# Patient Record
Sex: FEMALE | Race: WHITE | NOT HISPANIC OR LATINO
[De-identification: names, ages, dates, MRNs, and addresses within clinical notes are randomized per-mention and may not be internally consistent; named-entity substitution may affect disease eponyms.]

---

## 2018-12-05 ENCOUNTER — RESULT REVIEW (OUTPATIENT)
Age: 78
End: 2018-12-05

## 2018-12-18 ENCOUNTER — RESULT REVIEW (OUTPATIENT)
Age: 78
End: 2018-12-18

## 2019-01-18 ENCOUNTER — RESULT REVIEW (OUTPATIENT)
Age: 79
End: 2019-01-18

## 2019-01-28 ENCOUNTER — RECORD ABSTRACTING (OUTPATIENT)
Age: 79
End: 2019-01-28

## 2019-01-28 DIAGNOSIS — Z86.39 PERSONAL HISTORY OF OTHER ENDOCRINE, NUTRITIONAL AND METABOLIC DISEASE: ICD-10-CM

## 2019-01-28 DIAGNOSIS — Z78.9 OTHER SPECIFIED HEALTH STATUS: ICD-10-CM

## 2019-01-28 DIAGNOSIS — N95.2 POSTMENOPAUSAL ATROPHIC VAGINITIS: ICD-10-CM

## 2019-01-28 DIAGNOSIS — Z87.891 PERSONAL HISTORY OF NICOTINE DEPENDENCE: ICD-10-CM

## 2019-01-28 LAB — CYTOLOGY CVX/VAG DOC THIN PREP: NORMAL

## 2019-01-31 ENCOUNTER — APPOINTMENT (OUTPATIENT)
Dept: ENDOCRINOLOGY | Facility: CLINIC | Age: 79
End: 2019-01-31
Payer: MEDICARE

## 2019-01-31 ENCOUNTER — RESULT REVIEW (OUTPATIENT)
Age: 79
End: 2019-01-31

## 2019-01-31 VITALS
DIASTOLIC BLOOD PRESSURE: 72 MMHG | BODY MASS INDEX: 28.32 KG/M2 | WEIGHT: 150 LBS | SYSTOLIC BLOOD PRESSURE: 118 MMHG | HEIGHT: 61 IN

## 2019-01-31 PROCEDURE — 99213 OFFICE O/P EST LOW 20 MIN: CPT

## 2019-01-31 RX ORDER — RIVAROXABAN 20 MG/1
20 TABLET, FILM COATED ORAL
Refills: 0 | Status: DISCONTINUED | COMMUNITY
End: 2019-01-31

## 2019-04-13 NOTE — HISTORY OF PRESENT ILLNESS
[FreeTextEntry1] : January 31, 2019\par \par PCP: Dr. Supriya Segovia/Dr. Wojciech Lee\par             Gyn: Dr. Caity Reynolds\par             Urol: Dr. Uriel Alex (stones - one episode) \par             Oral surgeon: Dr. Nugent - dental implants\par            .\par            CC: Osteoporosis (prev. Forteo): Fosamax Jan 2014\par             Last BD: 12/12/2016\par             Asymptomatic kidney stones **\par             Hypercalciuria **\par             (Lyme )\par             History of partial thyroid lobectomy;\par             Thyroid nodules (sono 12/12/2016)\par             -FNAB 6/7/17 [facility] of 2.3 cm L nodule: neg \par             History of I-131 for hyperthyroidism\par             new: DVT LLE 6/2017.\par            " IMPRESSION: No significant change in the appearance of the thyroid since 12/12/2016, including a \par            previously biopsied left lower pole nodule, and an hypoechoic nodule at the posterior aspect of the \par            left upper pole that could be either exophytic or extrathyroidal (parathyroid). In view of the \par            history of hyperparathyroidism, follow-up radionuclide parathyroid scintigraphy can be performed \par            for further more specific evaluation in this regard. \par            ***Electronically Signed *** \par            ----------------------------------------------- \par            Donovan Hollis MD 05/15/18 \par \par \par Recent bone density shows spine T -3.1 and trending down.\par Ultrasound thyroid, no significant change since 12/12/2016 including previously biopsied nodule.  \par Sestamibi parathyroid scan : negative  \par Recent [facility] ionized calcium 5.7 (4.9 - 5.6) **\par PTH 73 (14-64) \par CTXs  653\par osteocalcin 24\par phos 3.3\par TSH 2.88\par vit D 28\par \par No compression fx in 2014.\par \par Imp:  Normocalcemic hyperparathyroid.\par Would benefit from antiresorptive.\par Consider Evenity when available.\par ROV 6 months\par \par \par Previous notes from [employer] appended below.\par \par  June 22, 2018\par            .\par            PCP: Dr. Supriya Segovia/Dr. Wojciech Lee\par             Gyn: Dr. Caity Reynolds\par             Urol: Dr. Uriel Alex (stones - one episode) \par             Oral surgeon: Dr. Nugent - dental implants\par            .\par            CC: Osteoporosis (prev. Forteo): Fosamax Jan 2014\par             Last BD: 12/12/2016\par             Asymptomatic kidney stones **\par             Hypercalciuria **\par             (Lyme )\par             History of partial thyroid lobectomy;\par             Thyroid nodules (sono 12/12/2016)\par             -FNAB 6/7/17 [facility] of 2.3 cm L nodule: neg \par             History of I-131 for hyperthyroidism\par             new: DVT LLE 6/2017.\par            " IMPRESSION: No significant change in the appearance of the thyroid since 12/12/2016, including a \par            previously biopsied left lower pole nodule, and an hypoechoic nodule at the posterior aspect of the \par            left upper pole that could be either exophytic or extrathyroidal (parathyroid). In view of the \par            history of hyperparathyroidism, follow-up radionuclide parathyroid scintigraphy can be performed \par            for further more specific evaluation in this regard. \par            ***Electronically Signed *** \par            ----------------------------------------------- \par            Donovan Hollis MD 05/15/18 \par            .\par            .\par            Impression: Based on above, I will ask her to stop by [facility] for sestamibi parathroid scan and to return after that.\par            can then decide on when and if to start another round of treatment for the osteoporosis. \par            .\par            ==\par            .\par            Feb 2, 2018\par            .\par            PCP: Dr. Supriya Segovia/Dr. Wojciech Lee\par             Gyn: Dr. Caity Reynolds\par             Urol: Dr. Uriel Alex (stones - one episode) \par             Oral surgeon: Dr. Raffi - dental implants\par            .\par            CC: Osteoporosis (prev. Forteo): Fosamax Jan 2014\par             Last BD: 12/12/2016\par             Asymptomatic kidney stones **\par             Hypercalciuria **\par             (Lyme )\par             History of partial thyroid lobectomy;\par             Thyroid nodules (sono 12/12/2016)\par             -FNAB 6/7/17 [facility] of 2.3 cm L nodule: neg \par             History of I-131 for hyperthyroidism\par             new: DVT LLE 6/2017.\par            .\par            Stopped a/c two weeks ago.\par            Stopped Fosamax end of Dec 2017 after 4 years. \par            . \par            Labs ([facility]) from \par            1/23/2018\par            TSH 2.66\par            calcium 10.3\par            ionized calcium 5.9 (4.8 - 5.6) ***\par            phos 3.4\par            PTH 64 (14-64) ***\par            25 OH vit D\par            CTXs 383 ****\par            .\par            Impression: Took two years of Forteo, 4 years alendronate, started "holiday" recently, has dental imiplants, eligible for BD Dec 2018 and will likely benefit from:\par            -sestamibi parathyroid scan\par            -Prolia after next BD\par             - need fu u/s thyroid around April and RDV May. \par            .\par            ==\par            .\par            Sept 15, 2017\par            .\par            PCP: Dr. Supriya Segovia\par             Gyn: Dr. Caity Reynolds\par             Urol: Dr. Uriel Alex (stones - one episode) \par            .\par            CC: Osteoporosis (prev. Forteo): Fosamax Jan 2014\par             Last BD: 12/12/2016\par             Asymptomatic kidney stones **\par             Hypercalciuria **\par             (Lyme )\par             History of partial thyroid lobectomy;\par             Thyroid nodules (sono 12/12/2016)\par             History of I-131 for hyperthyroidism\par             new: DVT LLE 6/2017.\par            .\par            Went for FNAB thyroid nodule at [facility] - cytology read as benign\par            .\par            Bone density stable and December 2017 she will have been on Fosamax for 4 years, so will advise "holiday".\par            .\par            Now on Xaralto because of L DVT:\par            .\par            .\par            "6/23/2017\par            IMPRESSION: Positive DVT study with thrombus extending from the posterior tibial vein into the popliteal vein."\par            .\par            My impression: \par            Labs at [facility] on \par            9/11/2017 included \par            calcium 10.2 (8.6-10.4)\par            phos 3.2\par            PTH 73 (14-64)\par            TSH 2.55 \par            25 OH vit D 28\par            CTXs 282 (on alendronate) \par            .\par            Impression: \par            FNAB thyroid at [facility] (after first at [place] [facility]") reassuring.\par            .\par             Normocalcemic hyperparathyroidism.\par            Bone density stable.\par            Can go on "holiday" from alendronate.\par            Monitor calcium/PTH\par            ROV February. \par            Next BD ~Dec 2019\par            .\par            ==\par            .\par            May 5, 2017\par            .\par            PCP: Dr. Supriya Segovia\par             Gyn: Dr. Caity Reynolds\par             Urol: Dr. Uriel Alex (stones - one episode) \par            .\par            CC: Osteoporosis (prev. Forteo): Fosamax Jan 2014\par             Last BD: 12/12/2016\par             Asymptomatic kidney stones **\par             Hypercalciuria **\par             (Lyme )\par             History of partial thyroid lobectomy;\par             Thyroid nodules (sono 12/12/2016)\par             History of I-131 for hyperthyroidism\par             .\par            .\par            Imp/Plan:\par            .\par            Biopsy of dominant thyroid nodule at [place] was not successful.\par            Went to [facility] for sonogram and Dr. Phipps also advised FNAB so will ask her to try there. \par            .\par            Bone density seems to have improved on Fosamax. End of this year will be four years and a reasonable time to consider a "holiday" if she and the rest of her team agree.\par            .\par            Monitor vit D and PTH level.\par            ROV in September. \par            .\par            .\par            .\par            ==\par            .\par            March 3, 2017\par            .\par            PCP: Dr. Supriya Segovia\par             Gyn: Dr. Caity Reynolds\par            .\par            CC: Osteoporosis (prev. Forteo): Fosamax Jan 2014\par             Last BD: 12/12/2016\par             Asymptomatic kidney stones \par             Hypercalciuria \par             (Lyme )\par             History of partial thyroid lobectomy;\par             Thyroid nodules (sono 12/12/2016)\par             History of I-131 for hyperthyroidism\par            .\par            Visited [facility] and enjoyed it. \par            .\par            Labs ([facility])\par            24 hour urine collection \par            1 g creatinine \par            calcium 287 (<250)\par            .\par            creatinine 0.81\par            uric acid 4.2\par            serum calcium 10.4 (8.6-10.4) \par            PTH 60 \par            25 OH vit D 27 \par            CTXs 278\par            .\par            Bone density stable\par            Teeth stable\par            Thyroid sono advises FNAB of nodule (she is s/p partial lobectomy in [facility] and I-131 for hyperthyroidism several years ago)\par            .\par            Impression: Because of evidence of possible normocalcemic hyperparathyroidism, she went for ultrasound of thyroid/parathyroid and nodules detected with recommendation for FNAB \par            .\par            Osteoporosis currently stable. \par            .\par            Plan: ROV in two months (after FNAB of thyroid nodule)\par            .\par            ==\par            .;\par            October 7, 2016\par            .\par            PCP: Dr. Supriya Segovia\par             Gyn: Dr. Caity Reynolds\par            .\par            CC: Osteoporosis (prev. Forteo): Fosamax Jan 2014\par             (Lyme last \par            .\par            Dental issues are quiet\par            Remains on alendronate weekly.\par            .\par            Blood tests ([facility]) from \par            3/18/2016 inclued\par            BS 87 mg/dl\par            calcium 9.1\par            phos 3.1\par            TSH 2.28 \par            Vit B12 367\par            PTH 78 (14-65) \par            creat 0.79\par            vit D 35\par            ionized calcium - normal \par            .\par            Did not yet go for BD.\par            For problem with urination saw Uriel at [facility]. \par            sono showed bilateral tiny kidney stones. \par            stopped calcium supp after that. \par            (mother had stones)\par            .\par            Plan: sono thyroid\par            repeat PTH\par            24 hour urine ca/stone former\par            bone density\par            ROV Feb\par            continue Fosamax\par            .\par            ==\par            .\par            March 18, 2016\par            .\par            PCP: Dr. Supriya Segovia\par             Gyn: Dr. Caity Reynolds\par            .\par            CC: Osteoporosis (prev. Forteo): Fosamax Jan 2014\par             (Lyme last \par            .\par            Had bone grafting lower part of her mouth - last September.\par            .\par            Plan: Updated labs now.\par            ROV late October after BD\par            .\par            ==\par            .\par            June 5, 2015\par            .\par            PCP: Dr. Supriya Segovia\par             Gyn: Dr. Caity Reynolds\par            .\par            CC: Osteoporosis (prev. Forteo): Fosamax Jan 2014\par             (Lyme last \par            .\par            [facility]\par            .\par            Recent [facility] labs show 25 OH vit 41\par            CTXs 148\par            ionized calcium 5.7 (4.8-5.6) \par            .\par            Impression: Took Forteo for two years and has been on Fosamax since Jan 2014.\par            .\par            Impression: Dental issues are quiet. Serial bone densities show considerable improvement over time, especially in her spine. Last BD Sept 2014 showed spine T -2.6 Z -0.3.\par            .\par            Plan: Continue Fosamax for 3-5 years.\par            .Next BD ~ Oct 2016.\par            ROV here in March.\par            .\par            .\par            ===\par            October 28, 2014\par            PCP: Dr. Supriya Segovia\par            CC: Osteoporosis (prev. Forteo)\par            .\par            Treated for Lyme over the summer.\par            On Fosamax since Jan 2014.\par            April 2014 CTXs 192 (10/2013 CTXs 341).\par             vit D 36\par            .\par            Much of improvement in bone density was likely from the Forteo and she is now holding her own with Fosamax. \par            .\par            Dental implant fine.\par            .\par            Plan: Continue the Fosamax for up to 3 years and then consider Prolia.\par            ROV six months. \par            .\par            ===\par            April 8, 2014\par            PCP: Dr. Supriya Segovia\par            CC: Osteoporosois\par            .\par            Took Forteo for two years.\par            Because of dental issues preferred to take Calcitonin after that.\par            Switched to Fosamax around Januiary 2014.\par            Notes no symptoms from Fosamax.\par            Her densist, Dr. Nugent, is pleased with her progress.\par            She goes for teeth cleaning every 3 months.\par            .\par            Impression: Markers of bone turnover have decreased on Fosamax, predicting absorption and effectiveness.\par            .\par            Plan: Same Rx. Follow up BD two years after last. \par            .\par            .\par            ====\par            October 28, 2013\par            PCP: Dr. Supriya Segovia\par            CC: Osteoporosis (occasion hypercalcemia); previous Forteo\par            .\par            Blood tests at [facility] in July showed calcium 10.3, PTH 47\par            CTXs 342. \par            BSA 18.1 (14.2 - 42.7)\par            .\par            Labs from today still pending.\par            Dr. Nugent said: "Dr. Hellerman can do whatever he wants now."\par            Has a 3 month supply of calcitonin. \par            Plan: After finish\par            .\par            ========\par            March 26, 2013 Returns for osteoporosis. Has not yet received approval from Dr. Nugent to have more aggressive treatment than calcitonin (such as Fosamax, Reclast, or Prolia). She will be seeing Dr. Nugent within 3 months and will get his approval. In the meatime, she will stay on the calcitonin nasal spray. \par            .\par            Had blood tests last week at [facility] in [place]. \par  \par  ROS:  \par  \par

## 2019-04-13 NOTE — PHYSICAL EXAM
[Alert] : alert [No Acute Distress] : no acute distress [Well Nourished] : well nourished [Well Developed] : well developed [Normal Sclera/Conjunctiva] : normal sclera/conjunctiva [EOMI] : extra ocular movement intact [No Proptosis] : no proptosis [Normal Oropharynx] : the oropharynx was normal [Thyroid Not Enlarged] : the thyroid was not enlarged [No Thyroid Nodules] : there were no palpable thyroid nodules [No Respiratory Distress] : no respiratory distress [No Accessory Muscle Use] : no accessory muscle use [Clear to Auscultation] : lungs were clear to auscultation bilaterally [Normal Rate] : heart rate was normal  [Normal S1, S2] : normal S1 and S2 [Regular Rhythm] : with a regular rhythm [Pedal Pulses Normal] : the pedal pulses are present [No Edema] : there was no peripheral edema [Normal Bowel Sounds] : normal bowel sounds [Not Tender] : non-tender [Soft] : abdomen soft [Not Distended] : not distended [Post Cervical Nodes] : posterior cervical nodes [Anterior Cervical Nodes] : anterior cervical nodes [Axillary Nodes] : axillary nodes [Normal] : normal and non tender [No Spinal Tenderness] : no spinal tenderness [Spine Straight] : spine straight [No Stigmata of Cushings Syndrome] : no stigmata of cushings syndrome [Normal Gait] : normal gait [Normal Strength/Tone] : muscle strength and tone were normal [No Rash] : no rash [No Tremors] : no tremors [Normal Reflexes] : deep tendon reflexes were 2+ and symmetric [Oriented x3] : oriented to person, place, and time [Acanthosis Nigricans] : no acanthosis nigricans

## 2019-06-27 ENCOUNTER — APPOINTMENT (OUTPATIENT)
Dept: ENDOCRINOLOGY | Facility: CLINIC | Age: 79
End: 2019-06-27
Payer: MEDICARE

## 2019-06-27 VITALS
SYSTOLIC BLOOD PRESSURE: 110 MMHG | HEART RATE: 75 BPM | BODY MASS INDEX: 29.07 KG/M2 | WEIGHT: 154 LBS | DIASTOLIC BLOOD PRESSURE: 68 MMHG | HEIGHT: 61 IN

## 2019-06-27 PROCEDURE — 99213 OFFICE O/P EST LOW 20 MIN: CPT

## 2019-06-27 RX ORDER — CHROMIUM 200 MCG
1000 TABLET ORAL DAILY
Refills: 0 | Status: DISCONTINUED | COMMUNITY
End: 2019-06-27

## 2019-06-27 NOTE — ASSESSMENT
[FreeTextEntry1] : Has dental issues, seem to be quieting down.\par On "holiday" from alendronate.\par Will likely aim for Prolia for next tretament.

## 2019-11-12 ENCOUNTER — APPOINTMENT (OUTPATIENT)
Dept: ENDOCRINOLOGY | Facility: CLINIC | Age: 79
End: 2019-11-12
Payer: MEDICARE

## 2019-11-12 VITALS
HEIGHT: 61 IN | DIASTOLIC BLOOD PRESSURE: 70 MMHG | BODY MASS INDEX: 28.32 KG/M2 | HEART RATE: 56 BPM | SYSTOLIC BLOOD PRESSURE: 110 MMHG | WEIGHT: 150 LBS

## 2019-11-12 DIAGNOSIS — Z87.39 PERSONAL HISTORY OF OTHER DISEASES OF THE MUSCULOSKELETAL SYSTEM AND CONNECTIVE TISSUE: ICD-10-CM

## 2019-11-12 DIAGNOSIS — E55.9 VITAMIN D DEFICIENCY, UNSPECIFIED: ICD-10-CM

## 2019-11-12 PROCEDURE — 99214 OFFICE O/P EST MOD 30 MIN: CPT

## 2019-11-15 PROBLEM — E55.9 VITAMIN D DEFICIENCY: Status: ACTIVE | Noted: 2019-01-28

## 2019-11-15 PROBLEM — Z87.39 HISTORY OF OSTEOPOROSIS: Status: RESOLVED | Noted: 2019-01-28 | Resolved: 2019-11-15

## 2019-11-15 NOTE — PHYSICAL EXAM
[Alert] : alert [Well Nourished] : well nourished [No Acute Distress] : no acute distress [Well Developed] : well developed [Healthy Appearance] : healthy appearance [Normal Voice/Communication] : normal voice communication [EOMI] : extra ocular movement intact [No Proptosis] : no proptosis [Normal Oropharynx] : the oropharynx was normal [No Lid Lag] : no lid lag [No Respiratory Distress] : no respiratory distress [Thyroid Not Enlarged] : the thyroid was not enlarged [No Thyroid Nodules] : there were no palpable thyroid nodules [Normal Rate and Effort] : normal respiratory rhythm and effort [No Accessory Muscle Use] : no accessory muscle use [Normal Rate] : heart rate was normal  [Pedal Pulses Normal] : the pedal pulses are present [No Edema] : there was no peripheral edema [No Stigmata of Cushings Syndrome] : no stigmata of cushings syndrome [Normal Gait] : normal gait [Acanthosis Nigricans] : no acanthosis nigricans [No Rash] : no rash [Oriented x3] : oriented to person, place, and time [No Tremors] : no tremors [Normal Insight/Judgement] : insight and judgment were intact [Normal Affect] : the affect was normal [Normal Mood] : the mood was normal

## 2019-11-15 NOTE — HISTORY OF PRESENT ILLNESS
[FreeTextEntry1] : Nov 12, 2019\par \par PCP:     Dr. ZEB Saleh (to see) ******\par             Gyn: Dr. Caity Reynolds\par             Urol: Dr. Uriel Alex (stones - one episode) \par             Oral surgeon: Dr. Nugent - dental implants\par            .\par            CC: Osteoporosis (prev. Forteo): Fosamax Jan 2014 2/23/2018  CTX s 383  ionized Ca  5.9 (<5.6) [facility]\par                               1/31/2019:  no compression fractures.  Low vit D  \par                                6/27/19 [facility] Spine T -3.0, TH -1.4, FN -2.2, forearm -2.5\par                               11/8/19 hypercalcemia  Ca 10.5 (8.6 - 10.4) [facility]  CTXs 429  Vit D 30 (no PTH)\par                   Thyroid nodule  2.3 cm L, FNAB 6/2017 [facility] - benign- low cellularity\par                   Kidney stones  10/2015 US Kidney - asymptomatic \par \par \par #  Will ask her to have f/u US thyroid within next year.\par \par #  Osteoporos of spine \par         Has been on "holiday" from alendronate.  \par \par         Impression:  Previously diagnosed with normocalcemic hyperparathryoidism; however, most recent\par           [facility] calcium slightly elevated at 10.5, normal up to 10.4, with normal renal function and\par          25 OH vitmin D level of 30  (but no PTH)\par \par           X-ray spine neg:\par               Lumbosacral Spine.\par \par Frontal, lateral and cone-down projections demonstrate satisfactory alignment of the bony structures. No fracture or vertebral compression can be appreciated. There is no evidence of pedicle destruction. Intervertebral disc spaces are satisfactorily maintained. The sacroiliac joints are symmetric. As compared to the patient's previous study of 9/29/2014, there has been no significant change of an adverse nature.\par \par \par IMPRESSION: No evidence of lumbar compression deformity. No significant change from 9/29/2014.\par \par \par ***Electronically Signed ***\par -----------------------------------------------\par Wojciech Biggs MD              01/31/19 1454\par \par \par IMPRESSION:\par \par No focal site of initial increased and persistent sestamibi tracer localization is evident to specifically suggest uptake in parathyroid adenoma or hyperplasia.\par \par The left thyroid lobe is asymmetrically much larger than the right with much more intense tracer localization.\par \par \par \par \par ***Electronically Signed ***\par -----------------------------------------------\par Donovan Hollis MD              12/06/18 \par \par Imp:  Sestamibi negative.      \par Plan:  24 hour Ca at [facility] with PTH, vit D before next visit in 6 months - will likely decide on\par Prolia at that time; however, will also reconsider evaluation of parathyroid.  \par \par \par \par \par June 27, 2019\par \par PCP:     Dr. ZEB Saleh (to see) ******\par             Gyn: Dr. Caity Reynolds\par             Urol: Dr. Uriel Alex (stones - one episode) \par             Oral surgeon: Dr. Nugent - dental implants\par            .\par            CC: Osteoporosis (prev. Forteo): Fosamax Jan 2014\par \par 77 yo on "holiday" from Fosamax.  \par Lab tests from yesterday at [facility] include \par TSH 1.81\par 25 OH vit D 24\par \par Lumbar spine, L1 to L2, total: 0.647 gm./cm.2.\par \par T-score: -3.0\par \par Z-score: -0.6\par \par Left hip: 0.767 gm./cm.2.\par \par T-score: -1.4 \par \par Z-score: 0.6\par \par Left femoral neck: 0.606\par \par T score: -2.2\par \par Z score: 0.1\par \par  Left forearm, distal third: 0.544\par \par T score: -2.5\par \par Z score: 0.5\par \par On holiday from alendronate.\par \par Last dental implant a few weeks ago.   After a bone graft !\par No other implants on the horizon.\par Plan:  Updated labs late Nov, ROV Dec.\par Likely Prolia next time around  \par \par January 31, 2019\par \par PCP: Dr. Supriya Segovia/Dr. Wojciech Lee\par             Gyn: Dr. Caity Reynolds\par             Urol: Dr. Uriel Alex (stones - one episode) \par             Oral surgeon: Dr. Nugent - dental implants\par            .\par            CC: Osteoporosis (prev. Forteo): Fosamax Jan 2014\par             Last BD: 12/12/2016\par             Asymptomatic kidney stones **\par             Hypercalciuria **\par             (Lyme )\par             History of partial thyroid lobectomy;\par             Thyroid nodules (sono 12/12/2016)\par             -FNAB 6/7/17 [facility] of 2.3 cm L nodule: neg \par             History of I-131 for hyperthyroidism\par             new: DVT LLE 6/2017.\par            " IMPRESSION: No significant change in the appearance of the thyroid since 12/12/2016, including a \par            previously biopsied left lower pole nodule, and an hypoechoic nodule at the posterior aspect of the \par            left upper pole that could be either exophytic or extrathyroidal (parathyroid). In view of the \par            history of hyperparathyroidism, follow-up radionuclide parathyroid scintigraphy can be performed \par            for further more specific evaluation in this regard. \par            ***Electronically Signed *** \par            ----------------------------------------------- \par            Donovan Hollis MD 05/15/18 \par \par \par Recent bone density shows spine T -3.1 and trending down.\par Ultrasound thyroid, no significant change since 12/12/2016 including previously biopsied nodule.  \par Sestamibi parathyroid scan : negative  \par Recent [facility] ionized calcium 5.7 (4.9 - 5.6) **\par PTH 73 (14-64) \par CTXs  653\par osteocalcin 24\par phos 3.3\par TSH 2.88\par vit D 28\par \par No compression fx in 2014.\par \par Imp:  Normocalcemic hyperparathyroid.\par Would benefit from antiresorptive.\par Consider Evenity when available.\par ROV 6 months\par \par \par Previous notes from [employer] appended below.\par \par  June 22, 2018\par            .\par            PCP: Dr. Supriya Segovia/Dr. Wojciech Lee\par             Gyn: Dr. Caity Reynolds\par             Urol: Dr. Uriel Alex (stones - one episode) \par             Oral surgeon: Dr. Nugent - dental implants\par            .\par            CC: Osteoporosis (prev. Forteo): Fosamax Jan 2014\par             Last BD: 12/12/2016\par             Asymptomatic kidney stones **\par             Hypercalciuria **\par             (Lyme )\par             History of partial thyroid lobectomy;\par             Thyroid nodules (sono 12/12/2016)\par             -FNAB 6/7/17 [facility] of 2.3 cm L nodule: neg \par             History of I-131 for hyperthyroidism\par             new: DVT LLE 6/2017.\par            " IMPRESSION: No significant change in the appearance of the thyroid since 12/12/2016, including a \par            previously biopsied left lower pole nodule, and an hypoechoic nodule at the posterior aspect of the \par            left upper pole that could be either exophytic or extrathyroidal (parathyroid). In view of the \par            history of hyperparathyroidism, follow-up radionuclide parathyroid scintigraphy can be performed \par            for further more specific evaluation in this regard. \par            ***Electronically Signed *** \par            ----------------------------------------------- \par            Donovan Hollis MD 05/15/18 \par            .\par            .\par            Impression: Based on above, I will ask her to stop by [facility] for sestamibi parathroid scan and to return after that.\par            can then decide on when and if to start another round of treatment for the osteoporosis. \par            .\par            ==\par            .\par            Feb 2, 2018\par            .\par            PCP: Dr. Supriya Segovia/Dr. Wojciech Lee\par             Gyn: Dr. Caity Reynolds\par             Urol: Dr. Uriel Alex (stones - one episode) \par             Oral surgeon: Dr. Nugent - dental implants\par            .\par            CC: Osteoporosis (prev. Forteo): Fosamax Jan 2014\par             Last BD: 12/12/2016\par             Asymptomatic kidney stones **\par             Hypercalciuria **\par             (Lyme )\par             History of partial thyroid lobectomy;\par             Thyroid nodules (sono 12/12/2016)\par             -FNAB 6/7/17 [facility] of 2.3 cm L nodule: neg \par             History of I-131 for hyperthyroidism\par             new: DVT LLE 6/2017.\par            .\par            Stopped a/c two weeks ago.\par            Stopped Fosamax end of Dec 2017 after 4 years. \par            . \par            Labs ([facility]) from \par            1/23/2018\par            TSH 2.66\par            calcium 10.3\par            ionized calcium 5.9 (4.8 - 5.6) ***\par            phos 3.4\par            PTH 64 (14-64) ***\par            25 OH vit D\par            CTXs 383 ****\par            .\par            Impression: Took two years of Forteo, 4 years alendronate, started "holiday" recently, has dental imiplants, eligible for BD Dec 2018 and will likely benefit from:\par            -sestamibi parathyroid scan\par            -Prolia after next BD\par             - need fu u/s thyroid around April and RDV May. \par            .\par            ==\par            .\par            Sept 15, 2017\par            .\par            PCP: Dr. Supriya Segovia\par             Gyn: Dr. Caity Reynolds\par             Urol: Dr. Uriel Alex (stones - one episode) \par            .\par            CC: Osteoporosis (prev. Forteo): Fosamax Jan 2014\par             Last BD: 12/12/2016\par             Asymptomatic kidney stones **\par             Hypercalciuria **\par             (Lyme )\par             History of partial thyroid lobectomy;\par             Thyroid nodules (sono 12/12/2016)\par             History of I-131 for hyperthyroidism\par             new: DVT LLE 6/2017.\par            .\par            Went for FNAB thyroid nodule at [facility] - cytology read as benign\par            .\par            Bone density stable and December 2017 she will have been on Fosamax for 4 years, so will advise "holiday".\par            .\par            Now on Xaralto because of L DVT:\par            .\par            .\par            "6/23/2017\par            IMPRESSION: Positive DVT study with thrombus extending from the posterior tibial vein into the popliteal vein."\par            .\par            My impression: \par            Labs at [facility] on \par            9/11/2017 included \par            calcium 10.2 (8.6-10.4)\par            phos 3.2\par            PTH 73 (14-64)\par            TSH 2.55 \par            25 OH vit D 28\par            CTXs 282 (on alendronate) \par            .\par            Impression: \par            FNAB thyroid at [facility] (after first at [place] "[facility]") reassuring.\par            .\par             Normocalcemic hyperparathyroidism.\par            Bone density stable.\par            Can go on "holiday" from alendronate.\par            Monitor calcium/PTH\par            ROV February. \par            Next BD ~Dec 2019\par            .\par            ==\par            .\par            May 5, 2017\par            .\par            PCP: Dr. Supriya Segovia\par             Gyn: Dr. Caity Reynolds\par             Urol: Dr. Uriel Alex (stones - one episode) \par            .\par            CC: Osteoporosis (prev. Forteo): Fosamax Jan 2014\par             Last BD: 12/12/2016\par             Asymptomatic kidney stones **\par             Hypercalciuria **\par             (Lyme )\par             History of partial thyroid lobectomy;\par             Thyroid nodules (sono 12/12/2016)\par             History of I-131 for hyperthyroidism\par             .\par            .\par            Imp/Plan:\par            .\par            Biopsy of dominant thyroid nodule at [facility] was not successful.\par            Went to [facility] for sonogram and Dr. Phipps also advised FNAB so will ask her to try there. \par            .\par            Bone density seems to have improved on Fosamax. End of this year will be four years and a reasonable time to consider a "holiday" if she and the rest of her team agree.\par            .\par            Monitor vit D and PTH level.\par            ROV in September. \par            .\par            .\par            .\par            ==\par            .\par            March 3, 2017\par            .\par            PCP: Dr. Supriya Segovia\par             Gyn: Dr. Caity Reynolds\par            .\par            CC: Osteoporosis (prev. Forteo): Fosamax Jan 2014\par             Last BD: 12/12/2016\par             Asymptomatic kidney stones \par             Hypercalciuria \par             (Lyme )\par             History of partial thyroid lobectomy;\par             Thyroid nodules (sono 12/12/2016)\par             History of I-131 for hyperthyroidism\par            .\par            Visited [facility] and enjoyed it. \par            .\par            Labs ([facility])\par            24 hour urine collection \par            1 g creatinine \par            calcium 287 (<250)\par            .\par            creatinine 0.81\par            uric acid 4.2\par            serum calcium 10.4 (8.6-10.4) \par            PTH 60 \par            25 OH vit D 27 \par            CTXs 278\par            .\par            Bone density stable\par            Teeth stable\par            Thyroid sono advises FNAB of nodule (she is s/p partial lobectomy in college and I-131 for hyperthyroidism several years ago)\par            .\par            Impression: Because of evidence of possible normocalcemic hyperparathyroidism, she went for ultrasound of thyroid/parathyroid and nodules detected with recommendation for FNAB \par            .\par            Osteoporosis currently stable. \par            .\par            Plan: ROV in two months (after FNAB of thyroid nodule)\par            .\par            ==\par            .;\par            October 7, 2016\par            .\par            PCP: Dr. Supriya Segovia\par             Gyn: Dr. Caity Reynolds\par            .\par            CC: Osteoporosis (prev. Forteo): Fosamax Jan 2014\par             (Lyme last \par            .\par            Dental issues are quiet\par            Remains on alendronate weekly.\par            .\par            Blood tests ([facility]) from \par            3/18/2016 inclued\par            BS 87 mg/dl\par            calcium 9.1\par            phos 3.1\par            TSH 2.28 \par            Vit B12 367\par            PTH 78 (14-65) \par            creat 0.79\par            vit D 35\par            ionized calcium - normal \par            .\par            Did not yet go for BD.\par            For problem with urination saw Uriel at [facility]. \par            sono showed bilateral tiny kidney stones. \par            stopped calcium supp after that. \par            (mother had stones)\par            .\par            Plan: sono thyroid\par            repeat PTH\par            24 hour urine ca/stone former\par            bone density\par            ROV Feb\par            continue Fosamax\par            .\par            ==\par            .\par            March 18, 2016\par            .\par            PCP: Dr. Supriya Segovia\par             Gyn: Dr. Caity Reynolds\par            .\par            CC: Osteoporosis (prev. Forteo): Fosamax Jan 2014\par             (Lyme last \par            .\par            Had bone grafting lower part of her mouth - last September.\par            .\par            Plan: Updated labs now.\par            ROV late October after BD\par            .\par            ==\par            .\par            June 5, 2015\par            .\par            PCP: Dr. Supriya Segovia\par             Gyn: Dr. Caity Reynolds\par            .\par            CC: Osteoporosis (prev. Forteo): Fosamax Jan 2014\par             (Lyme last \par            .\par            [facility]\par            .\par            Recent [facility] labs show 25 OH vit 41\par            CTXs 148\par            ionized calcium 5.7 (4.8-5.6) \par            .\par            Impression: Took Forteo for two years and has been on Fosamax since Jan 2014.\par            .\par            Impression: Dental issues are quiet. Serial bone densities show considerable improvement over time, especially in her spine. Last BD Sept 2014 showed spine T -2.6 Z -0.3.\par            .\par            Plan: Continue Fosamax for 3-5 years.\par            .Next BD ~ Oct 2016.\par            ROV here in March.\par            .\par            .\par            ===\par            October 28, 2014\par            PCP: Dr. Supriya Segovia\par            CC: Osteoporosis (prev. Forteo)\par            .\par            Treated for Lyme over the summer.\par            On Fosamax since Jan 2014.\par            April 2014 CTXs 192 (10/2013 CTXs 341).\par             vit D 36\par            .\par            Much of improvement in bone density was likely from the Forteo and she is now holding her own with Fosamax. \par            .\par            Dental implant fine.\par            .\par            Plan: Continue the Fosamax for up to 3 years and then consider Prolia.\par            ROV six months. \par            .\par            ===\par            April 8, 2014\par            PCP: Dr. Supriya Segovia\par            CC: Osteoporosois\par            .\par            Took Forteo for two years.\par            Because of dental issues preferred to take Calcitonin after that.\par            Switched to Fosamax around Januiary 2014.\par            Notes no symptoms from Fosamax.\par            Her densist, Dr. Nugent, is pleased with her progress.\par            She goes for teeth cleaning every 3 months.\par            .\par            Impression: Markers of bone turnover have decreased on Fosamax, predicting absorption and effectiveness.\par            .\par            Plan: Same Rx. Follow up BD two years after last. \par            .\par            .\par            ====\par            October 28, 2013\par            PCP: Dr. Supriya Segovia\par            CC: Osteoporosis (occasion hypercalcemia); previous Forteo\par            .\par            Blood tests at [facility] in July showed calcium 10.3, PTH 47\par            CTXs 342. \par            BSA 18.1 (14.2 - 42.7)\par            .\par            Labs from today still pending.\par            Dr. Nugent said: "Dr. Hellerman can do whatever he wants now."\par            Has a 3 month supply of calcitonin. \par            Plan: After finish\par            .\par            ========\par            March 26, 2013 Returns for osteoporosis. Has not yet received approval from Dr. Nugent to have more aggressive treatment than calcitonin (such as Fosamax, Reclast, or Prolia). She will be seeing Dr. Nugent within 3 months and will get his approval. In the meatime, she will stay on the calcitonin nasal spray. \par            .\par            Had blood tests last week at [facility] in [place]. \par  \par  ROS:  \par  \par

## 2020-01-21 ENCOUNTER — RESULT REVIEW (OUTPATIENT)
Age: 80
End: 2020-01-21

## 2020-01-24 ENCOUNTER — RESULT REVIEW (OUTPATIENT)
Age: 80
End: 2020-01-24

## 2020-05-05 ENCOUNTER — APPOINTMENT (OUTPATIENT)
Dept: ENDOCRINOLOGY | Facility: CLINIC | Age: 80
End: 2020-05-05
Payer: MEDICARE

## 2020-05-05 ENCOUNTER — TRANSCRIPTION ENCOUNTER (OUTPATIENT)
Age: 80
End: 2020-05-05

## 2020-05-05 PROCEDURE — 99214 OFFICE O/P EST MOD 30 MIN: CPT

## 2020-05-06 NOTE — HISTORY OF PRESENT ILLNESS
[Verbal consent obtained from patient] : the patient, [unfilled] [FreeTextEntry1] : May 05, 2020  Telephone Visit\par \par PCP:     Dr. ZEB Saleh\par             Gyn: Dr. Caity Reynolds\par             Urol: Dr. Uriel Alex (stones - one episode) \par             Oral surgeon: Dr. Nugent - dental implants\par            .\par            CC: Osteoporosis (prev. Forteo): Fosamax Jan 2014 2/23/2018  CTX s 383  ionized Ca  5.9 (<5.6) [facility]\par                               1/31/2019:  no compression fractures.  Low vit D  \par                                6/27/19 [facility] Spine T -3.0, TH -1.4, FN -2.2, forearm -2.5\par                               11/8/19 hypercalcemia  Ca 10.5 (8.6 - 10.4) [facility]  CTXs 429  Vit D 30 (no PTH)\par                   Thyroid nodule  2.3 cm L, FNAB 6/2017 [facility] - benign- low cellularity\par                   Kidney stones  10/2015 [facility] - asymptomatic \par \par No history of fracture.  Spine T score -3.0  12/18/2018\par \par Recent labs include\par 24 hour urine calcium of 255 mg\par 25 OH vit D 30 on ~1000 iu daily.\par PTH 94 (<94)\par \par Labs at [facility] 11/8/2019 included\par calcium 10.5\par \par \par \par Impression:  Trajectory of spine bone density previously improved, but appears to have plateaued and perhaps may be heading back down.  Not an ideal candidate for PTX.   May consider Prolia in the future.  \par \par Impression:  On "holiday" from alendronate.  \par \par \par Nov 12, 2019\par \par PCP:     Dr. ZEB Saleh\par             Gyn: Dr. Caity Reynolds\par             Urol: Dr. Uriel Alex (stones - one episode) \par             Oral surgeon: Dr. Nugent - dental implants\par            .\par            CC: Osteoporosis (prev. Forteo): Fosamax Jan 2014 2/23/2018  CTX s 383  ionized Ca  5.9 (<5.6) [facility]\par                               1/31/2019:  no compression fractures.  Low vit D  \par                                6/27/19 [facility] Spine T -3.0, TH -1.4, FN -2.2, forearm -2.5\par                               11/8/19 hypercalcemia  Ca 10.5 (8.6 - 10.4) [facility]  CTXs 429  Vit D 30 (no PTH)\par                   Thyroid nodule  2.3 cm L, FNAB 6/2017 [facility] - benign- low cellularity\par                   Kidney stones  10/2015 [facility] - asymptomatic \par \par \par #  Will ask her to have f/u US thyroid within next year.\par \par #  Osteoporos of spine \par         Has been on "holiday" from alendronate.  \par \par         Impression:  Previously diagnosed with normocalcemic hyperparathryoidism; however, most recent\par           [facility] calcium slightly elevated at 10.5, normal up to 10.4, with normal renal function and\par          25 OH vitmin D level of 30  (but no PTH)\par \par           X-ray spine neg:\par               Lumbosacral Spine.\par \par Frontal, lateral and cone-down projections demonstrate satisfactory alignment of the bony structures. No fracture or vertebral compression can be appreciated. There is no evidence of pedicle destruction. Intervertebral disc spaces are satisfactorily maintained. The sacroiliac joints are symmetric. As compared to the patient's previous study of 9/29/2014, there has been no significant change of an adverse nature.\par \par \par IMPRESSION: No evidence of lumbar compression deformity. No significant change from 9/29/2014.\par \par \par ***Electronically Signed ***\par -----------------------------------------------\par Wojciech Biggs MD              01/31/19 2856\par \par \par IMPRESSION:\par \par No focal site of initial increased and persistent sestamibi tracer localization is evident to specifically suggest uptake in parathyroid adenoma or hyperplasia.\par \par The left thyroid lobe is asymmetrically much larger than the right with much more intense tracer localization.\par \par \par \par \par ***Electronically Signed ***\par -----------------------------------------------\par Donovan Hollis MD              12/06/18 \par \par Imp:  Sestamibi negative.      \par Plan:  24 hour Ca at [facility] with PTH, vit D before next visit in 6 months - will likely decide on\par Prolia at that time; however, will also reconsider evaluation of parathyroid.  \par \par \par \par \par June 27, 2019\par \par PCP:     Dr. ZEB Saleh (to see) ******\par             Gyn: Dr. Caity Reynolds\par             Urol: Dr. Uriel Alex (stones - one episode) \par             Oral surgeon: Dr. Nugent - dental implants\par            .\par            CC: Osteoporosis (prev. Forteo): Fosamax Jan 2014\par \par 77 yo on "holiday" from Fosamax.  \par Lab tests from yesterday at [facility] include \par TSH 1.81\par 25 OH vit D 24\par \par Lumbar spine, L1 to L2, total: 0.647 gm./cm.2.\par \par T-score: -3.0\par \par Z-score: -0.6\par \par Left hip: 0.767 gm./cm.2.\par \par T-score: -1.4 \par \par Z-score: 0.6\par \par Left femoral neck: 0.606\par \par T score: -2.2\par \par Z score: 0.1\par \par  Left forearm, distal third: 0.544\par \par T score: -2.5\par \par Z score: 0.5\par \par On holiday from alendronate.\par \par Last dental implant a few weeks ago.   After a bone graft !\par No other implants on the horizon.\par Plan:  Updated labs late Nov, ROV Dec.\par Likely Prolia next time around  \par \par January 31, 2019\par \par PCP: Dr. Supriya Segovia/Dr. Wojciech Lee\par             Gyn: Dr. Caity Reynolds\par             Urol: Dr. Uriel Alex (stones - one episode) \par             Oral surgeon: Dr. Nugent - dental implants\par            .\par            CC: Osteoporosis (prev. Forteo): Fosamax Jan 2014\par             Last BD: 12/12/2016\par             Asymptomatic kidney stones **\par             Hypercalciuria **\par             (Lyme )\par             History of partial thyroid lobectomy;\par             Thyroid nodules (sono 12/12/2016)\par             -FNAB 6/7/17 [facility] of 2.3 cm L nodule: neg \par             History of I-131 for hyperthyroidism\par             new: DVT LLE 6/2017.\par            " IMPRESSION: No significant change in the appearance of the thyroid since 12/12/2016, including a \par            previously biopsied left lower pole nodule, and an hypoechoic nodule at the posterior aspect of the \par            left upper pole that could be either exophytic or extrathyroidal (parathyroid). In view of the \par            history of hyperparathyroidism, follow-up radionuclide parathyroid scintigraphy can be performed \par            for further more specific evaluation in this regard. \par            ***Electronically Signed *** \par            ----------------------------------------------- \par            Donovan Hollis MD 05/15/18 \par \par \par Recent bone density shows spine T -3.1 and trending down.\par Ultrasound thyroid, no significant change since 12/12/2016 including previously biopsied nodule.  \par Sestamibi parathyroid scan : negative  \par Recent [facility] ionized calcium 5.7 (4.9 - 5.6) **\par PTH 73 (14-64) \par CTXs  653\par osteocalcin 24\par phos 3.3\par TSH 2.88\par vit D 28\par \par No compression fx in 2014.\par \par Imp:  Normocalcemic hyperparathyroid.\par Would benefit from antiresorptive.\par Consider Evenity when available.\par ROV 6 months\par \par \par Previous notes from [employer] appended below.\par \par  June 22, 2018\par            .\par            PCP: Dr. Supriya Segovia/Dr. Wojciech Lee\par             Gyn: Dr. Caity Reynolds\par             Urol: Dr. Uriel Alex (stones - one episode) \par             Oral surgeon: Dr. Nugent - dental implants\par            .\par            CC: Osteoporosis (prev. Forteo): Fosamax Jan 2014\par             Last BD: 12/12/2016\par             Asymptomatic kidney stones **\par             Hypercalciuria **\par             (Lyme )\par             History of partial thyroid lobectomy;\par             Thyroid nodules (sono 12/12/2016)\par             -FNAB 6/7/17 [facility] of 2.3 cm L nodule: neg \par             History of I-131 for hyperthyroidism\par             new: DVT LLE 6/2017.\par            " IMPRESSION: No significant change in the appearance of the thyroid since 12/12/2016, including a \par            previously biopsied left lower pole nodule, and an hypoechoic nodule at the posterior aspect of the \par            left upper pole that could be either exophytic or extrathyroidal (parathyroid). In view of the \par            history of hyperparathyroidism, follow-up radionuclide parathyroid scintigraphy can be performed \par            for further more specific evaluation in this regard. \par            ***Electronically Signed *** \par            ----------------------------------------------- \par            Donovan Hollis MD 05/15/18 \par            .\par            .\par            Impression: Based on above, I will ask her to stop by [facility] for sestamibi parathroid scan and to return after that.\par            can then decide on when and if to start another round of treatment for the osteoporosis. \par            .\par            ==\par            .\par            Feb 2, 2018\par            .\par            PCP: Dr. Supriya Segovia/Dr. Wojciech Lee\par             Gyn: Dr. Caity Reynolds\par             Urol: Dr. Uriel Alex (stones - one episode) \par             Oral surgeon: Dr. Nugent - dental implants\par            .\par            CC: Osteoporosis (prev. Forteo): Fosamax Jan 2014\par             Last BD: 12/12/2016\par             Asymptomatic kidney stones **\par             Hypercalciuria **\par             (Lyme )\par             History of partial thyroid lobectomy;\par             Thyroid nodules (sono 12/12/2016)\par             -FNAB 6/7/17 [facility] of 2.3 cm L nodule: neg \par             History of I-131 for hyperthyroidism\par             new: DVT LLE 6/2017.\par            .\par            Stopped a/c two weeks ago.\par            Stopped Fosamax end of Dec 2017 after 4 years. \par            . \par            Labs ([facility]) from \par            1/23/2018\par            TSH 2.66\par            calcium 10.3\par            ionized calcium 5.9 (4.8 - 5.6) ***\par            phos 3.4\par            PTH 64 (14-64) ***\par            25 OH vit D\par            CTXs 383 ****\par            .\par            Impression: Took two years of Forteo, 4 years alendronate, started "holiday" recently, has dental imiplants, eligible for BD Dec 2018 and will likely benefit from:\par            -sestamibi parathyroid scan\par            -Prolia after next BD\par             - need fu u/s thyroid around April and RDV May. \par            .\par            ==\par            .\par            Sept 15, 2017\par            .\par            PCP: Dr. Supriya Segovia\par             Gyn: Dr. Caity Reynolds\par             Urol: Dr. Uriel Alex (stones - one episode) \par            .\par            CC: Osteoporosis (prev. Forteo): Fosamax Jan 2014\par             Last BD: 12/12/2016\par             Asymptomatic kidney stones **\par             Hypercalciuria **\par             (Lyme )\par             History of partial thyroid lobectomy;\par             Thyroid nodules (sono 12/12/2016)\par             History of I-131 for hyperthyroidism\par             new: DVT LLE 6/2017.\par            .\par            Went for FNAB thyroid nodule at [facility] - cytology read as benign\par            .\par            Bone density stable and December 2017 she will have been on Fosamax for 4 years, so will advise "holiday".\par            .\par            Now on Xaralto because of L DVT:\par            .\par            .\par            "6/23/2017\par            IMPRESSION: Positive DVT study with thrombus extending from the posterior tibial vein into the popliteal vein."\par            .\par            My impression: \par            Labs at [facility] on \par            9/11/2017 included \par            calcium 10.2 (8.6-10.4)\par            phos 3.2\par            PTH 73 (14-64)\par            TSH 2.55 \par            25 OH vit D 28\par            CTXs 282 (on alendronate) \par            .\par            Impression: \par            FNAB thyroid at [facility] (after first at [facility]") reassuring.\par            .\par             Normocalcemic hyperparathyroidism.\par            Bone density stable.\par            Can go on "holiday" from alendronate.\par            Monitor calcium/PTH\par            ROV February. \par            Next BD ~Dec 2019\par            .\par            ==\par            .\par            May 5, 2017\par            .\par            PCP: Dr. Supriya Segovia\par             Gyn: Dr. Caity Reynolds\par             Urol: Dr. Uriel Alex (stones - one episode) \par            .\par            CC: Osteoporosis (prev. Forteo): Fosamax Jan 2014\par             Last BD: 12/12/2016\par             Asymptomatic kidney stones **\par             Hypercalciuria **\par             (Lyme )\par             History of partial thyroid lobectomy;\par             Thyroid nodules (sono 12/12/2016)\par             History of I-131 for hyperthyroidism\par             .\par            .\par            Imp/Plan:\par            .\par            Biopsy of dominant thyroid nodule at [facility] was not successful.\par            Went to [facility] for sonogram and Dr. Phipps also advised FNAB so will ask her to try there. \par            .\par            Bone density seems to have improved on Fosamax. End of this year will be four years and a reasonable time to consider a "holiday" if she and the rest of her team agree.\par            .\par            Monitor vit D and PTH level.\par            ROV in September. \par            .\par            .\par            .\par            ==\par            .\par            March 3, 2017\par            .\par            PCP: Dr. Supriya Segovia\par             Gyn: Dr. Caity Reynolds\par            .\par            CC: Osteoporosis (prev. Forteo): Fosamax Jan 2014\par             Last BD: 12/12/2016\par             Asymptomatic kidney stones \par             Hypercalciuria \par             (Lyme )\par             History of partial thyroid lobectomy;\par             Thyroid nodules (sono 12/12/2016)\par             History of I-131 for hyperthyroidism\par            .\par            [facility] \[facility]            .\par            Labs ([facility])\par            24 hour urine collection \par            1 g creatinine \par            calcium 287 (<250)\par            .\par            creatinine 0.81\par            uric acid 4.2\par            serum calcium 10.4 (8.6-10.4) \par            PTH 60 \par            25 OH vit D 27 \par            CTXs 278\par            .\par            Bone density stable\par            Teeth stable\par            Thyroid esme advises FNAB of nodule (she is s/p partial lobectomy in college and I-131 for hyperthyroidism several years ago)\par            .\par            Impression: Because of evidence of possible normocalcemic hyperparathyroidism, she went for ultrasound of thyroid/parathyroid and nodules detected with recommendation for FNAB \par            .\par            Osteoporosis currently stable. \par            .\par            Plan: ROV in two months (after FNAB of thyroid nodule)\par            .\par            ==\par            .;\par            October 7, 2016\par            .\par            PCP: Dr. Supriya Segovia\par             Gyn: Dr. Caity Reynolds\par            .\par            CC: Osteoporosis (prev. Forteo): Fosamax Jan 2014\par             (Lyme last \par            .\par            Dental issues are quiet\par            Remains on alendronate weekly.\par            .\par            Blood tests ([facility]) from \par            3/18/2016 inclued\par            BS 87 mg/dl\par            calcium 9.1\par            phos 3.1\par            TSH 2.28 \par            Vit B12 367\par            PTH 78 (14-65) \par            creat 0.79\par            vit D 35\par            ionized calcium - normal \par            .\par            Did not yet go for BD.\par            For problem with urination saw Uriel at [facility]. \par            sono showed bilateral tiny kidney stones. \par            stopped calcium supp after that. \par            (mother had stones)\par            .\par            Plan: sono thyroid\par            repeat PTH\par            24 hour urine ca/stone former\par            bone density\par            ROV Feb\par            continue Fosamax\par            .\par            ==\par            .\par            March 18, 2016\par            .\par            PCP: Dr. Supriya Segovia\par             Gyn: Dr. Caity Reynolds\par            .\par            CC: Osteoporosis (prev. Forteo): Fosamax Jan 2014\par             (Lyme last \par            .\par            Had bone grafting lower part of her mouth - last September.\par            .\par            Plan: Updated labs now.\par            ROV late October after BD\par            .\par            ==\par            .\par            June 5, 2015\par            .\par            PCP: Dr. Supriya Segovia\par             Gyn: Dr. Caity Reynolds\par            .\par            CC: Osteoporosis (prev. Forteo): Fosamax Jan 2014\par             (Lyme last \par            .\par            [facility]\par            .\par            Recent [facility] labs show 25 OH vit 41\par            CTXs 148\par            ionized calcium 5.7 (4.8-5.6) \par            .\par            Impression: Took Forteo for two years and has been on Fosamax since Jan 2014.\par            .\par            Impression: Dental issues are quiet. Serial bone densities show considerable improvement over time, especially in her spine. Last BD Sept 2014 showed spine T -2.6 Z -0.3.\par            .\par            Plan: Continue Fosamax for 3-5 years.\par            .Next BD ~ Oct 2016.\par            ROV here in March.\par            .\par            .\par            ===\par            October 28, 2014\par            PCP: Dr. Supriya Segovia\par            CC: Osteoporosis (prev. Forteo)\par            .\par            Treated for Lyme over the summer.\par            On Fosamax since Jan 2014.\par            April 2014 CTXs 192 (10/2013 CTXs 341).\par             vit D 36\par            .\par            Much of improvement in bone density was likely from the Forteo and she is now holding her own with Fosamax. \par            .\par            Dental implant fine.\par            .\par            Plan: Continue the Fosamax for up to 3 years and then consider Prolia.\par            ROV six months. \par            .\par            ===\par            April 8, 2014\par            PCP: Dr. Supriya Segovia\par            CC: Osteoporosois\par            .\par            Took Forteo for two years.\par            Because of dental issues preferred to take Calcitonin after that.\par            Switched to Fosamax around Januiary 2014.\par            Notes no symptoms from Fosamax.\par            Her densist, Dr. Nugent, is pleased with her progress.\par            She goes for teeth cleaning every 3 months.\par            .\par            Impression: Markers of bone turnover have decreased on Fosamax, predicting absorption and effectiveness.\par            .\par            Plan: Same Rx. Follow up BD two years after last. \par            .\par            .\par            ====\par            October 28, 2013\par            PCP: Dr. Supriya Segovia\par            CC: Osteoporosis (occasion hypercalcemia); previous Forteo\par            .\par            Blood tests at [facility] in July showed calcium 10.3, PTH 47\par            CTXs 342. \par            BSA 18.1 (14.2 - 42.7)\par            .\par            Labs from today still pending.\par            Dr. Nugent said: "Dr. Hellerman can do whatever he wants now."\par            Has a 3 month supply of calcitonin. \par            Plan: After finish\par            .\par            ========\par            March 26, 2013 Returns for osteoporosis. Has not yet received approval from Dr. Nugent to have more aggressive treatment than calcitonin (such as Fosamax, Reclast, or Prolia). She will be seeing Dr. Nugent within 3 months and will get his approval. In the meatime, she will stay on the calcitonin nasal spray. \par            .\par            Had blood tests last week at [facility] in [place]. \par  \par  ROS:  \par  \par

## 2020-05-06 NOTE — ASSESSMENT
[FreeTextEntry1] : Hypercalcemia\par Hyperparathyroidism\par Osteoporosis of spine\par Prolia will likely be next Rx.  \par ROV 3 months

## 2020-09-04 ENCOUNTER — APPOINTMENT (OUTPATIENT)
Dept: INTERNAL MEDICINE | Facility: CLINIC | Age: 80
End: 2020-09-04
Payer: MEDICARE

## 2020-09-04 VITALS
TEMPERATURE: 98.4 F | HEIGHT: 61 IN | SYSTOLIC BLOOD PRESSURE: 142 MMHG | WEIGHT: 151 LBS | BODY MASS INDEX: 28.51 KG/M2 | DIASTOLIC BLOOD PRESSURE: 80 MMHG | HEART RATE: 64 BPM

## 2020-09-04 VITALS — DIASTOLIC BLOOD PRESSURE: 70 MMHG | SYSTOLIC BLOOD PRESSURE: 140 MMHG

## 2020-09-04 PROCEDURE — 90662 IIV NO PRSV INCREASED AG IM: CPT

## 2020-09-04 PROCEDURE — 99204 OFFICE O/P NEW MOD 45 MIN: CPT | Mod: 25

## 2020-09-04 PROCEDURE — G0008: CPT

## 2020-09-04 RX ORDER — UBIDECARENONE/VIT E ACET 100MG-5
25 MCG CAPSULE ORAL
Refills: 0 | Status: ACTIVE | COMMUNITY
Start: 2020-09-04

## 2020-09-04 NOTE — HISTORY OF PRESENT ILLNESS
[FreeTextEntry8] : here to establish care, former px of dr Segovia, doing well, in the process to move to [place]

## 2020-09-29 ENCOUNTER — APPOINTMENT (OUTPATIENT)
Dept: INTERNAL MEDICINE | Facility: CLINIC | Age: 80
End: 2020-09-29
Payer: MEDICARE

## 2020-09-29 VITALS — DIASTOLIC BLOOD PRESSURE: 70 MMHG | SYSTOLIC BLOOD PRESSURE: 120 MMHG

## 2020-09-29 VITALS
SYSTOLIC BLOOD PRESSURE: 140 MMHG | BODY MASS INDEX: 27.94 KG/M2 | WEIGHT: 148 LBS | HEIGHT: 61 IN | TEMPERATURE: 97.5 F | DIASTOLIC BLOOD PRESSURE: 70 MMHG | OXYGEN SATURATION: 97 % | HEART RATE: 58 BPM

## 2020-09-29 PROCEDURE — 99213 OFFICE O/P EST LOW 20 MIN: CPT | Mod: 25

## 2020-09-29 PROCEDURE — G0009: CPT

## 2020-09-29 PROCEDURE — 90732 PPSV23 VACC 2 YRS+ SUBQ/IM: CPT

## 2020-09-29 NOTE — HISTORY OF PRESENT ILLNESS
[de-identified] : here for BP f/u  \par just completed her move to another house and feels better\par needs pneumovax

## 2020-10-19 ENCOUNTER — APPOINTMENT (OUTPATIENT)
Dept: INTERNAL MEDICINE | Facility: CLINIC | Age: 80
End: 2020-10-19
Payer: MEDICARE

## 2020-10-19 ENCOUNTER — LABORATORY RESULT (OUTPATIENT)
Age: 80
End: 2020-10-19

## 2020-10-19 VITALS
TEMPERATURE: 97.4 F | HEIGHT: 61 IN | WEIGHT: 148 LBS | DIASTOLIC BLOOD PRESSURE: 70 MMHG | HEART RATE: 72 BPM | BODY MASS INDEX: 27.94 KG/M2 | SYSTOLIC BLOOD PRESSURE: 120 MMHG

## 2020-10-19 DIAGNOSIS — N64.4 MASTODYNIA: ICD-10-CM

## 2020-10-19 DIAGNOSIS — Z76.89 PERSONS ENCOUNTERING HEALTH SERVICES IN OTHER SPECIFIED CIRCUMSTANCES: ICD-10-CM

## 2020-10-19 DIAGNOSIS — Z87.898 PERSONAL HISTORY OF OTHER SPECIFIED CONDITIONS: ICD-10-CM

## 2020-10-19 DIAGNOSIS — Z87.39 PERSONAL HISTORY OF OTHER DISEASES OF THE MUSCULOSKELETAL SYSTEM AND CONNECTIVE TISSUE: ICD-10-CM

## 2020-10-19 PROCEDURE — 36415 COLL VENOUS BLD VENIPUNCTURE: CPT

## 2020-10-19 PROCEDURE — 99213 OFFICE O/P EST LOW 20 MIN: CPT | Mod: 25

## 2020-10-19 PROCEDURE — G0438: CPT

## 2020-10-19 NOTE — PHYSICAL EXAM
[de-identified] : bilat normal no lumps [Normal] : no CVA tenderness, no spinal tenderness [de-identified] : small burn L wrist

## 2020-10-19 NOTE — HEALTH RISK ASSESSMENT
[Good] : ~his/her~  mood as  good [Yes] : Yes [Patient reported PAP Smear was normal] : Patient reported PAP Smear was normal [Patient reported mammogram was normal] : Patient reported mammogram was normal [Retired] : retired [With Family] : lives with family [College] : College [# Of Children ___] : has [unfilled] children [] :  [Designated Healthcare Proxy] : Designated healthcare proxy [With Patient/Caregiver] : With Patient/Caregiver [Name: ___] : Health Care Proxy's Name: [unfilled]  [Relationship: ___] : Relationship: [unfilled] [] : No [MammogramDate] : 01/20 [BoneDensityDate] : 01/18 [PapSmearDate] : 01/15 [ColonoscopyComments] : cologuard [AdvancecareDate] : 10/19/20

## 2020-10-20 LAB
ALBUMIN SERPL ELPH-MCNC: 4.5 G/DL
ALP BLD-CCNC: 72 U/L
ALT SERPL-CCNC: 16 U/L
ANION GAP SERPL CALC-SCNC: 14 MMOL/L
APPEARANCE: CLEAR
AST SERPL-CCNC: 14 U/L
BASOPHILS # BLD AUTO: 0.06 K/UL
BASOPHILS NFR BLD AUTO: 0.8 %
BILIRUB SERPL-MCNC: 0.5 MG/DL
BILIRUBIN URINE: NEGATIVE
BLOOD URINE: NEGATIVE
BUN SERPL-MCNC: 12 MG/DL
CALCIUM SERPL-MCNC: 9.7 MG/DL
CALCIUM SERPL-MCNC: 9.7 MG/DL
CHLORIDE SERPL-SCNC: 105 MMOL/L
CHOLEST SERPL-MCNC: 209 MG/DL
CHOLEST/HDLC SERPL: 2.8 RATIO
CO2 SERPL-SCNC: 24 MMOL/L
COLOR: NORMAL
CREAT SERPL-MCNC: 0.72 MG/DL
EOSINOPHIL # BLD AUTO: 0.04 K/UL
EOSINOPHIL NFR BLD AUTO: 0.5 %
FOLATE SERPL-MCNC: 13.7 NG/ML
GLUCOSE QUALITATIVE U: NEGATIVE
GLUCOSE SERPL-MCNC: 88 MG/DL
HCT VFR BLD CALC: 44.1 %
HDLC SERPL-MCNC: 75 MG/DL
HGB BLD-MCNC: 14.2 G/DL
IMM GRANULOCYTES NFR BLD AUTO: 0.1 %
KETONES URINE: NEGATIVE
LDLC SERPL CALC-MCNC: 122 MG/DL
LEUKOCYTE ESTERASE URINE: ABNORMAL
LYMPHOCYTES # BLD AUTO: 2.31 K/UL
LYMPHOCYTES NFR BLD AUTO: 30.1 %
MAN DIFF?: NORMAL
MCHC RBC-ENTMCNC: 31.7 PG
MCHC RBC-ENTMCNC: 32.2 GM/DL
MCV RBC AUTO: 98.4 FL
MONOCYTES # BLD AUTO: 0.47 K/UL
MONOCYTES NFR BLD AUTO: 6.1 %
NEUTROPHILS # BLD AUTO: 4.78 K/UL
NEUTROPHILS NFR BLD AUTO: 62.4 %
NITRITE URINE: NEGATIVE
PARATHYROID HORMONE INTACT: 71 PG/ML
PH URINE: 6
PLATELET # BLD AUTO: 216 K/UL
POTASSIUM SERPL-SCNC: 4.4 MMOL/L
PROT SERPL-MCNC: 6.6 G/DL
PROTEIN URINE: NEGATIVE
RBC # BLD: 4.48 M/UL
RBC # FLD: 12.8 %
SODIUM SERPL-SCNC: 144 MMOL/L
SPECIFIC GRAVITY URINE: 1.01
T4 FREE SERPL-MCNC: 1.1 NG/DL
TRIGL SERPL-MCNC: 63 MG/DL
TSH SERPL-ACNC: 1.99 UIU/ML
UROBILINOGEN URINE: NORMAL
VIT B12 SERPL-MCNC: 491 PG/ML
WBC # FLD AUTO: 7.67 K/UL

## 2020-11-25 ENCOUNTER — APPOINTMENT (OUTPATIENT)
Dept: ENDOCRINOLOGY | Facility: CLINIC | Age: 80
End: 2020-11-25
Payer: MEDICARE

## 2020-11-25 VITALS
SYSTOLIC BLOOD PRESSURE: 122 MMHG | WEIGHT: 150 LBS | DIASTOLIC BLOOD PRESSURE: 76 MMHG | BODY MASS INDEX: 28.32 KG/M2 | HEIGHT: 61 IN | OXYGEN SATURATION: 98 % | HEART RATE: 56 BPM

## 2020-11-25 PROCEDURE — 99214 OFFICE O/P EST MOD 30 MIN: CPT

## 2020-11-26 NOTE — ASSESSMENT
[FreeTextEntry1] : Osteoporosis\par Hypercalciuria\par Normocalcemia hyperparathroidism\par \par Eligible for bone density after December 18, 2020 and will likely start on Prolia after that.

## 2020-12-21 ENCOUNTER — RESULT REVIEW (OUTPATIENT)
Age: 80
End: 2020-12-21

## 2021-01-26 ENCOUNTER — RESULT REVIEW (OUTPATIENT)
Age: 81
End: 2021-01-26

## 2021-01-28 DIAGNOSIS — N60.02 SOLITARY CYST OF LEFT BREAST: ICD-10-CM

## 2021-01-29 ENCOUNTER — RESULT REVIEW (OUTPATIENT)
Age: 81
End: 2021-01-29

## 2021-01-29 PROBLEM — N60.02 CYST OF LEFT BREAST: Status: ACTIVE | Noted: 2021-01-29

## 2021-02-03 ENCOUNTER — RESULT REVIEW (OUTPATIENT)
Age: 81
End: 2021-02-03

## 2021-02-04 ENCOUNTER — RESULT REVIEW (OUTPATIENT)
Age: 81
End: 2021-02-04

## 2021-02-11 ENCOUNTER — APPOINTMENT (OUTPATIENT)
Dept: ENDOCRINOLOGY | Facility: CLINIC | Age: 81
End: 2021-02-11
Payer: MEDICARE

## 2021-02-11 PROCEDURE — 99214 OFFICE O/P EST MOD 30 MIN: CPT

## 2021-02-11 NOTE — HISTORY OF PRESENT ILLNESS
[FreeTextEntry1] : Feb 11, 2021   phone i     [phone] \par \par PCP:     Dr. Catie Mcnally\par             Gyn: Dr. Caity Reynolds\par             Urol: Dr. Uriel Alex (stones - one episode) \par             Oral surgeon: Dr. Nugent - dental implants\par            .\par            CC: Osteoporosis (prev. Forteo): Fosamax Jan 2014 2/23/2018  CTX s 383  ionized Ca  5.9 (<5.6) [facility]\par                               1/31/2019:  no compression fractures.  Low vit D  \par                                12/18/2018:  BD Lin Spine T -3.0, TH -1.4, FN -2.2, forearm -2.5\par                               11/8/19 hypercalcemia  Ca 10.5 (8.6 - 10.4) [facility]  CTXs 429  Vit D 30 (no PTH)\par                   Thyroid nodule  2.3 cm L, FNAB 6/2017 [facility] - benign- low cellularity\par                   Kidney stones  10/2015 [facility] - asymptomatic - saw Dr. Alex\par                    Hypercalciuria\par \par Visits for osteoporosis (also thyroid nodule, hyperparathroid)\par Recenty received second Pfizer Covid vaccine.\par \par Has not required any oral surgery in past few years.\par She used to have low vitamin D, now takes 1000 iu daily\par Spine T -3.1, Z -0.6.   T down from previus T of -3.0\par \par Impression:  Not good candidate for parathryoid surgery.\par Plan:  Consider for Prolia  \par \par \par \par Nov 25, 2020    in person\par \par PCP:     Dr. Catie Mcnally\par             Gyn: Dr. Caity Reynolds\par             Urol: Dr. Uriel Alex (stones - one episode) \par             Oral surgeon: Dr. Nugent - dental implants\par            .\par            CC: Osteoporosis (prev. Forteo): Fosamax Jan 2014 2/23/2018  CTX s 383  ionized Ca  5.9 (<5.6) [facility]\par                               1/31/2019:  no compression fractures.  Low vit D  \par                                12/18/2018:  BD Lin Spine T -3.0, TH -1.4, FN -2.2, forearm -2.5\par                               11/8/19 hypercalcemia  Ca 10.5 (8.6 - 10.4) [facility]  CTXs 429  Vit D 30 (no PTH)\par                   Thyroid nodule  2.3 cm L, FNAB 6/2017 [facility] - benign- low cellularity\par                   Kidney stones  10/2015 [facility] - asymptomatic - saw Dr. Alex\par                    Hypercalciuria\par \par Saw her oral surgeon, Dr. Nugent, in the Spring.   No tooth extractions or dental implants planned in foreseeable future.\par 4/30/20  [facility] included calcium 10.1 PTH 94\par osteocalcin 27\par \par vit D 30\par 1,25 82 (18-72)\par 24 hour urine calcium 255,   creat 0.84 gm\par \par : :\par Constitutional:  Alert, well nourished, healthy appearance, no acute distress \par Eyes:  No proptosis, no lid lag\par Thyroid:\par Pulmonary:  No respiratory distress, no accessory muscle use; normal rate and effort\par Cardiac:  Normal rate\par Vascular: \par Endocrine:  No stigmata of Cushing’s Syndrome\par Musculoskeletal:  Normal gait, no involuntary movements\par Neurology:  No tremors\par Affect/Mood/Psych:  Oriented x 3; normal affect, normal insight/judgement, normal mood \par .\par \par Impression:  Osteoporosis of spine.    No compression or other fractures.\par Hypercalciuria with normocalcemic hyperparathyroidism.  \par \par Impression:  She is eligible for follow up bone density after December 18, 2020 and will likely go for\par Prolia injection after that.  \par \par \par October 20, 2020 labs at [facility] included\par TSH 1.99\par B12 491\par PTH 71 (15-65)\par calcium 9.7  \par \par \par \par \par May 05, 2020  Telephone Visit\par \par PCP:     Dr. ZEB Saleh\par             Gyn: Dr. Caity Reynolds\par             Urol: Dr. Ureil Alex (stones - one episode) \par             Oral surgeon: Dr. Nugent - dental implants\par            .\par            CC: Osteoporosis (prev. Forteo): Fosamax Jan 2014 2/23/2018  CTX s 383  ionized Ca  5.9 (<5.6) [facility]\par                               1/31/2019:  no compression fractures.  Low vit D  \par                                6/27/19 BD Lin Spine T -3.0, TH -1.4, FN -2.2, forearm -2.5\par                               11/8/19 hypercalcemia  Ca 10.5 (8.6 - 10.4) [facility]  CTXs 429  Vit D 30 (no PTH)\par                   Thyroid nodule  2.3 cm L, FNAB 6/2017 [facility] - benign- low cellularity\par                   Kidney stones  10/2015 [facility] - asymptomatic \par \par No history of fracture.  Spine T score -3.0  12/18/2018\par \par Recent labs include\par 24 hour urine calcium of 255 mg\par 25 OH vit D 30 on ~1000 iu daily.\par PTH 94 (<94)\par \par Labs at [facility] 11/8/2019 included\par calcium 10.5\par \par \par \par Impression:  Trajectory of spine bone density previously improved, but appears to have plateaued and perhaps may be heading back down.  Not an ideal candidate for PTX.   May consider Prolia in the future.  \par \par Impression:  On "holiday" from alendronate.  \par \par \par Nov 12, 2019\par \par PCP:     Dr. ZEB Saleh\par             Gyn: Dr. Catiy Reynolds\par             Urol: Dr. Uriel Alex (stones - one episode) \par             Oral surgeon: Dr. Nugent - dental implants\par            .\par            CC: Osteoporosis (prev. Forteo): Fosamax Jan 2014 2/23/2018  CTX s 383  ionized Ca  5.9 (<5.6) [facility]\par                               1/31/2019:  no compression fractures.  Low vit D  \par                                6/27/19 BD Lin Spine T -3.0, TH -1.4, FN -2.2, forearm -2.5\par                               11/8/19 hypercalcemia  Ca 10.5 (8.6 - 10.4) [facility]  CTXs 429  Vit D 30 (no PTH)\par                   Thyroid nodule  2.3 cm L, FNAB 6/2017 [facility] - benign- low cellularity\par                   Kidney stones  10/2015 [facility] - asymptomatic \par \par \par #  Will ask her to have f/u US thyroid within next year.\par \par #  Osteoporos of spine \par         Has been on "holiday" from alendronate.  \par \par         Impression:  Previously diagnosed with normocalcemic hyperparathryoidism; however, most recent\par           [facility] calcium slightly elevated at 10.5, normal up to 10.4, with normal renal function and\par          25 OH vitmin D level of 30  (but no PTH)\par \par           X-ray spine neg:\par               Lumbosacral Spine.\par \par Frontal, lateral and cone-down projections demonstrate satisfactory alignment of the bony structures. No fracture or vertebral compression can be appreciated. There is no evidence of pedicle destruction. Intervertebral disc spaces are satisfactorily maintained. The sacroiliac joints are symmetric. As compared to the patient's previous study of 9/29/2014, there has been no significant change of an adverse nature.\par \par \par IMPRESSION: No evidence of lumbar compression deformity. No significant change from 9/29/2014.\par \par \par ***Electronically Signed ***\par -----------------------------------------------\par Wojciech Biggs MD              01/31/19 1454\par \par \par IMPRESSION:\par \par No focal site of initial increased and persistent sestamibi tracer localization is evident to specifically suggest uptake in parathyroid adenoma or hyperplasia.\par \par The left thyroid lobe is asymmetrically much larger than the right with much more intense tracer localization.\par \par \par \par \par ***Electronically Signed ***\par -----------------------------------------------\par Donovan oHllis MD              12/06/18 \par \par Imp:  Sestamibi negative.      \par Plan:  24 hour Ca at [facility] with PTH, vit D before next visit in 6 months - will likely decide on\par Prolia at that time; however, will also reconsider evaluation of parathyroid.  \par \par \par \par \par June 27, 2019\par \par PCP:     Dr. ZEB Saleh (to see) ******\par             Gyn: Dr. Caity Reynolds\par             Urol: Dr. Uriel Alex (stones - one episode) \par             Oral surgeon: Dr. Nugent - dental implants\par            .\par            CC: Osteoporosis (prev. Forteo): Fosamax Jan 2014\par \par 79 yo on "holiday" from Fosamax.  \par Lab tests from yesterday at [facility] include \par TSH 1.81\par 25 OH vit D 24\par \par Lumbar spine, L1 to L2, total: 0.647 gm./cm.2.\par \par T-score: -3.0\par \par Z-score: -0.6\par \par Left hip: 0.767 gm./cm.2.\par \par T-score: -1.4 \par \par Z-score: 0.6\par \par Left femoral neck: 0.606\par \par T score: -2.2\par \par Z score: 0.1\par \par  Left forearm, distal third: 0.544\par \par T score: -2.5\par \par Z score: 0.5\par \par On holiday from alendronate.\par \par Last dental implant a few weeks ago.   After a bone graft !\par No other implants on the horizon.\par Plan:  Updated labs late Nov, ROV Dec.\par Likely Prolia next time around  \par \par January 31, 2019\par \par PCP: Dr. Supriya Segovia/Dr. Wojciech Lee\par             Gyn: Dr. Caity Reynolds\par             Urol: Dr. Uriel Alex (stones - one episode) \par             Oral surgeon: Dr. Nugent - dental implants\par            .\par            CC: Osteoporosis (prev. Forteo): Fosamax Jan 2014\par             Last BD: 12/12/2016\par             Asymptomatic kidney stones **\par             Hypercalciuria **\par             (Lyme )\par             History of partial thyroid lobectomy;\par             Thyroid nodules (sono 12/12/2016)\par             -FNAB 6/7/17 [facility] of 2.3 cm L nodule: neg \par             History of I-131 for hyperthyroidism\par             new: DVT LLE 6/2017.\par            " IMPRESSION: No significant change in the appearance of the thyroid since 12/12/2016, including a \par            previously biopsied left lower pole nodule, and an hypoechoic nodule at the posterior aspect of the \par            left upper pole that could be either exophytic or extrathyroidal (parathyroid). In view of the \par            history of hyperparathyroidism, follow-up radionuclide parathyroid scintigraphy can be performed \par            for further more specific evaluation in this regard. \par            ***Electronically Signed *** \par            ----------------------------------------------- \par            Donovan Hollis MD 05/15/18 \par \par \par Recent bone density shows spine T -3.1 and trending down.\par Ultrasound thyroid, no significant change since 12/12/2016 including previously biopsied nodule.  \par Sestamibi parathyroid scan : negative  \par Recent [facility] ionized calcium 5.7 (4.9 - 5.6) **\par PTH 73 (14-64) \par CTXs  653\par osteocalcin 24\par phos 3.3\par TSH 2.88\par vit D 28\par \par No compression fx in 2014.\par \par Imp:  Normocalcemic hyperparathyroid.\par Would benefit from antiresorptive.\par Consider Evenity when available.\par ROV 6 months\par \par \par Previous notes from [employer] appended below.\par \par  June 22, 2018\par            .\par            PCP: Dr. Supriya Segovia/Dr. Wojciech Lee\par             Gyn: Dr. Caity Reynolds\par             Urol: Dr. Uriel Alex (stones - one episode) \par             Oral surgeon: Dr. Nugent - dental implants\par            .\par            CC: Osteoporosis (prev. Forteo): Fosamax Jan 2014\par             Last BD: 12/12/2016\par             Asymptomatic kidney stones **\par             Hypercalciuria **\par             (Lyme )\par             History of partial thyroid lobectomy;\par             Thyroid nodules (sono 12/12/2016)\par             -FNAB 6/7/17 [facility] of 2.3 cm L nodule: neg \par             History of I-131 for hyperthyroidism\par             new: DVT LLE 6/2017.\par            " IMPRESSION: No significant change in the appearance of the thyroid since 12/12/2016, including a \par            previously biopsied left lower pole nodule, and an hypoechoic nodule at the posterior aspect of the \par            left upper pole that could be either exophytic or extrathyroidal (parathyroid). In view of the \par            history of hyperparathyroidism, follow-up radionuclide parathyroid scintigraphy can be performed \par            for further more specific evaluation in this regard. \par            ***Electronically Signed *** \par            ----------------------------------------------- \par            Donovan Hollis MD 05/15/18 \par            .\par            .\par            Impression: Based on above, I will ask her to stop by [facility] for sestamibi parathroid scan and to return after that.\par            can then decide on when and if to start another round of treatment for the osteoporosis. \par            .\par            ==\par            .\par            Feb 2, 2018\par            .\par            PCP: Dr. Supriya Segovia/Dr. Wojciech Lee\par             Gyn: Dr. Caity Reynolds\par             Urol: Dr. Uriel Alex (stones - one episode) \par             Oral surgeon: Dr. Nugent - dental implants\par            .\par            CC: Osteoporosis (prev. Forteo): Fosamax Jan 2014\par             Last BD: 12/12/2016\par             Asymptomatic kidney stones **\par             Hypercalciuria **\par             (Lyme )\par             History of partial thyroid lobectomy;\par             Thyroid nodules (sono 12/12/2016)\par             -FNAB 6/7/17 [facility] of 2.3 cm L nodule: neg \par             History of I-131 for hyperthyroidism\par             new: DVT LLE 6/2017.\par            .\par            Stopped a/c two weeks ago.\par            Stopped Fosamax end of Dec 2017 after 4 years. \par            . \par            Labs ([facility]) from \par            1/23/2018\par            TSH 2.66\par            calcium 10.3\par            ionized calcium 5.9 (4.8 - 5.6) ***\par            phos 3.4\par            PTH 64 (14-64) ***\par            25 OH vit D\par            CTXs 383 ****\par            .\par            Impression: Took two years of Forteo, 4 years alendronate, started "holiday" recently, has dental imiplants, eligible for BD Dec 2018 and will likely benefit from:\par            -sestamibi parathyroid scan\par            -Prolia after next BD\par             - need fu u/s thyroid around April and RDV May. \par            .\par            ==\par            .\par            Sept 15, 2017\par            .\par            PCP: Dr. Supriya Segovia\par             Gyn: Dr. Caity Reynolds\par             Urol: Dr. Uriel Alex (stones - one episode) \par            .\par            CC: Osteoporosis (prev. Forteo): Fosamax Jan 2014\par             Last BD: 12/12/2016\par             Asymptomatic kidney stones **\par             Hypercalciuria **\par             (Lyme )\par             History of partial thyroid lobectomy;\par             Thyroid nodules (sono 12/12/2016)\par             History of I-131 for hyperthyroidism\par             new: DVT LLE 6/2017.\par            .\par            Went for FNAB thyroid nodule at [facility] - cytology read as benign\par            .\par            Bone density stable and December 2017 she will have been on Fosamax for 4 years, so will advise "holiday".\par            .\par            Now on Xaralto because of L DVT:\par            .\par            .\par            "6/23/2017\par            IMPRESSION: Positive DVT study with thrombus extending from the posterior tibial vein into the popliteal vein."\par            .\par            My impression: \par            Labs at [facility] on \par            9/11/2017 included \par            calcium 10.2 (8.6-10.4)\par            phos 3.2\par            PTH 73 (14-64)\par            TSH 2.55 \par            25 OH vit D 28\par            CTXs 282 (on alendronate) \par            .\par            Impression: \par            FNAB thyroid at [facility] (after first at [facility] "NS") reassuring.\par            .\par             Normocalcemic hyperparathyroidism.\par            Bone density stable.\par            Can go on "holiday" from alendronate.\par            Monitor calcium/PTH\par            ROV February. \par            Next BD ~Dec 2019\par            .\par            ==\par            .\par            May 5, 2017\par            .\par            PCP: Dr. Supriya Segovia\par             Gyn: Dr. Caity Reynolds\par             Urol: Dr. Uriel Alex (stones - one episode) \par            .\par            CC: Osteoporosis (prev. Forteo): Fosamax Jan 2014\par             Last BD: 12/12/2016\par             Asymptomatic kidney stones **\par             Hypercalciuria **\par             (Lyme )\par             History of partial thyroid lobectomy;\par             Thyroid nodules (sono 12/12/2016)\par             History of I-131 for hyperthyroidism\par             .\par            .\par            Imp/Plan:\par            .\par            Biopsy of dominant thyroid nodule at [facility] was not successful.\par            Went to [facility] for sonogram and Dr. Phipps also advised FNAB so will ask her to try there. \par            .\par            Bone density seems to have improved on Fosamax. End of this year will be four years and a reasonable time to consider a "holiday" if she and the rest of her team agree.\par            .\par            Monitor vit D and PTH level.\par            ROV in September. \par            .\par            .\par            .\par            ==\par            .\par            March 3, 2017\par            .\par            PCP: Dr. Supriya Segovia\par             Gyn: Dr. Caity Reynolds\par            .\par            CC: Osteoporosis (prev. Forteo): Fosamax Jan 2014\par             Last BD: 12/12/2016\par             Asymptomatic kidney stones \par             Hypercalciuria \par             (Lyme )\par             History of partial thyroid lobectomy;\par             Thyroid nodules (sono 12/12/2016)\par             History of I-131 for hyperthyroidism\par            .\par            Visited [facility] and enjoyed it. \par            .\par            Labs ([facility])\par            24 hour urine collection \par            1 g creatinine \par            calcium 287 (<250)\par            .\par            creatinine 0.81\par            uric acid 4.2\par            serum calcium 10.4 (8.6-10.4) \par            PTH 60 \par            25 OH vit D 27 \par            CTXs 278\par            .\par            Bone density stable\par            Teeth stable\par            Thyroid sono advises FNAB of nodule (she is s/p partial lobectomy in college and I-131 for hyperthyroidism several years ago)\par            .\par            Impression: Because of evidence of possible normocalcemic hyperparathyroidism, she went for ultrasound of thyroid/parathyroid and nodules detected with recommendation for FNAB \par            .\par            Osteoporosis currently stable. \par            .\par            Plan: ROV in two months (after FNAB of thyroid nodule)\par            .\par            ==\par            .;\par            October 7, 2016\par            .\par            PCP: Dr. Supriya Segovia\par             Gyn: Dr. Caity Reynolds\par            .\par            CC: Osteoporosis (prev. Forteo): Fosamax Jan 2014\par             (Lyme last \par            .\par            Dental issues are quiet\par            Remains on alendronate weekly.\par            .\par            Blood tests ([facility]) from \par            3/18/2016 inclued\par            BS 87 mg/dl\par            calcium 9.1\par            phos 3.1\par            TSH 2.28 \par            Vit B12 367\par            PTH 78 (14-65) \par            creat 0.79\par            vit D 35\par            ionized calcium - normal \par            .\par            Did not yet go for BD.\par            For problem with urination saw Uriel at [facility]. \par            sono showed bilateral tiny kidney stones. \par            stopped calcium supp after that. \par            (mother had stones)\par            .\par            Plan: sono thyroid\par            repeat PTH\par            24 hour urine ca/stone former\par            bone density\par            ROV Feb\par            continue Fosamax\par            .\par            ==\par            .\par            March 18, 2016\par            .\par            PCP: Dr. Supriya Segovia\par             Gyn: Dr. Caity Reynolds\par            .\par            CC: Osteoporosis (prev. Forteo): Fosamax Jan 2014\par             (Lyme last \par            .\par            Had bone grafting lower part of her mouth - last September.\par            .\par            Plan: Updated labs now.\par            ROV late October after BD\par            .\par            ==\par            .\par            June 5, 2015\par            .\par            PCP: Dr. Supriya Segovia\par             Gyn: Dr. Caity Reynolds\par            .\par            CC: Osteoporosis (prev. Forteo): Fosamax Jan 2014\par             (Lyme last \par            .\par            [facility]\par            .\par            Recent [facility] labs show 25 OH vit 41\par            CTXs 148\par            ionized calcium 5.7 (4.8-5.6) \par            .\par            Impression: Took Forteo for two years and has been on Fosamax since Jan 2014.\par            .\par            Impression: Dental issues are quiet. Serial bone densities show considerable improvement over time, especially in her spine. Last BD Sept 2014 showed spine T -2.6 Z -0.3.\par            .\par            Plan: Continue Fosamax for 3-5 years.\par            .Next BD ~ Oct 2016.\par            ROV here in March.\par            .\par            .\par            ===\par            October 28, 2014\par            PCP: Dr. Supriya Segovia\par            CC: Osteoporosis (prev. Forteo)\par            .\par            Treated for Lyme over the summer.\par            On Fosamax since Jan 2014.\par            April 2014 CTXs 192 (10/2013 CTXs 341).\par             vit D 36\par            .\par            Much of improvement in bone density was likely from the Forteo and she is now holding her own with Fosamax. \par            .\par            Dental implant fine.\par            .\par            Plan: Continue the Fosamax for up to 3 years and then consider Prolia.\par            ROV six months. \par            .\par            ===\par            April 8, 2014\par            PCP: Dr. Supriya Segovia\par            CC: Osteoporosois\par            .\par            Took Forteo for two years.\par            Because of dental issues preferred to take Calcitonin after that.\par            Switched to Fosamax around Januiary 2014.\par            Notes no symptoms from Fosamax.\par            Her densist, Dr. Nugent, is pleased with her progress.\par            She goes for teeth cleaning every 3 months.\par            .\par            Impression: Markers of bone turnover have decreased on Fosamax, predicting absorption and effectiveness.\par            .\par            Plan: Same Rx. Follow up BD two years after last. \par            .\par            .\par            ====\par            October 28, 2013\par            PCP: Dr. Supriya Segovia\par            CC: Osteoporosis (occasion hypercalcemia); previous Forteo\par            .\par            Blood tests at [facility] in July showed calcium 10.3, PTH 47\par            CTXs 342. \par            BSA 18.1 (14.2 - 42.7)\par            .\par            Labs from today still pending.\par            Dr. Nugent said: "Dr. Hellerman can do whatever he wants now."\par            Has a 3 month supply of calcitonin. \par            Plan: After finish\par            .\par            ========\par            March 26, 2013 Returns for osteoporosis. Has not yet received approval from Dr. Nugent to have more aggressive treatment than calcitonin (such as Fosamax, Reclast, or Prolia). She will be seeing Dr. Nugent within 3 months and will get his approval. In the meatime, she will stay on the calcitonin nasal spray. \par            .\par            Had blood tests last week at [facility] in [place]. \par  \par  ROS:  \par  \par

## 2021-02-11 NOTE — ASSESSMENT
[FreeTextEntry1] : Lab tests from Oct 20, 2020 included\par calcium 9.7\par PTH 71\par Consistent with diagnosis of normocalcemic hyperparathyroidism.\par \par Plan:  Updated vitamin D level before initiating\par Prolia.\par Riisks, benefits, alternatives discussed.

## 2021-06-02 ENCOUNTER — LABORATORY RESULT (OUTPATIENT)
Age: 81
End: 2021-06-02

## 2021-06-02 ENCOUNTER — APPOINTMENT (OUTPATIENT)
Dept: INTERNAL MEDICINE | Facility: CLINIC | Age: 81
End: 2021-06-02
Payer: MEDICARE

## 2021-06-02 VITALS
DIASTOLIC BLOOD PRESSURE: 60 MMHG | TEMPERATURE: 97.4 F | HEIGHT: 61 IN | BODY MASS INDEX: 28.32 KG/M2 | SYSTOLIC BLOOD PRESSURE: 124 MMHG | WEIGHT: 150 LBS | HEART RATE: 69 BPM | OXYGEN SATURATION: 98 %

## 2021-06-02 DIAGNOSIS — R82.994 HYPERCALCIURIA: ICD-10-CM

## 2021-06-02 PROCEDURE — 36415 COLL VENOUS BLD VENIPUNCTURE: CPT

## 2021-06-02 PROCEDURE — 99214 OFFICE O/P EST MOD 30 MIN: CPT | Mod: 25

## 2021-06-02 NOTE — HISTORY OF PRESENT ILLNESS
[de-identified] : here for medical f/u , c/o LBP . mostly 1/2 half of the day , nonradiated , able to do all ADLs, participates in gym classes at [facility], no UTI sx  or other sx

## 2021-06-03 LAB
ALBUMIN SERPL ELPH-MCNC: 4.5 G/DL
ALP BLD-CCNC: 51 U/L
ALT SERPL-CCNC: 13 U/L
ANION GAP SERPL CALC-SCNC: 10 MMOL/L
APPEARANCE: CLEAR
AST SERPL-CCNC: 14 U/L
BILIRUB SERPL-MCNC: 0.4 MG/DL
BILIRUBIN URINE: NEGATIVE
BLOOD URINE: NEGATIVE
BUN SERPL-MCNC: 16 MG/DL
CALCIUM SERPL-MCNC: 10.7 MG/DL
CHLORIDE SERPL-SCNC: 104 MMOL/L
CO2 SERPL-SCNC: 29 MMOL/L
COLOR: NORMAL
CREAT SERPL-MCNC: 0.84 MG/DL
GLUCOSE QUALITATIVE U: NEGATIVE
GLUCOSE SERPL-MCNC: 89 MG/DL
KETONES URINE: NEGATIVE
LEUKOCYTE ESTERASE URINE: ABNORMAL
NITRITE URINE: NEGATIVE
PH URINE: 6.5
POTASSIUM SERPL-SCNC: 4.5 MMOL/L
PROT SERPL-MCNC: 6.7 G/DL
PROTEIN URINE: NEGATIVE
SODIUM SERPL-SCNC: 142 MMOL/L
SPECIFIC GRAVITY URINE: 1.02
UROBILINOGEN URINE: NORMAL

## 2021-06-04 LAB
CALCIUM SERPL-MCNC: 10.7 MG/DL
PARATHYROID HORMONE INTACT: 43 PG/ML

## 2021-09-03 ENCOUNTER — APPOINTMENT (OUTPATIENT)
Dept: ENDOCRINOLOGY | Facility: CLINIC | Age: 81
End: 2021-09-03
Payer: MEDICARE

## 2021-09-03 DIAGNOSIS — E04.1 NONTOXIC SINGLE THYROID NODULE: ICD-10-CM

## 2021-09-03 PROCEDURE — 99214 OFFICE O/P EST MOD 30 MIN: CPT | Mod: 95

## 2021-09-04 PROBLEM — E04.1 THYROID NODULE: Status: ACTIVE | Noted: 2019-01-28

## 2021-09-26 LAB
25(OH)D3 SERPL-MCNC: 33.9 NG/ML
ANION GAP SERPL CALC-SCNC: 10 MMOL/L
BUN SERPL-MCNC: 13 MG/DL
CA-I SERPL-SCNC: 1.33 MMOL/L
CALCIUM SERPL-MCNC: 10.1 MG/DL
CALCIUM SERPL-MCNC: 10.1 MG/DL
CHLORIDE SERPL-SCNC: 105 MMOL/L
CO2 SERPL-SCNC: 28 MMOL/L
COLLAGEN CTX SERPL-MCNC: 70 PG/ML
CREAT SERPL-MCNC: 0.81 MG/DL
GLUCOSE SERPL-MCNC: 88 MG/DL
OSTEOCALCIN SERPL-MCNC: 5.5 NG/ML
PARATHYROID HORMONE INTACT: 78 PG/ML
PHOSPHATE SERPL-MCNC: 3.2 MG/DL
POTASSIUM SERPL-SCNC: 5.1 MMOL/L
SODIUM SERPL-SCNC: 142 MMOL/L

## 2021-10-11 ENCOUNTER — APPOINTMENT (OUTPATIENT)
Dept: GERIATRICS | Facility: CLINIC | Age: 81
End: 2021-10-11
Payer: MEDICARE

## 2021-10-11 VITALS
SYSTOLIC BLOOD PRESSURE: 110 MMHG | DIASTOLIC BLOOD PRESSURE: 60 MMHG | RESPIRATION RATE: 16 BRPM | TEMPERATURE: 97 F | OXYGEN SATURATION: 97 % | HEART RATE: 76 BPM

## 2021-10-11 DIAGNOSIS — Z92.29 PERSONAL HISTORY OF OTHER DRUG THERAPY: ICD-10-CM

## 2021-10-11 PROCEDURE — 69210 REMOVE IMPACTED EAR WAX UNI: CPT

## 2021-10-11 PROCEDURE — 99212 OFFICE O/P EST SF 10 MIN: CPT | Mod: 25

## 2021-10-11 NOTE — PHYSICAL EXAM
[No Respiratory Distress] : no respiratory distress [Respiration, Rhythm And Depth] : normal respiratory rhythm and effort [Normal] : heart rate was normal and rhythm regular, normal S1 and S2 [de-identified] : bilateral cerumen

## 2021-10-11 NOTE — HISTORY OF PRESENT ILLNESS
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

## 2022-01-12 ENCOUNTER — APPOINTMENT (OUTPATIENT)
Dept: GERIATRICS | Facility: CLINIC | Age: 82
End: 2022-01-12

## 2022-02-02 ENCOUNTER — APPOINTMENT (OUTPATIENT)
Dept: ENDOCRINOLOGY | Facility: CLINIC | Age: 82
End: 2022-02-02
Payer: MEDICARE

## 2022-02-02 PROCEDURE — 99214 OFFICE O/P EST MOD 30 MIN: CPT | Mod: 95

## 2022-02-02 NOTE — HISTORY OF PRESENT ILLNESS
[FreeTextEntry1] : Feb 02, 2022       iPhone (she resides at [facility])\par \par PCP:     Dr. Catie Mcnally\par             Gyn: Dr. Caity Reynolds\par             Urol: Dr. Uriel Alex (stones - one episode) \par             Oral surgeon: Dr. Nugent - dental implants\par            .\par            CC: Osteoporosis (prev. Forteo): Fosamax Jan 2014 2/23/2018  CTX s 383  ionized Ca  5.9 (<5.6) [facility]\par                               1/31/2019:  no compression fractures.  Low vit D  \par                                12/18/2018:  BD [facility] Spine T -3.0, TH -1.4, FN -2.2, forearm -2.5\par                               11/8/19 hypercalcemia  Ca 10.5 (8.6 - 10.4) [facility]  CTXs 429  Vit D 30 (no PTH)\par                                   4/30/20 [facility]:  PTH 94;  CTXs 598\par                                12/21/2020:  BD [facility]  Spine T -3.1\par                   Thyroid nodule  2.3 cm L, FNAB 6/2017 [facility] - benign- low cellularity\par                   Kidney stones  10/2015 [facility] - asymptomatic - saw Dr. Alex\par                    Hypercalciuria   (5/2020 calcium 302 mg/w hrs at [facility])\par \par \par Last Prolia 9/27/2021 and so next likely 3/27/2022 - Rx sent and she will also call\par Getting calcium in diet and taking 1000 iu vit D daily  \par \par Imp:  Doing well, no falls.\par Plan:  Next Prolia ~ 3/27/2022 followed by ~ 9/27/2022 and ROV here in 6/2022 and then\par more labs.  \par \par Sep 03, 2021        iPhone    at [facility]\par \par PCP:     Dr. Catie Mcnally\par             Gyn: Dr. Caity Reynolds\par             Urol: Dr. Uriel Alex (stones - one episode) \par             Oral surgeon: Dr. Nugent - dental implants\par            .\par            CC: Osteoporosis (prev. Forteo): Fosamax Jan 2014 2/23/2018  CTX s 383  ionized Ca  5.9 (<5.6) [facility]\par                               1/31/2019:  no compression fractures.  Low vit D  \par                                12/18/2018:  BD Lin Spine T -3.0, TH -1.4, FN -2.2, forearm -2.5\par                               11/8/19 hypercalcemia  Ca 10.5 (8.6 - 10.4) [facility]  CTXs 429  Vit D 30 (no PTH)\par                                   4/30/20 [facility]:  PTH 94;  CTXs 598\par                                12/21/2020:  BD Lin  Spine T -3.1\par                   Thyroid nodule  2.3 cm L, FNAB 6/2017 [facility] - benign- low cellularity\par                   Kidney stones  10/2015 [facility] - asymptomatic - saw Dr. Alex\par                    Hypercalciuria   (5/2020 calcium 302 mg/w hrs at [facility])\par \par Visits for osteoporosis (also thyroid nodule, hypercalciuria, hyperparathyroid - mostly normocalcemic, neg sestamibi; Hx low vitamin D)\par Recently received second Pfizer Covid vaccine.\par \par 6/3/2021 labs at [facility] included\par calcium 10.7 (< 10.5)\par PTH 43  (had been 71 in October 2020 when calcium was 9.7)\par \par Treated with Forteo followed by alendronate.   After holiday from alendronate, started on Prolia #1 on Feb 19, 2021\par She is on vit D 1000 iu daily.   Prolia #2 could have been Aug 19.  \par \par Imp: Osteoporosis of spine.\par    Next BD late Dec 2022\par   Prolia #2 can be now after updated vitamin D level.\par   ROV within 5 months\par \par \par \par \par Feb 11, 2021   phone i     [phone] \par \par PCP:     Dr. Catie Mcnally\par             Gyn: Dr. Caity Reynolds\par             Urol: Dr. Uriel Alex (stones - one episode) \par             Oral surgeon: Dr. Nugent - dental implants\par            .\par            CC: Osteoporosis (prev. Forteo): Fosamax Jan 2014 2/23/2018  CTX s 383  ionized Ca  5.9 (<5.6) [facility]\par                               1/31/2019:  no compression fractures.  Low vit D  \par                                12/18/2018:  BD Lin Spine T -3.0, TH -1.4, FN -2.2, forearm -2.5\par                               11/8/19 hypercalcemia  Ca 10.5 (8.6 - 10.4) [facility]  CTXs 429  Vit D 30 (no PTH)\par                   Thyroid nodule  2.3 cm L, FNAB 6/2017 [facility] - benign- low cellularity\par                   Kidney stones  10/2015 [facility] - asymptomatic - saw Dr. Alex\par                    Hypercalciuria\par \par Visits for osteoporosis (also thyroid nodule, hyperparathroid)\par Recenty received second Pfizer Covid vaccine.\par \par Has not required any oral surgery in past few years.\par She used to have low vitamin D, now takes 1000 iu daily\par Spine T -3.1, Z -0.6.   T down from previus T of -3.0\par \par Impression:  Not good candidate for parathyroid surgery.\par Plan:  Consider for Prolia  \par \par \par \par Nov 25, 2020    in person\par \par PCP:     Dr. Catie Mcnally\par             Gyn: Dr. Caity Reynolds\par             Urol: Dr. Uriel Alex (stones - one episode) \par             Oral surgeon: Dr. Nugent - dental implants\par            .\par            CC: Osteoporosis (prev. Forteo): Fosamax Jan 2014 2/23/2018  CTX s 383  ionized Ca  5.9 (<5.6) [facility]\par                               1/31/2019:  no compression fractures.  Low vit D  \par                                12/18/2018:  BD Lin Spine T -3.0, TH -1.4, FN -2.2, forearm -2.5\par                               11/8/19 hypercalcemia  Ca 10.5 (8.6 - 10.4) [facility]  CTXs 429  Vit D 30 (no PTH)\par                   Thyroid nodule  2.3 cm L, FNAB 6/2017 [facility] - benign- low cellularity\par                   Kidney stones  10/2015 [facility] - asymptomatic - saw Dr. Alex\par                    Hypercalciuria\par \par Saw her oral surgeon, Dr. Nugent, in the Spring.   No tooth extractions or dental implants planned in foreseeable future.\par 4/30/20  [facility] included calcium 10.1 PTH 94\par osteocalcin 27\par \par vit D 30\par 1,25 82 (18-72)\par 24 hour urine calcium 255,   creat 0.84 gm\par \par : :\par Constitutional:  Alert, well nourished, healthy appearance, no acute distress \par Eyes:  No proptosis, no lid lag\par Thyroid:\par Pulmonary:  No respiratory distress, no accessory muscle use; normal rate and effort\par Cardiac:  Normal rate\par Vascular: \par Endocrine:  No stigmata of Cushing’s Syndrome\par Musculoskeletal:  Normal gait, no involuntary movements\par Neurology:  No tremors\par Affect/Mood/Psych:  Oriented x 3; normal affect, normal insight/judgement, normal mood \par .\par \par Impression:  Osteoporosis of spine.    No compression or other fractures.\par Hypercalciuria with normocalcemic hyperparathyroidism.  \par \par Impression:  She is eligible for follow up bone density after December 18, 2020 and will likely go for\par Prolia injection after that.  \par \par \par October 20, 2020 labs at [facility] included\par TSH 1.99\par B12 491\par PTH 71 (15-65)\par calcium 9.7  \par \par \par \par \par May 05, 2020  Telephone Visit\par \par PCP:     Dr. ZEB Saleh\par             Gyn: Dr. Caity Reynolds\par             Urol: Dr. Uriel Alex (stones - one episode) \par             Oral surgeon: Dr. Nugent - dental implants\par            .\par            CC: Osteoporosis (prev. Forteo): Fosamax Jan 2014 2/23/2018  CTX s 383  ionized Ca  5.9 (<5.6) [facility]\par                               1/31/2019:  no compression fractures.  Low vit D  \par                                6/27/19 BD [facility] Spine T -3.0, TH -1.4, FN -2.2, forearm -2.5\par                               11/8/19 hypercalcemia  Ca 10.5 (8.6 - 10.4) [facility]  CTXs 429  Vit D 30 (no PTH)\par                   Thyroid nodule  2.3 cm L, FNAB 6/2017 [facility] - benign- low cellularity\par                   Kidney stones  10/2015 [facility] - asymptomatic \par \par No history of fracture.  Spine T score -3.0  12/18/2018\par \par Recent labs include\par 24 hour urine calcium of 255 mg\par 25 OH vit D 30 on ~1000 iu daily.\par PTH 94 (<94)\par \par Labs at [facility] 11/8/2019 included\par calcium 10.5\par \par \par \par Impression:  Trajectory of spine bone density previously improved, but appears to have plateaued and perhaps may be heading back down.  Not an ideal candidate for PTX.   May consider Prolia in the future.  \par \par Impression:  On "holiday" from alendronate.  \par \par \par Nov 12, 2019\par \par PCP:     Dr. ZEB Saleh\par             Gyn: Dr. Caity Reynolds\par             Urol: Dr. Uriel Alex (stones - one episode) \par             Oral surgeon: Dr. Nugent - dental implants\par            .\par            CC: Osteoporosis (prev. Forteo): Fosamax Jan 2014 2/23/2018  CTX s 383  ionized Ca  5.9 (<5.6) [facility]\par                               1/31/2019:  no compression fractures.  Low vit D  \par                                6/27/19 BD [facility] Spine T -3.0, TH -1.4, FN -2.2, forearm -2.5\par                               11/8/19 hypercalcemia  Ca 10.5 (8.6 - 10.4) [facility]  CTXs 429  Vit D 30 (no PTH)\par                   Thyroid nodule  2.3 cm L, FNAB 6/2017 [facility] - benign- low cellularity\par                   Kidney stones  10/2015 [facility] - asymptomatic \par \par \par #  Will ask her to have f/u US thyroid within next year.\par \par #  Osteoporos of spine \par         Has been on "holiday" from alendronate.  \par \par         Impression:  Previously diagnosed with normocalcemic hyperparathryoidism; however, most recent\par           [facility] calcium slightly elevated at 10.5, normal up to 10.4, with normal renal function and\par          25 OH vitmin D level of 30  (but no PTH)\par \par           X-ray spine neg:\par               Lumbosacral Spine.\par \par Frontal, lateral and cone-down projections demonstrate satisfactory alignment of the bony structures. No fracture or vertebral compression can be appreciated. There is no evidence of pedicle destruction. Intervertebral disc spaces are satisfactorily maintained. The sacroiliac joints are symmetric. As compared to the patient's previous study of 9/29/2014, there has been no significant change of an adverse nature.\par \par \par IMPRESSION: No evidence of lumbar compression deformity. No significant change from 9/29/2014.\par \par \par ***Electronically Signed ***\par -----------------------------------------------\par Wojciech Biggs MD              01/31/19 1454\par \par \par IMPRESSION:\par \par No focal site of initial increased and persistent sestamibi tracer localization is evident to specifically suggest uptake in parathyroid adenoma or hyperplasia.\par \par The left thyroid lobe is asymmetrically much larger than the right with much more intense tracer localization.\par \par \par \par \par ***Electronically Signed ***\par -----------------------------------------------\par Donovan Hollis MD              12/06/18 \par \par Imp:  Sestamibi negative.      \par Plan:  24 hour Ca at [facility] with PTH, vit D before next visit in 6 months - will likely decide on\par Prolia at that time; however, will also reconsider evaluation of parathyroid.  \par \par \par \par \par June 27, 2019\par \par PCP:     Dr. ZEB Saleh (to see) ******\par             Gyn: Dr. Caity Reynolds\par             Urol: Dr. Uriel Alex (stones - one episode) \par             Oral surgeon: Dr. Nugent - dental implants\par            .\par            CC: Osteoporosis (prev. Forteo): Fosamax Jan 2014\par \par 79 yo on "holiday" from Fosamax.  \par Lab tests from yesterday at [facility] include \par TSH 1.81\par 25 OH vit D 24\par \par Lumbar spine, L1 to L2, total: 0.647 gm./cm.2.\par \par T-score: -3.0\par \par Z-score: -0.6\par \par Left hip: 0.767 gm./cm.2.\par \par T-score: -1.4 \par \par Z-score: 0.6\par \par Left femoral neck: 0.606\par \par T score: -2.2\par \par Z score: 0.1\par \par  Left forearm, distal third: 0.544\par \par T score: -2.5\par \par Z score: 0.5\par \par On holiday from alendronate.\par \par Last dental implant a few weeks ago.   After a bone graft !\par No other implants on the horizon.\par Plan:  Updated labs late Nov, ROV Dec.\par Likely Prolia next time around  \par \par January 31, 2019\par \par PCP: Dr. Supriya Segovia/Dr. Wojciech Lee\par             Gyn: Dr. Caity Reynolds\par             Urol: Dr. Uriel Alex (stones - one episode) \par             Oral surgeon: Dr. Nugent - dental implants\par            .\par            CC: Osteoporosis (prev. Forteo): Fosamax Jan 2014\par             Last BD: 12/12/2016\par             Asymptomatic kidney stones **\par             Hypercalciuria **\par             (Lyme )\par             History of partial thyroid lobectomy;\par             Thyroid nodules (sono 12/12/2016)\par             -FNAB 6/7/17 [facility] of 2.3 cm L nodule: neg \par             History of I-131 for hyperthyroidism\par             new: DVT LLE 6/2017.\par            " IMPRESSION: No significant change in the appearance of the thyroid since 12/12/2016, including a \par            previously biopsied left lower pole nodule, and an hypoechoic nodule at the posterior aspect of the \par            left upper pole that could be either exophytic or extrathyroidal (parathyroid). In view of the \par            history of hyperparathyroidism, follow-up radionuclide parathyroid scintigraphy can be performed \par            for further more specific evaluation in this regard. \par            ***Electronically Signed *** \par            ----------------------------------------------- \par            Donovan Hollis MD 05/15/18 \par \par \par Recent bone density shows spine T -3.1 and trending down.\par Ultrasound thyroid, no significant change since 12/12/2016 including previously biopsied nodule.  \par Sestamibi parathyroid scan : negative  \par Recent [facility] ionized calcium 5.7 (4.9 - 5.6) **\par PTH 73 (14-64) \par CTXs  653\par osteocalcin 24\par phos 3.3\par TSH 2.88\par vit D 28\par \par No compression fx in 2014.\par \par Imp:  Normocalcemic hyperparathyroid.\par Would benefit from antiresorptive.\par Consider Evenity when available.\par ROV 6 months\par \par \par Previous notes from [employer] appended below.\par \par  June 22, 2018\par            .\par            PCP: Dr. Supriya Segovia/Dr. Wojciech Lee\par             Gyn: Dr. Caity Reynolds\par             Urol: Dr. Uriel Alex (stones - one episode) \par             Oral surgeon: Dr. Nugent - dental implants\par            .\par            CC: Osteoporosis (prev. Forteo): Fosamax Jan 2014\par             Last BD: 12/12/2016\par             Asymptomatic kidney stones **\par             Hypercalciuria **\par             (Lyme )\par             History of partial thyroid lobectomy;\par             Thyroid nodules (sono 12/12/2016)\par             -FNAB 6/7/17 [facility] of 2.3 cm L nodule: neg \par             History of I-131 for hyperthyroidism\par             new: DVT LLE 6/2017.\par            " IMPRESSION: No significant change in the appearance of the thyroid since 12/12/2016, including a \par            previously biopsied left lower pole nodule, and an hypoechoic nodule at the posterior aspect of the \par            left upper pole that could be either exophytic or extrathyroidal (parathyroid). In view of the \par            history of hyperparathyroidism, follow-up radionuclide parathyroid scintigraphy can be performed \par            for further more specific evaluation in this regard. \par            ***Electronically Signed *** \par            ----------------------------------------------- \par            Donovan Hollis MD 05/15/18 \par            .\par            .\par            Impression: Based on above, I will ask her to stop by [facility] for sestamibi parathroid scan and to return after that.\par            can then decide on when and if to start another round of treatment for the osteoporosis. \par            .\par            ==\par            .\par            Feb 2, 2018\par            .\par            PCP: Dr. Supriya Segovia/Dr. Wojciech Lee\par             Gyn: Dr. Caity Reynolds\par             Urol: Dr. Uriel Alex (stones - one episode) \par             Oral surgeon: Dr. Nugent - dental implants\par            .\par            CC: Osteoporosis (prev. Forteo): Fosamax Jan 2014\par             Last BD: 12/12/2016\par             Asymptomatic kidney stones **\par             Hypercalciuria **\par             (Lyme )\par             History of partial thyroid lobectomy;\par             Thyroid nodules (sono 12/12/2016)\par             -FNAB 6/7/17 [facility] of 2.3 cm L nodule: neg \par             History of I-131 for hyperthyroidism\par             new: DVT LLE 6/2017.\par            .\par            Stopped a/c two weeks ago.\par            Stopped Fosamax end of Dec 2017 after 4 years. \par            . \par            Labs ([facility]) from \par            1/23/2018\par            TSH 2.66\par            calcium 10.3\par            ionized calcium 5.9 (4.8 - 5.6) ***\par            phos 3.4\par            PTH 64 (14-64) ***\par            25 OH vit D\par            CTXs 383 ****\par            .\par            Impression: Took two years of Forteo, 4 years alendronate, started "holiday" recently, has dental imiplants, eligible for BD Dec 2018 and will likely benefit from:\par            -sestamibi parathyroid scan\par            -Prolia after next BD\par             - need fu u/s thyroid around April and RDV May. \par            .\par            ==\par            .\par            Sept 15, 2017\par            .\par            PCP: Dr. Supriya Segovia\par             Gyn: Dr. Caity Reynolds\par             Urol: Dr. Uriel Alex (stones - one episode) \par            .\par            CC: Osteoporosis (prev. Forteo): Fosamax Jan 2014\par             Last BD: 12/12/2016\par             Asymptomatic kidney stones **\par             Hypercalciuria **\par             (Lyme )\par             History of partial thyroid lobectomy;\par             Thyroid nodules (sono 12/12/2016)\par             History of I-131 for hyperthyroidism\par             new: DVT LLE 6/2017.\par            .\par            Went for FNAB thyroid nodule at [facility] - cytology read as benign\par            .\par            Bone density stable and December 2017 she will have been on Fosamax for 4 years, so will advise "holiday".\par            .\par            Now on Xaralto because of L DVT:\par            .\par            .\par            "6/23/2017\par            IMPRESSION: Positive DVT study with thrombus extending from the posterior tibial vein into the popliteal vein."\par            .\par            My impression: \par            Labs at [facility] on \par            9/11/2017 included \par            calcium 10.2 (8.6-10.4)\par            phos 3.2\par            PTH 73 (14-64)\par            TSH 2.55 \par            25 OH vit D 28\par            CTXs 282 (on alendronate) \par            .\par            Impression: \par            FNAB thyroid at [facility] (after first at [facility] "[facility]") reassuring.\par            .\par             Normocalcemic hyperparathyroidism.\par            Bone density stable.\par            Can go on "holiday" from alendronate.\par            Monitor calcium/PTH\par            ROV February. \par            Next BD ~Dec 2019\par            .\par            ==\par            .\par            May 5, 2017\par            .\par            PCP: Dr. Supriya Segovia\par             Gyn: Dr. Caity Reynolds\par             Urol: Dr. Uriel Alex (stones - one episode) \par            .\par            CC: Osteoporosis (prev. Forteo): Fosamax Jan 2014\par             Last BD: 12/12/2016\par             Asymptomatic kidney stones **\par             Hypercalciuria **\par             (Lyme )\par             History of partial thyroid lobectomy;\par             Thyroid nodules (sono 12/12/2016)\par             History of I-131 for hyperthyroidism\par             .\par            .\par            Imp/Plan:\par            .\par            Biopsy of dominant thyroid nodule at [facility] was not successful.\par            Went to [facility] for sonogram and Dr. Phipps also advised FNAB so will ask her to try there. \par            .\par            Bone density seems to have improved on Fosamax. End of this year will be four years and a reasonable time to consider a "holiday" if she and the rest of her team agree.\par            .\par            Monitor vit D and PTH level.\par            ROV in September. \par            .\par            .\par            .\par            ==\par            .\par            March 3, 2017\par            .\par            PCP: Dr. Supriya Segovia\par             Gyn: Dr. Caity Reynolds\par            .\par            CC: Osteoporosis (prev. Forteo): Fosamax Jan 2014\par             Last BD: 12/12/2016\par             Asymptomatic kidney stones \par             Hypercalciuria \par             (Lyme )\par             History of partial thyroid lobectomy;\par             Thyroid nodules (sono 12/12/2016)\par             History of I-131 for hyperthyroidism\par            .\par            Visited [facility] and enjoyed it. \par            .\par            Labs ([facility])\par            24 hour urine collection \par            1 g creatinine \par            calcium 287 (<250)\par            .\par            creatinine 0.81\par            uric acid 4.2\par            serum calcium 10.4 (8.6-10.4) \par            PTH 60 \par            25 OH vit D 27 \par            CTXs 278\par            .\par            Bone density stable\par            Teeth stable\par            Thyroid sono advises FNAB of nodule (she is s/p partial lobectomy in college and I-131 for hyperthyroidism several years ago)\par            .\par            Impression: Because of evidence of possible normocalcemic hyperparathyroidism, she went for ultrasound of thyroid/parathyroid and nodules detected with recommendation for FNAB \par            .\par            Osteoporosis currently stable. \par            .\par            Plan: ROV in two months (after FNAB of thyroid nodule)\par            .\par            ==\par            .;\par            October 7, 2016\par            .\par            PCP: Dr. Supriya Segovia\par             Gyn: Dr. Caity Reynolds\par            .\par            CC: Osteoporosis (prev. Forteo): Fosamax Jan 2014\par             (Lyme last \par            .\par            Dental issues are quiet\par            Remains on alendronate weekly.\par            .\par            Blood tests ([facility]) from \par            3/18/2016 inclued\par            BS 87 mg/dl\par            calcium 9.1\par            phos 3.1\par            TSH 2.28 \par            Vit B12 367\par            PTH 78 (14-65) \par            creat 0.79\par            vit D 35\par            ionized calcium - normal \par            .\par            Did not yet go for BD.\par            For problem with urination saw Uriel at [facility]. \par            sono showed bilateral tiny kidney stones. \par            stopped calcium supp after that. \par            (mother had stones)\par            .\par            Plan: sono thyroid\par            repeat PTH\par            24 hour urine ca/stone former\par            bone density\par            ROV Feb\par            continue Fosamax\par            .\par            ==\par            .\par            March 18, 2016\par            .\par            PCP: Dr. Supriya Segovia\par             Gyn: Dr. Caity Reynolds\par            .\par            CC: Osteoporosis (prev. Forteo): Fosamax Jan 2014\par             (Lyme last \par            .\par            Had bone grafting lower part of her mouth - last September.\par            .\par            Plan: Updated labs now.\par            ROV late October after BD\par            .\par            ==\par            .\par            June 5, 2015\par            .\par            PCP: Dr. Supriya Segovia\par             Gyn: Dr. Caity Reynolds\par            .\par            CC: Osteoporosis (prev. Forteo): Fosamax Jan 2014\par             (Lyme last \par            .\par            [facility]\par            .\par            Recent [facility] labs show 25 OH vit 41\par            CTXs 148\par            ionized calcium 5.7 (4.8-5.6) \par            .\par            Impression: Took Forteo for two years and has been on Fosamax since Jan 2014.\par            .\par            Impression: Dental issues are quiet. Serial bone densities show considerable improvement over time, especially in her spine. Last BD Sept 2014 showed spine T -2.6 Z -0.3.\par            .\par            Plan: Continue Fosamax for 3-5 years.\par            .Next BD ~ Oct 2016.\par            ROV here in March.\par            .\par            .\par            ===\par            October 28, 2014\par            PCP: Dr. Supriya Segovia\par            CC: Osteoporosis (prev. Forteo)\par            .\par            Treated for Lyme over the summer.\par            On Fosamax since Jan 2014.\par            April 2014 CTXs 192 (10/2013 CTXs 341).\par             vit D 36\par            .\par            Much of improvement in bone density was likely from the Forteo and she is now holding her own with Fosamax. \par            .\par            Dental implant fine.\par            .\par            Plan: Continue the Fosamax for up to 3 years and then consider Prolia.\par            ROV six months. \par            .\par            ===\par            April 8, 2014\par            PCP: Dr. Supriya Segovia\par            CC: Osteoporosois\par            .\par            Took Forteo for two years.\par            Because of dental issues preferred to take Calcitonin after that.\par            Switched to Fosamax around Januiary 2014.\par            Notes no symptoms from Fosamax.\par            Her densist, Dr. Nugent, is pleased with her progress.\par            She goes for teeth cleaning every 3 months.\par            .\par            Impression: Markers of bone turnover have decreased on Fosamax, predicting absorption and effectiveness.\par            .\par            Plan: Same Rx. Follow up BD two years after last. \par            .\par            .\par            ====\par            October 28, 2013\par            PCP: Dr. Supriya Segovia\par            CC: Osteoporosis (occasion hypercalcemia); previous Forteo\par            .\par            Blood tests at [facility] in July showed calcium 10.3, PTH 47\par            CTXs 342. \par            BSA 18.1 (14.2 - 42.7)\par            .\par            Labs from today still pending.\par            Dr. Nugent said: "Dr. Hellerman can do whatever he wants now."\par            Has a 3 month supply of calcitonin. \par            Plan: After finish\par            .\par            ========\par            March 26, 2013 Returns for osteoporosis. Has not yet received approval from Dr. Nugent to have more aggressive treatment than calcitonin (such as Fosamax, Reclast, or Prolia). She will be seeing Dr. Nugent within 3 months and will get his approval. In the meatime, she will stay on the calcitonin nasal spray. \par            .\par            Had blood tests last week at [facility] in [place]. \par  \par  ROS:  \par  \par

## 2022-02-02 NOTE — ASSESSMENT
[FreeTextEntry1] : As above.\par  She will go for updated vit D, BMP in next few days so that she can go for Prolia #2

## 2022-02-02 NOTE — HISTORY OF PRESENT ILLNESS
[Home] : at home, [unfilled] , at the time of the visit. [Medical Office: ([facility])___] : at the medical office located in  [Verbal consent obtained from patient] : the patient, [unfilled] [FreeTextEntry1] : Sep 03, 2021        iPhone    at [place]\par \par PCP:     Dr. Catie Mcnally\par             Gyn: Dr. Caity Reynolds\par             Urol: Dr. Uriel Alex (stones - one episode) \par             Oral surgeon: Dr. Nugent - dental implants\par            .\par            CC: Osteoporosis (prev. Forteo): Fosamax Jan 2014 2/23/2018  CTX s 383  ionized Ca  5.9 (<5.6) [facility]\par                               1/31/2019:  no compression fractures.  Low vit D  \par                                12/18/2018:  BD Lin Spine T -3.0, TH -1.4, FN -2.2, forearm -2.5\par                               11/8/19 hypercalcemia  Ca 10.5 (8.6 - 10.4) [facility]  CTXs 429  Vit D 30 (no PTH)\par                                   4/30/20 [facility]:  PTH 94;  CTXs 598\par                                12/21/2020:  BD [facility]  Spine T -3.1\par                   Thyroid nodule  2.3 cm L, FNAB 6/2017 [facility] - benign- low cellularity\par                   Kidney stones  10/2015 [facility] Kidney - asymptomatic - saw Dr. Alex\par                    Hypercalciuria   (5/2020 calcium 302 mg/w hrs at [facility])\par \par Visits for osteoporosis (also thyroid nodule, hypercalciuria, hyperparathyroid - mostly normocalcemic, neg sestamibi; Hx low vitamin D)\par Recently received second Pfizer Covid vaccine.\par \par 6/3/2021 labs at [facility] included\par calcium 10.7 (< 10.5)\par PTH 43  (had been 71 in October 2020 when calcium was 9.7)\par \par 12/21/20 LS T -3.1   so next BD after 12/21/2022\par \par IImp:  Has been on Prolia for one year.\par Last Prolia 9/27/21 and so next Prolia ~March 27, 2022 and\par ROV here in June 2022   \par \par Treated with Forteo followed by alendronate.   After holiday from alendronate, started on Prolia #1 on Feb 19, 2021\par She is on vit D 1000 iu daily.   Prolia #2 could have been Aug 19.  \par \par Imp: Osteoporosis of spine.\par    Next BD late Dec 2022\par   Prolia #2 can be now after updated vitamin D level.\par   ROV within 5 months\par \par \par \par \par Feb 11, 2021   phone i     [phone] \par \par PCP:     Dr. Catie Mcnally\par             Gyn: Dr. Caity Reynolds\par             Urol: Dr. Uriel Alex (stones - one episode) \par             Oral surgeon: Dr. Nugent - dental implants\par            .\par            CC: Osteoporosis (prev. Forteo): Fosamax Jan 2014 2/23/2018  CTX s 383  ionized Ca  5.9 (<5.6) [facility]\par                               1/31/2019:  no compression fractures.  Low vit D  \par                                12/18/2018:  BD Lin Spine T -3.0, TH -1.4, FN -2.2, forearm -2.5\par                               11/8/19 hypercalcemia  Ca 10.5 (8.6 - 10.4) [facility]  CTXs 429  Vit D 30 (no PTH)\par                   Thyroid nodule  2.3 cm L, FNAB 6/2017 [facility] - benign- low cellularity\par                   Kidney stones  10/2015 US Kidney - asymptomatic - saw Dr. Alex\par                    Hypercalciuria\par \par Visits for osteoporosis (also thyroid nodule, hyperparathroid)\par Recenty received second Pfizer Covid vaccine.\par \par Has not required any oral surgery in past few years.\par She used to have low vitamin D, now takes 1000 iu daily\par Spine T -3.1, Z -0.6.   T down from previus T of -3.0\par \par Impression:  Not good candidate for parathyroid surgery.\par Plan:  Consider for Prolia  \par \par \par \par Nov 25, 2020    in person\par \par PCP:     Dr. Catie Mcnally\par             Gyn: Dr. Caity Reynolds\par             Urol: Dr. Uriel Alex (stones - one episode) \par             Oral surgeon: Dr. Nugent - dental implants\par            .\par            CC: Osteoporosis (prev. Forteo): Fosamax Jan 2014 2/23/2018  CTX s 383  ionized Ca  5.9 (<5.6) [facility]\par                               1/31/2019:  no compression fractures.  Low vit D  \par                                12/18/2018:  BD Lin Spine T -3.0, TH -1.4, FN -2.2, forearm -2.5\par                               11/8/19 hypercalcemia  Ca 10.5 (8.6 - 10.4) [facility]  CTXs 429  Vit D 30 (no PTH)\par                   Thyroid nodule  2.3 cm L, FNAB 6/2017 [facility] - benign- low cellularity\par                   Kidney stones  10/2015  Kidney - asymptomatic - saw Dr. Alex\par                    Hypercalciuria\par \par Saw her oral surgeon, Dr. Nugent, in the Spring.   No tooth extractions or dental implants planned in foreseeable future.\par 4/30/20  [facility] included calcium 10.1 PTH 94\par osteocalcin 27\par \par vit D 30\par 1,25 82 (18-72)\par 24 hour urine calcium 255,   creat 0.84 gm\par \par : :\par Constitutional:  Alert, well nourished, healthy appearance, no acute distress \par Eyes:  No proptosis, no lid lag\par Thyroid:\par Pulmonary:  No respiratory distress, no accessory muscle use; normal rate and effort\par Cardiac:  Normal rate\par Vascular: \par Endocrine:  No stigmata of Cushing’s Syndrome\par Musculoskeletal:  Normal gait, no involuntary movements\par Neurology:  No tremors\par Affect/Mood/Psych:  Oriented x 3; normal affect, normal insight/judgement, normal mood \par .\par \par Impression:  Osteoporosis of spine.    No compression or other fractures.\par Hypercalciuria with normocalcemic hyperparathyroidism.  \par \par Impression:  She is eligible for follow up bone density after December 18, 2020 and will likely go for\par Prolia injection after that.  \par \par \par October 20, 2020 labs at [facility] included\par TSH 1.99\par B12 491\par PTH 71 (15-65)\par calcium 9.7  \par \par \par \par \par May 05, 2020  Telephone Visit\par \par PCP:     Dr. ZEB Saleh\par             Gyn: Dr. Caity Reynolds\par             Urol: Dr. Uriel Alex (stones - one episode) \par             Oral surgeon: Dr. Nugent - dental implants\par            .\par            CC: Osteoporosis (prev. Forteo): Fosamax Jan 2014 2/23/2018  CTX s 383  ionized Ca  5.9 (<5.6) [facility]\par                               1/31/2019:  no compression fractures.  Low vit D  \par                                6/27/19 BD Lin Spine T -3.0, TH -1.4, FN -2.2, forearm -2.5\par                               11/8/19 hypercalcemia  Ca 10.5 (8.6 - 10.4) [facility]  CTXs 429  Vit D 30 (no PTH)\par                   Thyroid nodule  2.3 cm L, FNAB 6/2017 [facility] - benign- low cellularity\par                   Kidney stones  10/2015 US Kidney - asymptomatic \par \par No history of fracture.  Spine T score -3.0  12/18/2018\par \par Recent labs include\par 24 hour urine calcium of 255 mg\par 25 OH vit D 30 on ~1000 iu daily.\par PTH 94 (<94)\par \par Labs at [facility] 11/8/2019 included\par calcium 10.5\par \par \par \par Impression:  Trajectory of spine bone density previously improved, but appears to have plateaued and perhaps may be heading back down.  Not an ideal candidate for PTX.   May consider Prolia in the future.  \par \par Impression:  On "holiday" from alendronate.  \par \par \par Nov 12, 2019\par \par PCP:     Dr. ZEB Saleh\par             Gyn: Dr. Caity Reynolds\par             Urol: Dr. Uriel Alex (stones - one episode) \par             Oral surgeon: Dr. Nugent - dental implants\par            .\par            CC: Osteoporosis (prev. Forteo): Fosamax Jan 2014 2/23/2018  CTX s 383  ionized Ca  5.9 (<5.6) [facility]\par                               1/31/2019:  no compression fractures.  Low vit D  \par                                6/27/19 BD Lin Spine T -3.0, TH -1.4, FN -2.2, forearm -2.5\par                               11/8/19 hypercalcemia  Ca 10.5 (8.6 - 10.4) [facility]  CTXs 429  Vit D 30 (no PTH)\par                   Thyroid nodule  2.3 cm L, FNAB 6/2017 [facility] - benign- low cellularity\par                   Kidney stones  10/2015 US Kidney - asymptomatic \par \par \par #  Will ask her to have f/u US thyroid within next year.\par \par #  Osteoporos of spine \par         Has been on "holiday" from alendronate.  \par \par         Impression:  Previously diagnosed with normocalcemic hyperparathryoidism; however, most recent\par           [facility] calcium slightly elevated at 10.5, normal up to 10.4, with normal renal function and\par          25 OH vitmin D level of 30  (but no PTH)\par \par           X-ray spine neg:\par               Lumbosacral Spine.\par \par Frontal, lateral and cone-down projections demonstrate satisfactory alignment of the bony structures. No fracture or vertebral compression can be appreciated. There is no evidence of pedicle destruction. Intervertebral disc spaces are satisfactorily maintained. The sacroiliac joints are symmetric. As compared to the patient's previous study of 9/29/2014, there has been no significant change of an adverse nature.\par \par \par IMPRESSION: No evidence of lumbar compression deformity. No significant change from 9/29/2014.\par \par \par ***Electronically Signed ***\par -----------------------------------------------\par Wojciech Biggs MD              01/31/19 1454\par \par \par IMPRESSION:\par \par No focal site of initial increased and persistent sestamibi tracer localization is evident to specifically suggest uptake in parathyroid adenoma or hyperplasia.\par \par The left thyroid lobe is asymmetrically much larger than the right with much more intense tracer localization.\par \par \par \par \par ***Electronically Signed ***\par -----------------------------------------------\par Donovan Hollis MD              12/06/18 \par \par Imp:  Sestamibi negative.      \par Plan:  24 hour Ca at [facility] with PTH, vit D before next visit in 6 months - will likely decide on\par Prolia at that time; however, will also reconsider evaluation of parathyroid.  \par \par \par \par \par June 27, 2019\par \par PCP:     Dr. ZEB Saleh (to see) ******\par             Gyn: Dr. Caity Reynolds\par             Urol: Dr. Uriel Alex (stones - one episode) \par             Oral surgeon: Dr. Nugent - dental implants\par            .\par            CC: Osteoporosis (prev. Forteo): Fosamax Jan 2014\par \par 77 yo on "holiday" from Fosamax.  \par Lab tests from yesterday at [facility] include \par TSH 1.81\par 25 OH vit D 24\par \par Lumbar spine, L1 to L2, total: 0.647 gm./cm.2.\par \par T-score: -3.0\par \par Z-score: -0.6\par \par Left hip: 0.767 gm./cm.2.\par \par T-score: -1.4 \par \par Z-score: 0.6\par \par Left femoral neck: 0.606\par \par T score: -2.2\par \par Z score: 0.1\par \par  Left forearm, distal third: 0.544\par \par T score: -2.5\par \par Z score: 0.5\par \par On holiday from alendronate.\par \par Last dental implant a few weeks ago.   After a bone graft !\par No other implants on the horizon.\par Plan:  Updated labs late Nov, ROV Dec.\par Likely Prolia next time around  \par \par January 31, 2019\par \par PCP: Dr. Supriya Segovia/Dr. Wojciech Lee\par             Gyn: Dr. Caity Reynolds\par             Urol: Dr. Uriel Alex (stones - one episode) \par             Oral surgeon: Dr. Nugent - dental implants\par            .\par            CC: Osteoporosis (prev. Forteo): Fosamax Jan 2014\par             Last BD: 12/12/2016\par             Asymptomatic kidney stones **\par             Hypercalciuria **\par             (Lyme )\par             History of partial thyroid lobectomy;\par             Thyroid nodules (sono 12/12/2016)\par             -FNAB 6/7/17 [facility] of 2.3 cm L nodule: neg \par             History of I-131 for hyperthyroidism\par             new: DVT LLE 6/2017.\par            " IMPRESSION: No significant change in the appearance of the thyroid since 12/12/2016, including a \par            previously biopsied left lower pole nodule, and an hypoechoic nodule at the posterior aspect of the \par            left upper pole that could be either exophytic or extrathyroidal (parathyroid). In view of the \par            history of hyperparathyroidism, follow-up radionuclide parathyroid scintigraphy can be performed \par            for further more specific evaluation in this regard. \par            ***Electronically Signed *** \par            ----------------------------------------------- \par            Donovan Hollis MD 05/15/18 \par \par \par Recent bone density shows spine T -3.1 and trending down.\par Ultrasound thyroid, no significant change since 12/12/2016 including previously biopsied nodule.  \par Sestamibi parathyroid scan : negative  \par Recent [facility] ionized calcium 5.7 (4.9 - 5.6) **\par PTH 73 (14-64) \par CTXs  653\par osteocalcin 24\par phos 3.3\par TSH 2.88\par vit D 28\par \par No compression fx in 2014.\par \par Imp:  Normocalcemic hyperparathyroid.\par Would benefit from antiresorptive.\par Consider Evenity when available.\par ROV 6 months\par \par \par Previous notes from [employer] appended below.\par \par  June 22, 2018\par            .\par            PCP: Dr. Supriya Segovia/Dr. Wojciech Lee\par             Gyn: Dr. Caity Reynolds\par             Urol: Dr. Uriel Alex (stones - one episode) \par             Oral surgeon: Dr. Nugent - dental implants\par            .\par            CC: Osteoporosis (prev. Forteo): Fosamax Jan 2014\par             Last BD: 12/12/2016\par             Asymptomatic kidney stones **\par             Hypercalciuria **\par             (Lyme )\par             History of partial thyroid lobectomy;\par             Thyroid nodules (sono 12/12/2016)\par             -FNAB 6/7/17 [facility] of 2.3 cm L nodule: neg \par             History of I-131 for hyperthyroidism\par             new: DVT LLE 6/2017.\par            " IMPRESSION: No significant change in the appearance of the thyroid since 12/12/2016, including a \par            previously biopsied left lower pole nodule, and an hypoechoic nodule at the posterior aspect of the \par            left upper pole that could be either exophytic or extrathyroidal (parathyroid). In view of the \par            history of hyperparathyroidism, follow-up radionuclide parathyroid scintigraphy can be performed \par            for further more specific evaluation in this regard. \par            ***Electronically Signed *** \par            ----------------------------------------------- \par            Donovan Hollis MD 05/15/18 \par            .\par            .\par            Impression: Based on above, I will ask her to stop by [facility] for sestamibi parathroid scan and to return after that.\par            can then decide on when and if to start another round of treatment for the osteoporosis. \par            .\par            ==\par            .\par            Feb 2, 2018\par            .\par            PCP: Dr. Supriya Segovia/Dr. Wojciech Lee\par             Gyn: Dr. Caity Reynolds\par             Urol: Dr. Uriel Alex (stones - one episode) \par             Oral surgeon: Dr. Nugent - dental implants\par            .\par            CC: Osteoporosis (prev. Forteo): Fosamax Jan 2014\par             Last BD: 12/12/2016\par             Asymptomatic kidney stones **\par             Hypercalciuria **\par             (Lyme )\par             History of partial thyroid lobectomy;\par             Thyroid nodules (sono 12/12/2016)\par             -FNAB 6/7/17 [facility] of 2.3 cm L nodule: neg \par             History of I-131 for hyperthyroidism\par             new: DVT LLE 6/2017.\par            .\par            Stopped a/c two weeks ago.\par            Stopped Fosamax end of Dec 2017 after 4 years. \par            . \par            Labs ([facility]) from \par            1/23/2018\par            TSH 2.66\par            calcium 10.3\par            ionized calcium 5.9 (4.8 - 5.6) ***\par            phos 3.4\par            PTH 64 (14-64) ***\par            25 OH vit D\par            CTXs 383 ****\par            .\par            Impression: Took two years of Forteo, 4 years alendronate, started "holiday" recently, has dental imiplants, eligible for BD Dec 2018 and will likely benefit from:\par            -sestamibi parathyroid scan\par            -Prolia after next BD\par             - need fu u/s thyroid around April and RDV May. \par            .\par            ==\par            .\par            Sept 15, 2017\par            .\par            PCP: Dr. Supriya Segovia\par             Gyn: Dr. Caity Reynolds\par             Urol: Dr. Uriel Alex (stones - one episode) \par            .\par            CC: Osteoporosis (prev. Forteo): Fosamax Jan 2014\par             Last BD: 12/12/2016\par             Asymptomatic kidney stones **\par             Hypercalciuria **\par             (Lyme )\par             History of partial thyroid lobectomy;\par             Thyroid nodules (sono 12/12/2016)\par             History of I-131 for hyperthyroidism\par             new: DVT LLE 6/2017.\par            .\par            Went for FNAB thyroid nodule at [facility] - cytology read as benign\par            .\par            Bone density stable and December 2017 she will have been on Fosamax for 4 years, so will advise "holiday".\par            .\par            Now on Xaralto because of L DVT:\par            .\par            .\par            "6/23/2017\par            IMPRESSION: Positive DVT study with thrombus extending from the posterior tibial vein into the popliteal vein."\par            .\par            My impression: \par            Labs at [facility] on \par            9/11/2017 included \par            calcium 10.2 (8.6-10.4)\par            phos 3.2\par            PTH 73 (14-64)\par            TSH 2.55 \par            25 OH vit D 28\par            CTXs 282 (on alendronate) \par            .\par            Impression: \par            FNAB thyroid at [facility] (after first at [facility] "[facility]") reassuring.\par            .\par             Normocalcemic hyperparathyroidism.\par            Bone density stable.\par            Can go on "holiday" from alendronate.\par            Monitor calcium/PTH\par            ROV February. \par            Next BD ~Dec 2019\par            .\par            ==\par            .\par            May 5, 2017\par            .\par            PCP: Dr. Supriya Segovia\par             Gyn: Dr. Caity Renyolds\par             Urol: Dr. Uriel Alex (stones - one episode) \par            .\par            CC: Osteoporosis (prev. Forteo): Fosamax Jan 2014\par             Last BD: 12/12/2016\par             Asymptomatic kidney stones **\par             Hypercalciuria **\par             (Lyme )\par             History of partial thyroid lobectomy;\par             Thyroid nodules (sono 12/12/2016)\par             History of I-131 for hyperthyroidism\par             .\par            .\par            Imp/Plan:\par            .\par            Biopsy of dominant thyroid nodule at [facility] was not successful.\par            Went to [facility] for sonogram and Dr. Phipps also advised FNAB so will ask her to try there. \par            .\par            Bone density seems to have improved on Fosamax. End of this year will be four years and a reasonable time to consider a "holiday" if she and the rest of her team agree.\par            .\par            Monitor vit D and PTH level.\par            ROV in September. \par            .\par            .\par            .\par            ==\par            .\par            March 3, 2017\par            .\par            PCP: Dr. Supriya Segovia\par             Gyn: Dr. Caity Reynolds\par            .\par            CC: Osteoporosis (prev. Forteo): Fosamax Jan 2014\par             Last BD: 12/12/2016\par             Asymptomatic kidney stones \par             Hypercalciuria \par             (Lyme )\par             History of partial thyroid lobectomy;\par             Thyroid nodules (sono 12/12/2016)\par             History of I-131 for hyperthyroidism\par            .\par            Visited [facility] and enjoyed it. \par            .\par            Labs ([facility])\par            24 hour urine collection \par            1 g creatinine \par            calcium 287 (<250)\par            .\par            creatinine 0.81\par            uric acid 4.2\par            serum calcium 10.4 (8.6-10.4) \par            PTH 60 \par            25 OH vit D 27 \par            CTXs 278\par            .\par            Bone density stable\par            Teeth stable\par            Thyroid sono advises FNAB of nodule (she is s/p partial lobectomy in college and I-131 for hyperthyroidism several years ago)\par            .\par            Impression: Because of evidence of possible normocalcemic hyperparathyroidism, she went for ultrasound of thyroid/parathyroid and nodules detected with recommendation for FNAB \par            .\par            Osteoporosis currently stable. \par            .\par            Plan: ROV in two months (after FNAB of thyroid nodule)\par            .\par            ==\par            .;\par            October 7, 2016\par            .\par            PCP: Dr. Supriya Segovia\par             Gyn: Dr. Caity Reynolds\par            .\par            CC: Osteoporosis (prev. Forteo): Fosamax Jan 2014\par             (Lyme last \par            .\par            Dental issues are quiet\par            Remains on alendronate weekly.\par            .\par            Blood tests ([facility]) from \par            3/18/2016 inclued\par            BS 87 mg/dl\par            calcium 9.1\par            phos 3.1\par            TSH 2.28 \par            Vit B12 367\par            PTH 78 (14-65) \par            creat 0.79\par            vit D 35\par            ionized calcium - normal \par            .\par            Did not yet go for BD.\par            For problem with urination saw Uriel at [facility]. \par            sono showed bilateral tiny kidney stones. \par            stopped calcium supp after that. \par            (mother had stones)\par            .\par            Plan: sono thyroid\par            repeat PTH\par            24 hour urine ca/stone former\par            bone density\par            ROV Feb\par            continue Fosamax\par            .\par            ==\par            .\par            March 18, 2016\par            .\par            PCP: Dr. Supriya Segovia\par             Gyn: Dr. Caity Reynolds\par            .\par            CC: Osteoporosis (prev. Forteo): Fosamax Jan 2014\par             (Lyme last \par            .\par            Had bone grafting lower part of her mouth - last September.\par            .\par            Plan: Updated labs now.\par            ROV late October after BD\par            .\par            ==\par            .\par            June 5, 2015\par            .\par            PCP: Dr. Supriya Segovia\par             Gyn: Dr. Caity Reynolds\par            .\par            CC: Osteoporosis (prev. Forteo): Fosamax Jan 2014\par             (Lyme last \par            .\par            [facility]\par            .\par            Recent [facility] labs show 25 OH vit 41\par            CTXs 148\par            ionized calcium 5.7 (4.8-5.6) \par            .\par            Impression: Took Forteo for two years and has been on Fosamax since Jan 2014.\par            .\par            Impression: Dental issues are quiet. Serial bone densities show considerable improvement over time, especially in her spine. Last BD Sept 2014 showed spine T -2.6 Z -0.3.\par            .\par            Plan: Continue Fosamax for 3-5 years.\par            .Next BD ~ Oct 2016.\par            ROV here in March.\par            .\par            .\par            ===\par            October 28, 2014\par            PCP: Dr. Supriya Segovia\par            CC: Osteoporosis (prev. Forteo)\par            .\par            Treated for Lyme over the summer.\par            On Fosamax since Jan 2014.\par            April 2014 CTXs 192 (10/2013 CTXs 341).\par             vit D 36\par            .\par            Much of improvement in bone density was likely from the Forteo and she is now holding her own with Fosamax. \par            .\par            Dental implant fine.\par            .\par            Plan: Continue the Fosamax for up to 3 years and then consider Prolia.\par            ROV six months. \par            .\par            ===\par            April 8, 2014\par            PCP: Dr. Supriya Segovia\par            CC: Osteoporosois\par            .\par            Took Forteo for two years.\par            Because of dental issues preferred to take Calcitonin after that.\par            Switched to Fosamax around Januiary 2014.\par            Notes no symptoms from Fosamax.\par            Her densist, Dr. Nugent, is pleased with her progress.\par            She goes for teeth cleaning every 3 months.\par            .\par            Impression: Markers of bone turnover have decreased on Fosamax, predicting absorption and effectiveness.\par            .\par            Plan: Same Rx. Follow up BD two years after last. \par            .\par            .\par            ====\par            October 28, 2013\par            PCP: Dr. Supriya Segovia\par            CC: Osteoporosis (occasion hypercalcemia); previous Forteo\par            .\par            Blood tests at [facility] in July showed calcium 10.3, PTH 47\par            CTXs 342. \par            BSA 18.1 (14.2 - 42.7)\par            .\par            Labs from today still pending.\par            Dr. Nugent said: "Dr. Hellerman can do whatever he wants now."\par            Has a 3 month supply of calcitonin. \par            Plan: After finish\par            .\par            ========\par            March 26, 2013 Returns for osteoporosis. Has not yet received approval from Dr. Nugent to have more aggressive treatment than calcitonin (such as Fosamax, Reclast, or Prolia). She will be seeing Dr. Nugent within 3 months and will get his approval. In the meatime, she will stay on the calcitonin nasal spray. \par            .\par            Had blood tests last week at [facility] in [place]. \par  \par  ROS:  \par  \par

## 2022-03-28 ENCOUNTER — RESULT REVIEW (OUTPATIENT)
Age: 82
End: 2022-03-28

## 2022-06-08 ENCOUNTER — APPOINTMENT (OUTPATIENT)
Dept: ENDOCRINOLOGY | Facility: CLINIC | Age: 82
End: 2022-06-08
Payer: MEDICARE

## 2022-06-08 PROCEDURE — 99214 OFFICE O/P EST MOD 30 MIN: CPT | Mod: 95

## 2022-06-09 NOTE — HISTORY OF PRESENT ILLNESS
[Home] : at home, [unfilled] , at the time of the visit. [Medical Office: ([facility])___] : at the medical office located in  [FreeTextEntry1] : Jun 08, 2022      iPhone\par \par PCP:     Dr. Catie Mcnally\par             Gyn: Dr. Caity Reynolds\par             Urol: Dr. Uriel Alex (stones - one episode) \par             Oral surgeon: Dr. Nugent - dental implants\par            .\par            CC: Osteoporosis (prev. Forteo): Fosamax Jan 2014 2/23/2018  CTX s 383  ionized Ca  5.9 (<5.6) [facility]\par                               1/31/2019:  no compression fractures.  Low vit D  \par                                12/18/2018:  BD Lin Spine T -3.0, TH -1.4, FN -2.2, forearm -2.5\par                               11/8/19 hypercalcemia  Ca 10.5 (8.6 - 10.4) [facility]  CTXs 429  Vit D 30 (no PTH)\par                                   4/30/20 [facility]:  PTH 94;  CTXs 598\par                                12/21/2020:  BD Lin  Spine T -3.1\par                   Thyroid nodule  2.3 cm L, FNAB 6/2017 [facility] - benign- low cellularity\par                   Kidney stones  10/2015 US Kidney - asymptomatic - saw Dr. Alex\par                    Hypercalciuria   (5/2020 calcium 302 mg/w hrs at [facility])\par \par \par Last Prolia 9/27/2021 and so next likely 3/27/2022 - Rx sent and she will also call\par Getting calcium in diet and taking 1000 iu vit D daily  \par \par Moste recent Prolia 3/28 and next is 9/28/22\par \par After htat will be 3/2023 so next visit around January 2023  \par \par \par \par Feb 02, 2022       iPhone (she resides at [facility])\par \par PCP:     Dr. Catie Mcnally\par             Gyn: Dr. Caity Reynolds\par             Urol: Dr. Uriel Alex (stones - one episode) \par             Oral surgeon: Dr. Nugent - dental implants\par            .\par            CC: Osteoporosis (prev. Forteo): Fosamax Jan 2014 2/23/2018  CTX s 383  ionized Ca  5.9 (<5.6) [facility]\par                               1/31/2019:  no compression fractures.  Low vit D  \par                                12/18/2018:  BD Lin Spine T -3.0, TH -1.4, FN -2.2, forearm -2.5\par                               11/8/19 hypercalcemia  Ca 10.5 (8.6 - 10.4) [facility]  CTXs 429  Vit D 30 (no PTH)\par                                   4/30/20 [facility]:  PTH 94;  CTXs 598\par                                12/21/2020:  BD Lin  Spine T -3.1\par                   Thyroid nodule  2.3 cm L, FNAB 6/2017 [facility] - benign- low cellularity\par                   Kidney stones  10/2015  Kidney - asymptomatic - saw Dr. Alex\par                    Hypercalciuria   (5/2020 calcium 302 mg/w hrs at [facility])\par \par \par Last Prolia 9/27/2021 and so next likely 3/27/2022 - Rx sent and she will also call\par Getting calcium in diet and taking 1000 iu vit D daily  \par \par Imp:  Doing well, no falls.\par Plan:  Next Prolia ~ 3/27/2022 followed by ~ 9/27/2022 and ROV here in 6/2022 and then\par more labs.  \par \par Sep 03, 2021        iPhone    at [place]\par \par PCP:     Dr. Catie Mcnally\par             Gyn: Dr. Caity Reynolds\par             Urol: Dr. Uriel Alex (stones - one episode) \par             Oral surgeon: Dr. Nugent - dental implants\par            .\par            CC: Osteoporosis (prev. Forteo): Fosamax Jan 2014 2/23/2018  CTX s 383  ionized Ca  5.9 (<5.6) [facility]\par                               1/31/2019:  no compression fractures.  Low vit D  \par                                12/18/2018:  BD Lin Spine T -3.0, TH -1.4, FN -2.2, forearm -2.5\par                               11/8/19 hypercalcemia  Ca 10.5 (8.6 - 10.4) [facility]  CTXs 429  Vit D 30 (no PTH)\par                                   4/30/20 [facility]:  PTH 94;  CTXs 598\par                                12/21/2020:  BD Lin  Spine T -3.1\par                   Thyroid nodule  2.3 cm L, FNAB 6/2017 [facility] - benign- low cellularity\par                   Kidney stones  10/2015 US Kidney - asymptomatic - saw Dr. Alex\par                    Hypercalciuria   (5/2020 calcium 302 mg/w hrs at [facility])\par \par Visits for osteoporosis (also thyroid nodule, hypercalciuria, hyperparathyroid - mostly normocalcemic, neg sestamibi; Hx low vitamin D)\par Recently received second Pfizer Covid vaccine.\par \par 6/3/2021 labs at [place] included\par calcium 10.7 (< 10.5)\par PTH 43  (had been 71 in October 2020 when calcium was 9.7)\par \par Treated with Forteo followed by alendronate.   After holiday from alendronate, started on Prolia #1 on Feb 19, 2021\par She is on vit D 1000 iu daily.   Prolia #2 could have been Aug 19.  \par \par Imp: Osteoporosis of spine.\par    Next BD late Dec 2022\par   Prolia #2 can be now after updated vitamin D level.\par   ROV within 5 months\par \par \par \par \par Feb 11, 2021   phone i     [phone] \par \par PCP:     Dr. Catie Mcnally\par             Gyn: Dr. Caity Reynolds\par             Urol: Dr. Uriel Alex (stones - one episode) \par             Oral surgeon: Dr. Nugent - dental implants\par            .\par            CC: Osteoporosis (prev. Forteo): Fosamax Jan 2014 2/23/2018  CTX s 383  ionized Ca  5.9 (<5.6) [facility]\par                               1/31/2019:  no compression fractures.  Low vit D  \par                                12/18/2018:  BD Lin Spine T -3.0, TH -1.4, FN -2.2, forearm -2.5\par                               11/8/19 hypercalcemia  Ca 10.5 (8.6 - 10.4) [facility]  CTXs 429  Vit D 30 (no PTH)\par                   Thyroid nodule  2.3 cm L, FNAB 6/2017 [facility] - benign- low cellularity\par                   Kidney stones  10/2015 US Kidney - asymptomatic - saw Dr. Alex\par                    Hypercalciuria\par \par Visits for osteoporosis (also thyroid nodule, hyperparathroid)\par Recenty received second Pfizer Covid vaccine.\par \par Has not required any oral surgery in past few years.\par She used to have low vitamin D, now takes 1000 iu daily\par Spine T -3.1, Z -0.6.   T down from previus T of -3.0\par \par Impression:  Not good candidate for parathyroid surgery.\par Plan:  Consider for Prolia  \par \par \par \par Nov 25, 2020    in person\par \par PCP:     Dr. Catie Mcnally\par             Gyn: Dr. Caity Reynolds\par             Urol: Dr. Uriel Alex (stones - one episode) \par             Oral surgeon: Dr. Nugent - dental implants\par            .\par            CC: Osteoporosis (prev. Forteo): Fosamax Jan 2014 2/23/2018  CTX s 383  ionized Ca  5.9 (<5.6) [facility]\par                               1/31/2019:  no compression fractures.  Low vit D  \par                                12/18/2018:  BD Lin Spine T -3.0, TH -1.4, FN -2.2, forearm -2.5\par                               11/8/19 hypercalcemia  Ca 10.5 (8.6 - 10.4) [facility]  CTXs 429  Vit D 30 (no PTH)\par                   Thyroid nodule  2.3 cm L, FNAB 6/2017 [facility] - benign- low cellularity\par                   Kidney stones  10/2015 US Kidney - asymptomatic - saw Dr. Alex\par                    Hypercalciuria\par \par Saw her oral surgeon, Dr. Nugent, in the Spring.   No tooth extractions or dental implants planned in foreseeable future.\par 4/30/20  [facility] included calcium 10.1 PTH 94\par osteocalcin 27\par \par vit D 30\par 1,25 82 (18-72)\par 24 hour urine calcium 255,   creat 0.84 gm\par \par : :\par Constitutional:  Alert, well nourished, healthy appearance, no acute distress \par Eyes:  No proptosis, no lid lag\par Thyroid:\par Pulmonary:  No respiratory distress, no accessory muscle use; normal rate and effort\par Cardiac:  Normal rate\par Vascular: \par Endocrine:  No stigmata of Cushing’s Syndrome\par Musculoskeletal:  Normal gait, no involuntary movements\par Neurology:  No tremors\par Affect/Mood/Psych:  Oriented x 3; normal affect, normal insight/judgement, normal mood \par .\par \par Impression:  Osteoporosis of spine.    No compression or other fractures.\par Hypercalciuria with normocalcemic hyperparathyroidism.  \par \par Impression:  She is eligible for follow up bone density after December 18, 2020 and will likely go for\par Prolia injection after that.  \par \par \par October 20, 2020 labs at [place] included\par TSH 1.99\par B12 491\par PTH 71 (15-65)\par calcium 9.7  \par \par \par \par \par May 05, 2020  Telephone Visit\par \par PCP:     Dr. ZEB Saleh\par             Gyn: Dr. Caity Reynolds\par             Urol: Dr. Uriel Alex (stones - one episode) \par             Oral surgeon: Dr. Nugent - dental implants\par            .\par            CC: Osteoporosis (prev. Forteo): Fosamax Jan 2014 2/23/2018  CTX s 383  ionized Ca  5.9 (<5.6) [facility]\par                               1/31/2019:  no compression fractures.  Low vit D  \par                                6/27/19 BD Lin Spine T -3.0, TH -1.4, FN -2.2, forearm -2.5\par                               11/8/19 hypercalcemia  Ca 10.5 (8.6 - 10.4) [facility]  CTXs 429  Vit D 30 (no PTH)\par                   Thyroid nodule  2.3 cm L, FNAB 6/2017 [facility] - benign- low cellularity\par                   Kidney stones  10/2015 US Kidney - asymptomatic \par \par No history of fracture.  Spine T score -3.0  12/18/2018\par \par Recent labs include\par 24 hour urine calcium of 255 mg\par 25 OH vit D 30 on ~1000 iu daily.\par PTH 94 (<94)\par \par Labs at [facility] 11/8/2019 included\par calcium 10.5\par \par \par \par Impression:  Trajectory of spine bone density previously improved, but appears to have plateaued and perhaps may be heading back down.  Not an ideal candidate for PTX.   May consider Prolia in the future.  \par \par Impression:  On "holiday" from alendronate.  \par \par \par Nov 12, 2019\par \par PCP:     Dr. ZEB Saleh\par             Gyn: Dr. Caity Reynolds\par             Urol: Dr. Uriel Alex (stones - one episode) \par             Oral surgeon: Dr. Nugent - dental implants\par            .\par            CC: Osteoporosis (prev. Forteo): Fosamax Jan 2014 2/23/2018  CTX s 383  ionized Ca  5.9 (<5.6) [facility]\par                               1/31/2019:  no compression fractures.  Low vit D  \par                                6/27/19 BD Lin Spine T -3.0, TH -1.4, FN -2.2, forearm -2.5\par                               11/8/19 hypercalcemia  Ca 10.5 (8.6 - 10.4) [facility]  CTXs 429  Vit D 30 (no PTH)\par                   Thyroid nodule  2.3 cm L, FNAB 6/2017 [facility] - benign- low cellularity\par                   Kidney stones  10/2015 US Kidney - asymptomatic \par \par \par #  Will ask her to have f/u US thyroid within next year.\par \par #  Osteoporos of spine \par         Has been on "holiday" from alendronate.  \par \par         Impression:  Previously diagnosed with normocalcemic hyperparathryoidism; however, most recent\par           [facility] calcium slightly elevated at 10.5, normal up to 10.4, with normal renal function and\par          25 OH vitmin D level of 30  (but no PTH)\par \par           X-ray spine neg:\par               Lumbosacral Spine.\par \par Frontal, lateral and cone-down projections demonstrate satisfactory alignment of the bony structures. No fracture or vertebral compression can be appreciated. There is no evidence of pedicle destruction. Intervertebral disc spaces are satisfactorily maintained. The sacroiliac joints are symmetric. As compared to the patient's previous study of 9/29/2014, there has been no significant change of an adverse nature.\par \par \par IMPRESSION: No evidence of lumbar compression deformity. No significant change from 9/29/2014.\par \par \par ***Electronically Signed ***\par -----------------------------------------------\par Wojciech Biggs MD              01/31/19 1454\par \par \par IMPRESSION:\par \par No focal site of initial increased and persistent sestamibi tracer localization is evident to specifically suggest uptake in parathyroid adenoma or hyperplasia.\par \par The left thyroid lobe is asymmetrically much larger than the right with much more intense tracer localization.\par \par \par \par \par ***Electronically Signed ***\par -----------------------------------------------\par Donovan Hollis MD              12/06/18 \par \par Imp:  Sestamibi negative.      \par Plan:  24 hour Ca at [facility] with PTH, vit D before next visit in 6 months - will likely decide on\par Prolia at that time; however, will also reconsider evaluation of parathyroid.  \par \par \par \par \par June 27, 2019\par \par PCP:     Dr. ZEB Saleh (to see) ******\par             Gyn: Dr. Caity Reynolds\par             Urol: Dr. Uriel Alex (stones - one episode) \par             Oral surgeon: Dr. Nugent - dental implants\par            .\par            CC: Osteoporosis (prev. Forteo): Fosamax Jan 2014\par \par 77 yo on "holiday" from Fosamax.  \par Lab tests from yesterday at [facility] include \par TSH 1.81\par 25 OH vit D 24\par \par Lumbar spine, L1 to L2, total: 0.647 gm./cm.2.\par \par T-score: -3.0\par \par Z-score: -0.6\par \par Left hip: 0.767 gm./cm.2.\par \par T-score: -1.4 \par \par Z-score: 0.6\par \par Left femoral neck: 0.606\par \par T score: -2.2\par \par Z score: 0.1\par \par  Left forearm, distal third: 0.544\par \par T score: -2.5\par \par Z score: 0.5\par \par On holiday from alendronate.\par \par Last dental implant a few weeks ago.   After a bone graft !\par No other implants on the horizon.\par Plan:  Updated labs late Nov, ROV Dec.\par Likely Prolia next time around  \par \par January 31, 2019\par \par PCP: Dr. Supriya Segovia/Dr. Wojciech Lee\par             Gyn: Dr. Caity Reynolds\par             Urol: Dr. Uriel Alex (stones - one episode) \par             Oral surgeon: Dr. Nugent - dental implants\par            .\par            CC: Osteoporosis (prev. Forteo): Fosamax Jan 2014\par             Last BD: 12/12/2016\par             Asymptomatic kidney stones **\par             Hypercalciuria **\par             (Lyme )\par             History of partial thyroid lobectomy;\par             Thyroid nodules (sono 12/12/2016)\par             -FNAB 6/7/17 [facility] of 2.3 cm L nodule: neg \par             History of I-131 for hyperthyroidism\par             new: DVT LLE 6/2017.\par            " IMPRESSION: No significant change in the appearance of the thyroid since 12/12/2016, including a \par            previously biopsied left lower pole nodule, and an hypoechoic nodule at the posterior aspect of the \par            left upper pole that could be either exophytic or extrathyroidal (parathyroid). In view of the \par            history of hyperparathyroidism, follow-up radionuclide parathyroid scintigraphy can be performed \par            for further more specific evaluation in this regard. \par            ***Electronically Signed *** \par            ----------------------------------------------- \par            Donovan Hollis MD 05/15/18 \par \par \par Recent bone density shows spine T -3.1 and trending down.\par Ultrasound thyroid, no significant change since 12/12/2016 including previously biopsied nodule.  \par Sestamibi parathyroid scan : negative  \par Recent [facility] ionized calcium 5.7 (4.9 - 5.6) **\par PTH 73 (14-64) \par CTXs  653\par osteocalcin 24\par phos 3.3\par TSH 2.88\par vit D 28\par \par No compression fx in 2014.\par \par Imp:  Normocalcemic hyperparathyroid.\par Would benefit from antiresorptive.\par Consider Evenity when available.\par ROV 6 months\par \par \par Previous notes from [employer] appended below.\par \par  June 22, 2018\par            .\par            PCP: Dr. Supriya Segovia/Dr. Wojciech Lee\par             Gyn: Dr. Caity Reynolds\par             Urol: Dr. Uriel Alex (stones - one episode) \par             Oral surgeon: Dr. Nugent - dental implants\par            .\par            CC: Osteoporosis (prev. Forteo): Fosamax Jan 2014\par             Last BD: 12/12/2016\par             Asymptomatic kidney stones **\par             Hypercalciuria **\par             (Lyme )\par             History of partial thyroid lobectomy;\par             Thyroid nodules (sono 12/12/2016)\par             -FNAB 6/7/17 [facility] of 2.3 cm L nodule: neg \par             History of I-131 for hyperthyroidism\par             new: DVT LLE 6/2017.\par            " IMPRESSION: No significant change in the appearance of the thyroid since 12/12/2016, including a \par            previously biopsied left lower pole nodule, and an hypoechoic nodule at the posterior aspect of the \par            left upper pole that could be either exophytic or extrathyroidal (parathyroid). In view of the \par            history of hyperparathyroidism, follow-up radionuclide parathyroid scintigraphy can be performed \par            for further more specific evaluation in this regard. \par            ***Electronically Signed *** \par            ----------------------------------------------- \par            Donovan Hollis MD 05/15/18 \par            .\par            .\par            Impression: Based on above, I will ask her to stop by [facility] for sestamibi parathroid scan and to return after that.\par            can then decide on when and if to start another round of treatment for the osteoporosis. \par            .\par            ==\par            .\par            Feb 2, 2018\par            .\par            PCP: Dr. Supriya Segovia/Dr. Wojciech Lee\par             Gyn: Dr. Caity Reynolds\par             Urol: Dr. Uriel Alex (stones - one episode) \par             Oral surgeon: Dr. Nugent - dental implants\par            .\par            CC: Osteoporosis (prev. Forteo): Fosamax Jan 2014\par             Last BD: 12/12/2016\par             Asymptomatic kidney stones **\par             Hypercalciuria **\par             (Lyme )\par             History of partial thyroid lobectomy;\par             Thyroid nodules (sono 12/12/2016)\par             -FNAB 6/7/17 [facility] of 2.3 cm L nodule: neg \par             History of I-131 for hyperthyroidism\par             new: DVT LLE 6/2017.\par            .\par            Stopped a/c two weeks ago.\par            Stopped Fosamax end of Dec 2017 after 4 years. \par            . \par            Labs ([facility]) from \par            1/23/2018\par            TSH 2.66\par            calcium 10.3\par            ionized calcium 5.9 (4.8 - 5.6) ***\par            phos 3.4\par            PTH 64 (14-64) ***\par            25 OH vit D\par            CTXs 383 ****\par            .\par            Impression: Took two years of Forteo, 4 years alendronate, started "holiday" recently, has dental imiplants, eligible for BD Dec 2018 and will likely benefit from:\par            -sestamibi parathyroid scan\par            -Prolia after next BD\par             - need fu u/s thyroid around April and RDV May. \par            .\par            ==\par            .\par            Sept 15, 2017\par            .\par            PCP: Dr. Supriya Segovia\par             Gyn: Dr. Caity Reynolds\par             Urol: Dr. Uriel Alex (stones - one episode) \par            .\par            CC: Osteoporosis (prev. Forteo): Fosamax Jan 2014\par             Last BD: 12/12/2016\par             Asymptomatic kidney stones **\par             Hypercalciuria **\par             (Lyme )\par             History of partial thyroid lobectomy;\par             Thyroid nodules (sono 12/12/2016)\par             History of I-131 for hyperthyroidism\par             new: DVT LLE 6/2017.\par            .\par            Went for FNAB thyroid nodule at [facility] - cytology read as benign\par            .\par            Bone density stable and December 2017 she will have been on Fosamax for 4 years, so will advise "holiday".\par            .\par            Now on Xaralto because of L DVT:\par            .\par            .\par            "6/23/2017\par            IMPRESSION: Positive DVT study with thrombus extending from the posterior tibial vein into the popliteal vein."\par            .\par            My impression: \par            Labs at [facility] on \par            9/11/2017 included \par            calcium 10.2 (8.6-10.4)\par            phos 3.2\par            PTH 73 (14-64)\par            TSH 2.55 \par            25 OH vit D 28\par            CTXs 282 (on alendronate) \par            .\par            Impression: \par            FNAB thyroid at [facility] (after first at [place] "[facility]") reassuring.\par            .\par             Normocalcemic hyperparathyroidism.\par            Bone density stable.\par            Can go on "holiday" from alendronate.\par            Monitor calcium/PTH\par            ROV February. \par            Next BD ~Dec 2019\par            .\par            ==\par            .\par            May 5, 2017\par            .\par            PCP: Dr. Supriya Segovia\par             Gyn: Dr. Caity Reynolds\par             Urol: Dr. Uriel Alex (stones - one episode) \par            .\par            CC: Osteoporosis (prev. Forteo): Fosamax Jan 2014\par             Last BD: 12/12/2016\par             Asymptomatic kidney stones **\par             Hypercalciuria **\par             (Lyme )\par             History of partial thyroid lobectomy;\par             Thyroid nodules (sono 12/12/2016)\par             History of I-131 for hyperthyroidism\par             .\par            .\par            Imp/Plan:\par            .\par            Biopsy of dominant thyroid nodule at [place] was not successful.\par            Went to [facility] for sonogram and Dr. Phipps also advised FNAB so will ask her to try there. \par            .\par            Bone density seems to have improved on Fosamax. End of this year will be four years and a reasonable time to consider a "holiday" if she and the rest of her team agree.\par            .\par            Monitor vit D and PTH level.\par            ROV in September. \par            .\par            .\par            .\par            ==\par            .\par            March 3, 2017\par            .\par            PCP: Dr. Supriya Segovia\par             Gyn: Dr. Caity Reynolds\par            .\par            CC: Osteoporosis (prev. Forteo): Fosamax Jan 2014\par             Last BD: 12/12/2016\par             Asymptomatic kidney stones \par             Hypercalciuria \par             (Lyme )\par             History of partial thyroid lobectomy;\par             Thyroid nodules (sono 12/12/2016)\par             History of I-131 for hyperthyroidism\par            .\par            Visited [facility] and enjoyed it. \par            .\par            Labs ([facility])\par            24 hour urine collection \par            1 g creatinine \par            calcium 287 (<250)\par            .\par            creatinine 0.81\par            uric acid 4.2\par            serum calcium 10.4 (8.6-10.4) \par            PTH 60 \par            25 OH vit D 27 \par            CTXs 278\par            .\par            Bone density stable\par            Teeth stable\par            Thyroid sono advises FNAB of nodule (she is s/p partial lobectomy in college and I-131 for hyperthyroidism several years ago)\par            .\par            Impression: Because of evidence of possible normocalcemic hyperparathyroidism, she went for ultrasound of thyroid/parathyroid and nodules detected with recommendation for FNAB \par            .\par            Osteoporosis currently stable. \par            .\par            Plan: ROV in two months (after FNAB of thyroid nodule)\par            .\par            ==\par            .;\par            October 7, 2016\par            .\par            PCP: Dr. Supriya Segovia\par             Gyn: Dr. Caity Reynolds\par            .\par            CC: Osteoporosis (prev. Forteo): Fosamax Jan 2014\par             (Lyme last \par            .\par            Dental issues are quiet\par            Remains on alendronate weekly.\par            .\par            Blood tests ([facility]) from \par            3/18/2016 inclued\par            BS 87 mg/dl\par            calcium 9.1\par            phos 3.1\par            TSH 2.28 \par            Vit B12 367\par            PTH 78 (14-65) \par            creat 0.79\par            vit D 35\par            ionized calcium - normal \par            .\par            Did not yet go for BD.\par            For problem with urination saw Uriel at [place]. \par            sono showed bilateral tiny kidney stones. \par            stopped calcium supp after that. \par            (mother had stones)\par            .\par            Plan: sono thyroid\par            repeat PTH\par            24 hour urine ca/stone former\par            bone density\par            ROV Feb\par            continue Fosamax\par            .\par            ==\par            .\par            March 18, 2016\par            .\par            PCP: Dr. Supriya Segovia\par             Gyn: Dr. Caity Reynolds\par            .\par            CC: Osteoporosis (prev. Forteo): Fosamax Jan 2014\par             (Lyme last \par            .\par            Had bone grafting lower part of her mouth - last September.\par            .\par            Plan: Updated labs now.\par            ROV late October after BD\par            .\par            ==\par            .\par            June 5, 2015\par            .\par            PCP: Dr. Supriya Segovia\par             Gyn: Dr. Caity Reynolds\par            .\par            CC: Osteoporosis (prev. Forteo): Fosamax Jan 2014\par             (Lyme last \par            .\par            [facility]\par            .\par            Recent [facility] labs show 25 OH vit 41\par            CTXs 148\par            ionized calcium 5.7 (4.8-5.6) \par            .\par            Impression: Took Forteo for two years and has been on Fosamax since Jan 2014.\par            .\par            Impression: Dental issues are quiet. Serial bone densities show considerable improvement over time, especially in her spine. Last BD Sept 2014 showed spine T -2.6 Z -0.3.\par            .\par            Plan: Continue Fosamax for 3-5 years.\par            .Next BD ~ Oct 2016.\par            ROV here in March.\par            .\par            .\par            ===\par            October 28, 2014\par            PCP: Dr. Supriya Segovia\par            CC: Osteoporosis (prev. Forteo)\par            .\par            Treated for Lyme over the summer.\par            On Fosamax since Jan 2014.\par            April 2014 CTXs 192 (10/2013 CTXs 341).\par             vit D 36\par            .\par            Much of improvement in bone density was likely from the Forteo and she is now holding her own with Fosamax. \par            .\par            Dental implant fine.\par            .\par            Plan: Continue the Fosamax for up to 3 years and then consider Prolia.\par            ROV six months. \par            .\par            ===\par            April 8, 2014\par            PCP: Dr. Supriya Segovia\par            CC: Osteoporosois\par            .\par            Took Forteo for two years.\par            Because of dental issues preferred to take Calcitonin after that.\par            Switched to Fosamax around Januiary 2014.\par            Notes no symptoms from Fosamax.\par            Her densist, Dr. Nugent, is pleased with her progress.\par            She goes for teeth cleaning every 3 months.\par            .\par            Impression: Markers of bone turnover have decreased on Fosamax, predicting absorption and effectiveness.\par            .\par            Plan: Same Rx. Follow up BD two years after last. \par            .\par            .\par            ====\par            October 28, 2013\par            PCP: Dr. Supriya Segovia\par            CC: Osteoporosis (occasion hypercalcemia); previous Forteo\par            .\par            Blood tests at [facility] in July showed calcium 10.3, PTH 47\par            CTXs 342. \par            BSA 18.1 (14.2 - 42.7)\par            .\par            Labs from today still pending.\par            Dr. Nugent said: "Dr. Hellerman can do whatever he wants now."\par            Has a 3 month supply of calcitonin. \par            Plan: After finish\par            .\par            ========\par            March 26, 2013 Returns for osteoporosis. Has not yet received approval from Dr. Nugent to have more aggressive treatment than calcitonin (such as Fosamax, Reclast, or Prolia). She will be seeing Dr. Nugent within 3 months and will get his approval. In the meatime, she will stay on the calcitonin nasal spray. \par            .\par            Had blood tests last week at [facility] in [place]. \par  \par  ROS:  \par  \par

## 2022-07-27 ENCOUNTER — APPOINTMENT (OUTPATIENT)
Dept: INTERNAL MEDICINE | Facility: CLINIC | Age: 82
End: 2022-07-27

## 2022-07-27 VITALS
TEMPERATURE: 98.5 F | HEART RATE: 68 BPM | WEIGHT: 151 LBS | BODY MASS INDEX: 28.51 KG/M2 | SYSTOLIC BLOOD PRESSURE: 118 MMHG | OXYGEN SATURATION: 98 % | DIASTOLIC BLOOD PRESSURE: 80 MMHG | HEIGHT: 61 IN

## 2022-07-27 DIAGNOSIS — Z86.39 PERSONAL HISTORY OF OTHER ENDOCRINE, NUTRITIONAL AND METABOLIC DISEASE: ICD-10-CM

## 2022-07-27 PROCEDURE — 99214 OFFICE O/P EST MOD 30 MIN: CPT

## 2022-07-27 NOTE — REVIEW OF SYSTEMS
[Vision Problems] : vision problems [Back Pain] : no back pain [Negative] : Heme/Lymph [FreeTextEntry3] : see HPI

## 2022-07-27 NOTE — HISTORY OF PRESENT ILLNESS
[(Patient denies any chest pain, claudication, dyspnea on exertion, orthopnea, palpitations or syncope)] : Patient denies any chest pain, claudication, dyspnea on exertion, orthopnea, palpitations or syncope [Good (7-10 METs)] : Good (7-10 METs) [FreeTextEntry1] : cataract sx  R 8/3  , L 8/31 [FreeTextEntry2] : 8/3 and 8/31 [FreeTextEntry3] : dr Lawrence [FreeTextEntry4] : Lizbeth comes in today for preoperative evaluation prior to bilateral cataract surgery with Dr. Lepe in August.\par She feels fine, in her usual state of health and active.  She only complains of decreasing vision and unable to drive at night.

## 2022-07-27 NOTE — ASSESSMENT
[Patient Optimized for Surgery] : Patient optimized for surgery [No Further Testing Recommended] : no further testing recommended [As per surgery] : as per surgery [FreeTextEntry4] : Ms. SUE  is a 81 year-old female  with no significant history of coronary artery disease.  She  denies chest pain, palpitations or shortness of breath and her EKG today is normal.  She  doesn't take blood thinners and does not take prescription oral medications\par Ms. SUE  has a low risk for perioperative cardiovascular complications and will undergo the procedure as planned provided today's blood work is favorable.\par

## 2022-07-27 NOTE — PHYSICAL EXAM
[Normal] : normal gait, coordination grossly intact, no focal deficits [de-identified] : bilat cataracts

## 2022-08-03 ENCOUNTER — TRANSCRIPTION ENCOUNTER (OUTPATIENT)
Age: 82
End: 2022-08-03

## 2022-08-31 ENCOUNTER — TRANSCRIPTION ENCOUNTER (OUTPATIENT)
Age: 82
End: 2022-08-31

## 2022-10-06 ENCOUNTER — APPOINTMENT (OUTPATIENT)
Dept: INTERNAL MEDICINE | Facility: CLINIC | Age: 82
End: 2022-10-06

## 2022-10-06 VITALS
WEIGHT: 149 LBS | HEART RATE: 60 BPM | HEIGHT: 61 IN | BODY MASS INDEX: 28.13 KG/M2 | SYSTOLIC BLOOD PRESSURE: 112 MMHG | TEMPERATURE: 98.2 F | OXYGEN SATURATION: 98 % | DIASTOLIC BLOOD PRESSURE: 68 MMHG

## 2022-10-06 DIAGNOSIS — Z00.00 ENCOUNTER FOR GENERAL ADULT MEDICAL EXAMINATION W/OUT ABNORMAL FINDINGS: ICD-10-CM

## 2022-10-06 DIAGNOSIS — Z23 ENCOUNTER FOR IMMUNIZATION: ICD-10-CM

## 2022-10-06 DIAGNOSIS — H26.9 UNSPECIFIED CATARACT: ICD-10-CM

## 2022-10-06 DIAGNOSIS — Z01.818 ENCOUNTER FOR OTHER PREPROCEDURAL EXAMINATION: ICD-10-CM

## 2022-10-06 DIAGNOSIS — E53.8 DEFICIENCY OF OTHER SPECIFIED B GROUP VITAMINS: ICD-10-CM

## 2022-10-06 PROCEDURE — G0439: CPT

## 2022-10-06 PROCEDURE — G0009: CPT

## 2022-10-06 PROCEDURE — 99213 OFFICE O/P EST LOW 20 MIN: CPT | Mod: 25

## 2022-10-06 PROCEDURE — 90677 PCV20 VACCINE IM: CPT

## 2022-10-06 RX ORDER — BROMFENAC SODIUM 0.7 MG/ML
0.07 SOLUTION/ DROPS OPHTHALMIC
Qty: 3 | Refills: 0 | Status: DISCONTINUED | COMMUNITY
Start: 2022-07-25 | End: 2022-10-06

## 2022-10-06 RX ORDER — MOXIFLOXACIN OPHTHALMIC 5 MG/ML
0.5 SOLUTION/ DROPS OPHTHALMIC
Qty: 3 | Refills: 0 | Status: DISCONTINUED | COMMUNITY
Start: 2022-07-25 | End: 2022-10-06

## 2022-10-06 NOTE — PHYSICAL EXAM
[Normal] : normal sclera/conjunctiva, PERRL, EOMI [de-identified] : bilat dense no gross lumps [de-identified] : L eyebrow cyst

## 2022-10-06 NOTE — REVIEW OF SYSTEMS
[Skin Rash] : skin rash [Negative] : ENT [Discharge] : no discharge [Pain] : no pain [Vision Problems] : no vision problems [Itching] : no itching [Back Pain] : no back pain [de-identified] : L upper eyebrow cyst

## 2022-10-06 NOTE — HEALTH RISK ASSESSMENT
[Good] : ~his/her~  mood as  good [Never] : Never [Yes] : Yes [0] : 2) Feeling down, depressed, or hopeless: Not at all (0) [PHQ-2 Negative - No further assessment needed] : PHQ-2 Negative - No further assessment needed [Patient reported mammogram was normal] : Patient reported mammogram was normal [Patient reported bone density results were abnormal] : Patient reported bone density results were abnormal [With Family] : lives with family [Retired] : retired [College] : College [] :  [# Of Children ___] : has [unfilled] children [Fully functional (bathing, dressing, toileting, transferring, walking, feeding)] : Fully functional (bathing, dressing, toileting, transferring, walking, feeding) [Fully functional (using the telephone, shopping, preparing meals, housekeeping, doing laundry, using] : Fully functional and needs no help or supervision to perform IADLs (using the telephone, shopping, preparing meals, housekeeping, doing laundry, using transportation, managing medications and managing finances) [Reports changes in hearing] : Reports changes in hearing [[id number]] : 0 [Reports changes in vision] : Reports no changes in vision [MammogramDate] : 01/21 [BoneDensityDate] : 01/20

## 2022-10-07 LAB
25(OH)D3 SERPL-MCNC: 39.5 NG/ML
ALBUMIN SERPL ELPH-MCNC: 4.7 G/DL
ALP BLD-CCNC: 53 U/L
ALT SERPL-CCNC: 15 U/L
ANION GAP SERPL CALC-SCNC: 9 MMOL/L
AST SERPL-CCNC: 13 U/L
BASOPHILS # BLD AUTO: 0.05 K/UL
BASOPHILS NFR BLD AUTO: 0.6 %
BILIRUB SERPL-MCNC: 0.6 MG/DL
BUN SERPL-MCNC: 12 MG/DL
CALCIUM SERPL-MCNC: 10.1 MG/DL
CHLORIDE SERPL-SCNC: 104 MMOL/L
CHOLEST SERPL-MCNC: 213 MG/DL
CO2 SERPL-SCNC: 27 MMOL/L
CREAT SERPL-MCNC: 0.76 MG/DL
EGFR: 78 ML/MIN/1.73M2
EOSINOPHIL # BLD AUTO: 0.1 K/UL
EOSINOPHIL NFR BLD AUTO: 1.2 %
GLUCOSE SERPL-MCNC: 93 MG/DL
HCT VFR BLD CALC: 46.3 %
HDLC SERPL-MCNC: 74 MG/DL
HGB BLD-MCNC: 14.8 G/DL
IMM GRANULOCYTES NFR BLD AUTO: 0.2 %
LDLC SERPL CALC-MCNC: 124 MG/DL
LYMPHOCYTES # BLD AUTO: 2.43 K/UL
LYMPHOCYTES NFR BLD AUTO: 29.6 %
MAN DIFF?: NORMAL
MCHC RBC-ENTMCNC: 31.9 PG
MCHC RBC-ENTMCNC: 32 GM/DL
MCV RBC AUTO: 99.8 FL
MONOCYTES # BLD AUTO: 0.59 K/UL
MONOCYTES NFR BLD AUTO: 7.2 %
NEUTROPHILS # BLD AUTO: 5.01 K/UL
NEUTROPHILS NFR BLD AUTO: 61.2 %
NONHDLC SERPL-MCNC: 138 MG/DL
PLATELET # BLD AUTO: 209 K/UL
POTASSIUM SERPL-SCNC: 4.5 MMOL/L
PROT SERPL-MCNC: 6.7 G/DL
RBC # BLD: 4.64 M/UL
RBC # FLD: 13.2 %
SODIUM SERPL-SCNC: 140 MMOL/L
T4 FREE SERPL-MCNC: 1.1 NG/DL
TRIGL SERPL-MCNC: 74 MG/DL
TSH SERPL-ACNC: 1.61 UIU/ML
VIT B12 SERPL-MCNC: 526 PG/ML
WBC # FLD AUTO: 8.2 K/UL

## 2022-10-10 LAB
APPEARANCE: CLEAR
BACTERIA: NEGATIVE
BILIRUBIN URINE: NEGATIVE
BLOOD URINE: NEGATIVE
COLOR: YELLOW
GLUCOSE QUALITATIVE U: NEGATIVE
HYALINE CASTS: 2 /LPF
KETONES URINE: NEGATIVE
LEUKOCYTE ESTERASE URINE: ABNORMAL
MICROSCOPIC-UA: NORMAL
NITRITE URINE: NEGATIVE
PH URINE: 6.5
PROTEIN URINE: NORMAL
RED BLOOD CELLS URINE: 1 /HPF
SPECIFIC GRAVITY URINE: 1.02
SQUAMOUS EPITHELIAL CELLS: 2 /HPF
UROBILINOGEN URINE: NORMAL
WHITE BLOOD CELLS URINE: 22 /HPF

## 2022-11-07 ENCOUNTER — RESULT REVIEW (OUTPATIENT)
Age: 82
End: 2022-11-07

## 2022-11-10 ENCOUNTER — APPOINTMENT (OUTPATIENT)
Dept: INTERNAL MEDICINE | Facility: CLINIC | Age: 82
End: 2022-11-10

## 2022-11-10 VITALS
TEMPERATURE: 98.4 F | WEIGHT: 149 LBS | HEART RATE: 72 BPM | OXYGEN SATURATION: 98 % | SYSTOLIC BLOOD PRESSURE: 124 MMHG | HEIGHT: 61 IN | BODY MASS INDEX: 28.13 KG/M2 | DIASTOLIC BLOOD PRESSURE: 70 MMHG

## 2022-11-10 DIAGNOSIS — E21.0 PRIMARY HYPERPARATHYROIDISM: ICD-10-CM

## 2022-11-10 DIAGNOSIS — Z87.442 PERSONAL HISTORY OF URINARY CALCULI: ICD-10-CM

## 2022-11-10 PROCEDURE — 99214 OFFICE O/P EST MOD 30 MIN: CPT

## 2022-11-10 NOTE — REVIEW OF SYSTEMS
[Negative] : Neurological [Discharge] : no discharge [Pain] : no pain [Vision Problems] : no vision problems [Itching] : no itching [Back Pain] : no back pain [Skin Rash] : no skin rash [FreeTextEntry3] : L eyebrow  see HPI

## 2022-11-10 NOTE — PHYSICAL EXAM
[Normal] : normal gait, coordination grossly intact, no focal deficits and deep tendon reflexes were 2+ and symmetric [de-identified] : L eyebrow inner side  cyst, hard, nontender [de-identified] : skin cyst

## 2022-11-10 NOTE — HISTORY OF PRESENT ILLNESS
[de-identified] : here for medical f/u and BDS results discussion\par  still concerned about the growth L eyebrow , maybe getting bigger, \par currently doing some dental work with bone graft

## 2022-11-10 NOTE — HEALTH RISK ASSESSMENT
[0] : 2) Feeling down, depressed, or hopeless: Not at all (0) [PHQ-2 Negative - No further assessment needed] : PHQ-2 Negative - No further assessment needed [[id number]] : 0

## 2022-11-11 ENCOUNTER — RESULT REVIEW (OUTPATIENT)
Age: 82
End: 2022-11-11

## 2022-11-25 ENCOUNTER — RESULT REVIEW (OUTPATIENT)
Age: 82
End: 2022-11-25

## 2022-12-01 ENCOUNTER — APPOINTMENT (OUTPATIENT)
Dept: BREAST CENTER | Facility: CLINIC | Age: 82
End: 2022-12-01

## 2022-12-01 VITALS — BODY MASS INDEX: 27.94 KG/M2 | WEIGHT: 148 LBS | HEIGHT: 61 IN

## 2022-12-01 DIAGNOSIS — Z80.3 FAMILY HISTORY OF MALIGNANT NEOPLASM OF BREAST: ICD-10-CM

## 2022-12-01 PROCEDURE — 99204 OFFICE O/P NEW MOD 45 MIN: CPT

## 2022-12-01 RX ORDER — DENOSUMAB 60 MG/ML
60 INJECTION SUBCUTANEOUS
Refills: 0 | Status: DISCONTINUED | COMMUNITY
Start: 2021-06-02 | End: 2022-12-01

## 2022-12-01 NOTE — HISTORY OF PRESENT ILLNESS
[FreeTextEntry1] : 82-year-old postmenopausal woman with a family history of breast cancer presents after screening mammogram shows a right upper inner quadrant spiculated mass with architectural distortion that on ultrasound was seen is a 1.5 cm hypoechoic suspicious mass at the 2 o'clock position, 3 cm from the nipple.  Patient underwent a right ultrasound-guided core biopsy of the cork clip placement which revealed a moderately differentiated invasive ductal carcinoma that is ER/NH positive HER2 equivocal with a Ki-67 of 10 to 20%.  Patient denies any breast masses or bone pain.  Patient's maternal cousin with breast cancer at age 35.  Patient is status post left breast biopsy in the past.  Patient took hormone replacement therapy for a few years.

## 2022-12-06 ENCOUNTER — NON-APPOINTMENT (OUTPATIENT)
Age: 82
End: 2022-12-06

## 2022-12-14 ENCOUNTER — APPOINTMENT (OUTPATIENT)
Dept: RADIATION ONCOLOGY | Facility: CLINIC | Age: 82
End: 2022-12-14

## 2022-12-14 VITALS
HEART RATE: 71 BPM | SYSTOLIC BLOOD PRESSURE: 134 MMHG | WEIGHT: 148 LBS | RESPIRATION RATE: 18 BRPM | BODY MASS INDEX: 27.94 KG/M2 | OXYGEN SATURATION: 98 % | DIASTOLIC BLOOD PRESSURE: 66 MMHG | HEIGHT: 61 IN

## 2022-12-14 PROCEDURE — 99205 OFFICE O/P NEW HI 60 MIN: CPT | Mod: 25

## 2022-12-19 NOTE — HISTORY OF PRESENT ILLNESS
[FreeTextEntry1] : Ms. Cody is an 82 year old postmenopausal female, diagnosed with a clinical Stage IA [id number] invasive ductal carcinoma of the right breast, referred for a consultation to discuss adjuvant radiation therapy. \par \par Screening mammography was performed on 21 that demonstrated a subcentimeter mass in the left breast. Sonographic evaluation of the left breast was performed 9:00, 4 cm from the nipple and demonstrated: a 0.5 x 0.3 x 0.5 cm complicated cyst, likely corresponding to the mammographic mass of concern in the central inner left breast.  \par \par The pathology report of the left FNA biopsy (21) indicates the following results:  Left breast cyst fluid, 9:00, aspirate:  Apocrine cells and histiocytes in a background of proteinaceous material consistent with cyst / cyst contents.    \par \par Diagnostic mammography was performed (22)  revealed a 1.2 cm suspicious mass in the 2:00 right breast, 3 cm from the nipple with a mammographic correlate, for which ultrasound-guided biopsy was recommended.  \par \par Ultrasound guided core needle biopsy of the right breast demonstrated: Invasive ductal carcinoma, histologic grade 2 \par  ER: Positive (%, moderate nuclear intensity staining)  \par  AK: Positive (%, moderate nuclear intensity staining)  \par  HER-2 by IHC: negative\par  Ki-67: 10-20%  \par \par GYN Hx: menarche at 10, menopause at 49, , first birth at 21, HRT use at menopause for couple years. \par Social Hx: past smoker in college, quit early 's. \par Family Hx: maternal cousin with breast cancer at age 35\par \par Of note, she reports she had a cyst on the left eyebrow that was biopsied last Friday by Dr. Dallas, pathology is pending. \par \par

## 2022-12-19 NOTE — LETTER CLOSING
[Consult Closing:] : Thank you for allowing me to participate in the care of this patient.  If you have any questions, please do not hesitate to contact me. [Sincerely yours,] : Sincerely yours, [FreeTextEntry3] : Maria Ines Banda MD\par

## 2022-12-19 NOTE — DISEASE MANAGEMENT
[Clinical] : TNM Stage: c [FreeTextEntry4] : Invasive Ductal Carcinoma of the Right Breast ER/CA+ [TTNM] : 1c [NTNM] : 0 [MTNM] : X [IA] : IA

## 2022-12-19 NOTE — PHYSICAL EXAM
[Normal] : no joint swelling, no clubbing or cyanosis of the fingernails and muscle strength and tone were normal

## 2022-12-19 NOTE — HISTORY OF PRESENT ILLNESS
[FreeTextEntry1] : Ms. Cody is an 82 year old postmenopausal female, diagnosed with a clinical Stage IA [id number] invasive ductal carcinoma of the right breast, referred for a consultation to discuss adjuvant radiation therapy. \par \par Screening mammography was performed on 21 that demonstrated a subcentimeter mass in the left breast. Sonographic evaluation of the left breast was performed 9:00, 4 cm from the nipple and demonstrated: a 0.5 x 0.3 x 0.5 cm complicated cyst, likely corresponding to the mammographic mass of concern in the central inner left breast.  \par \par The pathology report of the left FNA biopsy (21) indicates the following results:  Left breast cyst fluid, 9:00, aspirate:  Apocrine cells and histiocytes in a background of proteinaceous material consistent with cyst / cyst contents.    \par \par Diagnostic mammography was performed (22)  revealed a 1.2 cm suspicious mass in the 2:00 right breast, 3 cm from the nipple with a mammographic correlate, for which ultrasound-guided biopsy was recommended.  \par \par Ultrasound guided core needle biopsy of the right breast demonstrated: Invasive ductal carcinoma, histologic grade 2 \par  ER: Positive (%, moderate nuclear intensity staining)  \par  DE: Positive (%, moderate nuclear intensity staining)  \par  HER-2 by IHC: negative\par  Ki-67: 10-20%  \par \par GYN Hx: menarche at 10, menopause at 49, , first birth at 21, HRT use at menopause for couple years. \par Social Hx: past smoker in college, quit early 's. \par Family Hx: maternal cousin with breast cancer at age 35\par \par Of note, she reports she had a cyst on the left eyebrow that was biopsied last Friday by Dr. Dallas, pathology is pending. \par \par

## 2022-12-20 ENCOUNTER — APPOINTMENT (OUTPATIENT)
Dept: HEMATOLOGY ONCOLOGY | Facility: CLINIC | Age: 82
End: 2022-12-20

## 2022-12-20 VITALS
HEIGHT: 61 IN | WEIGHT: 152.2 LBS | DIASTOLIC BLOOD PRESSURE: 68 MMHG | TEMPERATURE: 97.3 F | RESPIRATION RATE: 16 BRPM | SYSTOLIC BLOOD PRESSURE: 130 MMHG | OXYGEN SATURATION: 100 % | HEART RATE: 68 BPM | BODY MASS INDEX: 28.74 KG/M2

## 2022-12-20 PROCEDURE — 99205 OFFICE O/P NEW HI 60 MIN: CPT | Mod: 25

## 2022-12-20 NOTE — HISTORY OF PRESENT ILLNESS
[de-identified] : Ms. Lizbeth Cody is 82 year old female with IDC of right breast here for consultation, referred by Dr. Omar Trejo. \par \par Patient with PMHx of hyperparathyroidism, osteoporosis, b12 and D deficiency, with screening mammogram noted below. \par \par normal mammogram . \par 2022  Mammogram/US\par A 1.2 cm suspicious mass in the 2:00 right breast, 3 cm from the nipple with a mammographic correlate, for which ultrasound-guided biopsy is recommended.\par ACR CATEGORY: BIR-4 - BI-RADS 4: SUSPICIOUS\par \par 22 Breast biopsy:\par A. Breast, Right, 2:00 o’clock, 3cm fn, Ultrasound guided biopsy:\par - Invasive ductal carcinoma, histologic grade 2,Nothingham score: 7/9 (T:3,N:2,M:1), measuring 12mm in a single core tissue and involving multiple cores\par Breast biomarkers (block A2):\par ER: Positive (%, moderate nuclear intensity staining)\par CT: Positive (%, moderate nuclear intensity staining)\par HER-2 by IHC: Equivocal (score 2+)\par Ki-67: 10-20%\par E-cadherin: Positive; supports the ductal phenotype\par P63 and Calponin: Negative\par \par Age at Menarche - 10\par Age at Menopause - 49\par Age at first pregnancy - 21\par Total number of pregnancies - 3\par Breast feeding - yes\par OC pills - none \par HRT - yes, couple of years\par \par FHx\par M. cousin with breast cancer in her 30s - \par \par Social History\par Smoked, quit in 1960s.\par Alcohol - no\par Illicit drugs - no\par Work -retired, teacher and  \par Lives with her  \par \par She has been on Prolia for two years, held since 2022 due to dental procedure in 2022. \par \par \par

## 2022-12-20 NOTE — PHYSICAL EXAM
[Ambulatory and capable of all self care but unable to carry out any work activities] : Status 2- Ambulatory and capable of all self care but unable to carry out any work activities. Up and about more than 50% of waking hours [Normal] : no JVD, no calf tenderness, venous stasis changes, varices [de-identified] : 9mm x 2mm firm nodule right breast upper inner quadrant below the biopsy site ecchymosis. NO additional palpable masses or axillary nodes bilaterally

## 2022-12-20 NOTE — ASSESSMENT
[FreeTextEntry1] : Right breast IDC\par Found on screening mammogram- 1.2 cm\par Clinical stage IA ([id number])\par ER (%) NC (%) positive, [id number] negative by FISH. KI67- 10-20%\par \par Prior labs, imaging, pathology reviewed, analyzed and discussed\par Discussed at length about the diagnosis, work up, staging, prognosis and treatment options\par Discussed about local therapy (surgery/radiation) vs systemic therapy\par Systemic therapy includes chemotherapy and endocrine therapy\par Given her ER/NC positivity, will strongly recommend endocrine therapy likely tamoxfien 20 mg x 10 years (give osteoporosis).\par regarding chemotherapy- will send genomic assay post op if tumor > 5 mm\par Depending on the results of genomic assay, will plan further care\par Follow up with Dr Harding to schedule surgery\par \par Osteoporosis\par Follows with Dr Hellerman\par On Prolia Q6M (9/2020-3/2022)\par Missed her dose in September due to dental surgery in July/August 2022\par Advised to follow up with dental for clearance post surgery\par DEXA (11/2022)-  Comparison is made to most recent previous at 2020 and reveals no statistically significant changes in the hip or wrist but a statistically significant increase bone mineral density of 9.2% in the\par spine which may reflect advancing DJD\par \par Social hx\par Lives in [place] with  (Juan Alberto Marino)\par Was  and \par Retired\par Smoked for a few years during college\par Occasional alcohol intake\par \par Patient had multiple questions which were answered to satisfaction\par \par Follow up post op\par No labs

## 2022-12-27 ENCOUNTER — NON-APPOINTMENT (OUTPATIENT)
Age: 82
End: 2022-12-27

## 2022-12-29 ENCOUNTER — APPOINTMENT (OUTPATIENT)
Dept: BREAST CENTER | Facility: CLINIC | Age: 82
End: 2022-12-29
Payer: MEDICARE

## 2022-12-29 VITALS
SYSTOLIC BLOOD PRESSURE: 141 MMHG | BODY MASS INDEX: 27.94 KG/M2 | WEIGHT: 148 LBS | OXYGEN SATURATION: 96 % | HEART RATE: 77 BPM | HEIGHT: 61 IN | DIASTOLIC BLOOD PRESSURE: 70 MMHG

## 2022-12-29 PROCEDURE — 99213 OFFICE O/P EST LOW 20 MIN: CPT

## 2022-12-29 NOTE — PHYSICAL EXAM
[No Axillary Lymphadenopathy] : no right axillary lymphadenopathy [de-identified] : Upper inner quadrant is healing well from the biopsy.

## 2022-12-29 NOTE — HISTORY OF PRESENT ILLNESS
[FreeTextEntry1] : 82-year-old postmenopausal woman with a family history of breast cancer presents after screening mammogram shows a right upper inner quadrant spiculated mass with architectural distortion that on ultrasound was seen is a 1.5 cm hypoechoic suspicious mass at the 2 o'clock position, 3 cm from the nipple.  Patient underwent a right ultrasound-guided core biopsy of the cork clip placement which revealed a moderately differentiated invasive ductal carcinoma that is ER/OK positive HER2 equivocal with a Ki-67 of 10 to 20%.  Patient denies any breast masses or bone pain.  Patient's maternal cousin with breast cancer at age 35.  Patient is status post left breast biopsy in the past.  Patient took hormone replacement therapy for a few years.\par \par Patient has been to radiation oncology consultation and comes in for a discussion.

## 2023-01-23 ENCOUNTER — NON-APPOINTMENT (OUTPATIENT)
Age: 83
End: 2023-01-23

## 2023-01-23 ENCOUNTER — APPOINTMENT (OUTPATIENT)
Dept: INTERNAL MEDICINE | Facility: CLINIC | Age: 83
End: 2023-01-23
Payer: MEDICARE

## 2023-01-23 VITALS
HEART RATE: 67 BPM | WEIGHT: 149 LBS | OXYGEN SATURATION: 96 % | HEIGHT: 61 IN | DIASTOLIC BLOOD PRESSURE: 70 MMHG | SYSTOLIC BLOOD PRESSURE: 110 MMHG | BODY MASS INDEX: 28.13 KG/M2

## 2023-01-23 DIAGNOSIS — Z87.898 PERSONAL HISTORY OF OTHER SPECIFIED CONDITIONS: ICD-10-CM

## 2023-01-23 DIAGNOSIS — I82.492 ACUTE EMBOLISM AND THROMBOSIS OF OTHER SPECIFIED DEEP VEIN OF LEFT LOWER EXTREMITY: ICD-10-CM

## 2023-01-23 DIAGNOSIS — Z01.818 ENCOUNTER FOR OTHER PREPROCEDURAL EXAMINATION: ICD-10-CM

## 2023-01-23 PROCEDURE — 93000 ELECTROCARDIOGRAM COMPLETE: CPT

## 2023-01-23 PROCEDURE — 99214 OFFICE O/P EST MOD 30 MIN: CPT

## 2023-01-24 LAB
ALBUMIN SERPL ELPH-MCNC: 4.4 G/DL
ALP BLD-CCNC: 56 U/L
ALT SERPL-CCNC: 15 U/L
ANION GAP SERPL CALC-SCNC: 8 MMOL/L
APTT BLD: 32.2 SEC
AST SERPL-CCNC: 14 U/L
BASOPHILS # BLD AUTO: 0.04 K/UL
BASOPHILS NFR BLD AUTO: 0.5 %
BILIRUB SERPL-MCNC: 0.4 MG/DL
BUN SERPL-MCNC: 15 MG/DL
CALCIUM SERPL-MCNC: 11.2 MG/DL
CHLORIDE SERPL-SCNC: 103 MMOL/L
CO2 SERPL-SCNC: 28 MMOL/L
CREAT SERPL-MCNC: 0.81 MG/DL
EGFR: 72 ML/MIN/1.73M2
EOSINOPHIL # BLD AUTO: 0.04 K/UL
EOSINOPHIL NFR BLD AUTO: 0.5 %
GLUCOSE SERPL-MCNC: 83 MG/DL
HCT VFR BLD CALC: 44.2 %
HGB BLD-MCNC: 14.5 G/DL
IMM GRANULOCYTES NFR BLD AUTO: 0.1 %
INR PPP: 1.05 RATIO
LYMPHOCYTES # BLD AUTO: 2.33 K/UL
LYMPHOCYTES NFR BLD AUTO: 29.5 %
MAN DIFF?: NORMAL
MCHC RBC-ENTMCNC: 31.7 PG
MCHC RBC-ENTMCNC: 32.8 GM/DL
MCV RBC AUTO: 96.5 FL
MONOCYTES # BLD AUTO: 0.67 K/UL
MONOCYTES NFR BLD AUTO: 8.5 %
NEUTROPHILS # BLD AUTO: 4.82 K/UL
NEUTROPHILS NFR BLD AUTO: 60.9 %
PLATELET # BLD AUTO: 201 K/UL
POTASSIUM SERPL-SCNC: 5.1 MMOL/L
PROT SERPL-MCNC: 6.6 G/DL
PT BLD: 12.2 SEC
RBC # BLD: 4.58 M/UL
RBC # FLD: 13.1 %
SODIUM SERPL-SCNC: 140 MMOL/L
WBC # FLD AUTO: 7.91 K/UL

## 2023-01-24 NOTE — ASSESSMENT
[Patient Optimized for Surgery] : Patient optimized for surgery [No Further Testing Recommended] : no further testing recommended [As per surgery] : as per surgery [FreeTextEntry4] : Ms. SUE  is a [] year-old female  with no significant history of coronary artery disease.  She  denies chest pain palpitations or shortness of breath and  Her EKG today is normal.  She  doesn't take blood thinners and will continue  Her  medications perioperatively as instructed.\par \par Ms. SUE  has a low/moderate risk for perioperative cardiovascular complications and will undergo the procedure as planned provided today's blood work is favorable.\par

## 2023-01-24 NOTE — PHYSICAL EXAM
[Normal] : normal gait, coordination grossly intact, no focal deficits [de-identified] : bilat  no gross lumps

## 2023-01-24 NOTE — HISTORY OF PRESENT ILLNESS
[No Pertinent Cardiac History] : no history of aortic stenosis, atrial fibrillation, coronary artery disease, recent myocardial infarction, or implantable device/pacemaker [No Pertinent Pulmonary History] : no history of asthma, COPD, sleep apnea, or smoking [No Adverse Anesthesia Reaction] : no adverse anesthesia reaction in self or family member [(Patient denies any chest pain, claudication, dyspnea on exertion, orthopnea, palpitations or syncope)] : Patient denies any chest pain, claudication, dyspnea on exertion, orthopnea, palpitations or syncope [Good (7-10 METs)] : Good (7-10 METs) [Chronic Anticoagulation] : no chronic anticoagulation [Chronic Kidney Disease] : no chronic kidney disease [Diabetes] : no diabetes [FreeTextEntry1] : R partial mastectomy [FreeTextEntry2] : 12/14 [FreeTextEntry3] : dr Harding [FreeTextEntry4] : Allison comes in for preoperative evaluation prior to the above surgery.  She has been diagnosed with invasive ductal carcinoma of the right breast and she is scheduled for right partial mastectomy with sentinel node biopsy on February 14 by Dr. Harding.  She feels fine.

## 2023-01-31 ENCOUNTER — APPOINTMENT (OUTPATIENT)
Dept: BREAST CENTER | Facility: CLINIC | Age: 83
End: 2023-01-31
Payer: MEDICARE

## 2023-01-31 VITALS
WEIGHT: 148 LBS | SYSTOLIC BLOOD PRESSURE: 140 MMHG | DIASTOLIC BLOOD PRESSURE: 72 MMHG | HEART RATE: 67 BPM | OXYGEN SATURATION: 100 % | BODY MASS INDEX: 27.96 KG/M2

## 2023-01-31 PROCEDURE — 99213 OFFICE O/P EST LOW 20 MIN: CPT

## 2023-01-31 NOTE — HISTORY OF PRESENT ILLNESS
[FreeTextEntry1] : 82-year-old postmenopausal woman with a family history of breast cancer presents after screening mammogram shows a right upper inner quadrant spiculated mass with architectural distortion that on ultrasound was seen is a 1.5 cm hypoechoic suspicious mass at the 2 o'clock position, 3 cm from the nipple.  Patient underwent a right ultrasound-guided core biopsy of the cork clip placement which revealed a moderately differentiated invasive ductal carcinoma that is ER/NH positive HER2 equivocal with a Ki-67 of 10 to 20%.  Patient denies any breast masses or bone pain.  Patient's maternal cousin with breast cancer at age 35.  Patient is status post left breast biopsy in the past.  Patient took hormone replacement therapy for a few years.\par \par Patient has been to second opinion with surgical oncology at [place] who recommended lumpectomy without sentinel node or radiation followed by adjuvant hormonal therapy.

## 2023-01-31 NOTE — PHYSICAL EXAM
[No Axillary Lymphadenopathy] : no right axillary lymphadenopathy [de-identified] : Upper inner quadrant is healing well from the biopsy.

## 2023-02-06 ENCOUNTER — NON-APPOINTMENT (OUTPATIENT)
Age: 83
End: 2023-02-06

## 2023-02-07 ENCOUNTER — RESULT REVIEW (OUTPATIENT)
Age: 83
End: 2023-02-07

## 2023-02-09 ENCOUNTER — NON-APPOINTMENT (OUTPATIENT)
Age: 83
End: 2023-02-09

## 2023-02-11 ENCOUNTER — RESULT REVIEW (OUTPATIENT)
Age: 83
End: 2023-02-11

## 2023-02-13 ENCOUNTER — RESULT REVIEW (OUTPATIENT)
Age: 83
End: 2023-02-13

## 2023-02-14 ENCOUNTER — RESULT REVIEW (OUTPATIENT)
Age: 83
End: 2023-02-14

## 2023-02-14 ENCOUNTER — APPOINTMENT (OUTPATIENT)
Dept: BREAST CENTER | Facility: HOSPITAL | Age: 83
End: 2023-02-14

## 2023-02-23 ENCOUNTER — APPOINTMENT (OUTPATIENT)
Dept: BREAST CENTER | Facility: CLINIC | Age: 83
End: 2023-02-23
Payer: MEDICARE

## 2023-02-23 VITALS
OXYGEN SATURATION: 98 % | SYSTOLIC BLOOD PRESSURE: 120 MMHG | DIASTOLIC BLOOD PRESSURE: 64 MMHG | HEART RATE: 76 BPM | WEIGHT: 148 LBS | BODY MASS INDEX: 27.96 KG/M2

## 2023-02-23 PROCEDURE — 99024 POSTOP FOLLOW-UP VISIT: CPT

## 2023-02-23 NOTE — HISTORY OF PRESENT ILLNESS
[FreeTextEntry1] : 2/23 right partial mastectomy with a sentinel node biopsy\par Moderately differentiated invasive ductal carcinoma, 14 mm, node-negative, ER/WA positive, HER2 negative with a Ki-67 of 5 to 10%, negative surgical margins.\par \par Patient tolerated the surgery well, pain under control.  Around this time her  was admitted to the hospital with a ruptured vessel by his pancreas and apparently he got quite sick but is now coming out of the intensive care unit.\par \par 82-year-old postmenopausal woman with a family history of breast cancer presents after screening mammogram shows a right upper inner quadrant spiculated mass with architectural distortion that on ultrasound was seen is a 1.5 cm hypoechoic suspicious mass at the 2 o'clock position, 3 cm from the nipple.  Patient underwent a right ultrasound-guided core biopsy of the cork clip placement which revealed a moderately differentiated invasive ductal carcinoma that is ER/WA positive HER2 equivocal with a Ki-67 of 10 to 20%.  Patient denies any breast masses or bone pain.  Patient's maternal cousin with breast cancer at age 35.  Patient is status post left breast biopsy in the past.  Patient took hormone replacement therapy for a few years.\par \par Patient has been to second opinion with surgical oncology at [place] who recommended lumpectomy without sentinel node or radiation followed by adjuvant hormonal therapy.

## 2023-02-24 ENCOUNTER — APPOINTMENT (OUTPATIENT)
Dept: HEMATOLOGY ONCOLOGY | Facility: CLINIC | Age: 83
End: 2023-02-24

## 2023-02-24 VITALS
RESPIRATION RATE: 16 BRPM | TEMPERATURE: 96.5 F | BODY MASS INDEX: 28.94 KG/M2 | DIASTOLIC BLOOD PRESSURE: 61 MMHG | SYSTOLIC BLOOD PRESSURE: 130 MMHG | HEART RATE: 58 BPM | HEIGHT: 61 IN | WEIGHT: 153.3 LBS | OXYGEN SATURATION: 99 %

## 2023-03-02 ENCOUNTER — TRANSCRIPTION ENCOUNTER (OUTPATIENT)
Age: 83
End: 2023-03-02

## 2023-03-03 ENCOUNTER — APPOINTMENT (OUTPATIENT)
Dept: HEMATOLOGY ONCOLOGY | Facility: CLINIC | Age: 83
End: 2023-03-03
Payer: MEDICARE

## 2023-03-03 VITALS
HEIGHT: 61 IN | DIASTOLIC BLOOD PRESSURE: 68 MMHG | HEART RATE: 76 BPM | OXYGEN SATURATION: 98 % | RESPIRATION RATE: 16 BRPM | BODY MASS INDEX: 28.83 KG/M2 | WEIGHT: 152.7 LBS | SYSTOLIC BLOOD PRESSURE: 135 MMHG | TEMPERATURE: 96.4 F

## 2023-03-03 PROCEDURE — 99215 OFFICE O/P EST HI 40 MIN: CPT | Mod: 25

## 2023-03-03 NOTE — ASSESSMENT
[FreeTextEntry1] : Right breast IDC\par Found on screening mammogram- 1.2 cm\par Clinical stage IA ([id number])\par ER (%) MD (%) positive, Gfz5tkg negative by FISH. KI67- 10-20%\par s/p right partial mastectomy with SNLB (2/23/23) with Dr Harding\par Pathological stage IA ([id number]).IDC, 14 mm, G2, 2 sentinel nodes negative\par Ndq5urf negative by FISH\par Mammaprint (done on biopsy)- Luminal A, low risk\par \par Discussed at length about the diagnosis, work up, staging, prognosis and treatment options\par Discussed about Systemic therapy includes chemotherapy and endocrine therapy\par Given her ER/MD positivity, will strongly recommend endocrine therapy likely tamoxfien 20 mg x 10 years (given osteoporosis).\par Discussed about mammaprint which was sent from biopsy which is cw low risk and no indication for chemotherapy, SHe was leaning against chemotherapy anyway and does not want to sent oncotype or repeat mammaprint in the final surgical specimen\par Discussed about radiation. She reports seeing Dr Timo Leon at [facility] who recommended against radiation. So she opts against radiation\par Plan\par Tamoxifen 20 mg QD x 5-10 years\par Monitor for VTE and annual Gyn exam\par Refer to wellness program\par \par Osteoporosis\par DEXA (11/2022)-  Comparison is made to most recent previous at 2020 and reveals no statistically significant changes in the hip or wrist but a statistically significant increase bone mineral density of 9.2% in the\par spine which may reflect advancing DJD\par Follows with Dr Hellerman\par On Prolia Q6M (9/2020-3/2022)\par Missed her dose in September due to dental surgery in July/August 2022\par Advised to follow up with dental for clearance post surgery and resume prolia\par \par Social hx\par Lives in [place] with  (Juan Alberto Marino)\par Was  and \par Retired\par Smoked for a few years during college\par Occasional alcohol intake\par \par Patient had multiple questions which were answered to satisfaction\par \par Follow up in 2-3 months\par CBC, CMP

## 2023-03-03 NOTE — PHYSICAL EXAM
[Ambulatory and capable of all self care but unable to carry out any work activities] : Status 2- Ambulatory and capable of all self care but unable to carry out any work activities. Up and about more than 50% of waking hours [Normal] : no JVD, no calf tenderness, venous stasis changes, varices [de-identified] : 9mm x 2mm firm nodule right breast upper inner quadrant below the biopsy site ecchymosis. NO additional palpable masses or axillary nodes bilaterally

## 2023-03-03 NOTE — PHYSICAL EXAM
[Ambulatory and capable of all self care but unable to carry out any work activities] : Status 2- Ambulatory and capable of all self care but unable to carry out any work activities. Up and about more than 50% of waking hours [Normal] : no JVD, no calf tenderness, venous stasis changes, varices [de-identified] : 9mm x 2mm firm nodule right breast upper inner quadrant below the biopsy site ecchymosis. NO additional palpable masses or axillary nodes bilaterally

## 2023-03-03 NOTE — HISTORY OF PRESENT ILLNESS
[de-identified] : Ms. Lizbeth Cody is 82 year old female with IDC of right breast here for consultation, referred by Dr. Omar Trejo. \par \par Patient with PMHx of hyperparathyroidism, osteoporosis, b12 and D deficiency, with screening mammogram noted below. \par \par normal mammogram . \par 2022  Mammogram/US\par A 1.2 cm suspicious mass in the 2:00 right breast, 3 cm from the nipple with a mammographic correlate, for which ultrasound-guided biopsy is recommended.\par ACR CATEGORY: BIR-4 - BI-RADS 4: SUSPICIOUS\par \par 22 Breast biopsy:\par A. Breast, Right, 2:00 o’clock, 3cm fn, Ultrasound guided biopsy:\par - Invasive ductal carcinoma, histologic grade 2,Nothingham score: 7/9 (T:3,N:2,M:1), measuring 12mm in a single core tissue and involving multiple cores\par Breast biomarkers (block A2):\par ER: Positive (%, moderate nuclear intensity staining)\par PA: Positive (%, moderate nuclear intensity staining)\par HER-2 by IHC: Equivocal (score 2+)\par Ki-67: 10-20%\par E-cadherin: Positive; supports the ductal phenotype\par P63 and Calponin: Negative\par \par Age at Menarche - 10\par Age at Menopause - 49\par Age at first pregnancy - 21\par Total number of pregnancies - 3\par Breast feeding - yes\par OC pills - none \par HRT - yes, couple of years\par \par FHx\par M. cousin with breast cancer in her 30s - \par \par Social History\par Smoked, quit in 1960s.\par Alcohol - no\par Illicit drugs - no\par Work -retired, teacher and  \par Lives with her  \par \par She has been on Prolia for two years, held since 2022 due to dental procedure in 2022. \par \par \par  [de-identified] : Patient is seen today for follow up\par \par She has been stressed as Her  (Juan Alberto Marino) has been at [facility] with ruptured pancreatic blood vessel, was in ICU for a while\par \par She is s/p right partial mastectomy with SNLB (2/23/23) with Dr Harding\par Pathology\par IDC, 14 mm, G2\par Pathological stage IA ([id number]). 2 sentinel nodes negative\par Sgk1imy negative by FISH\par

## 2023-03-03 NOTE — ASSESSMENT
[FreeTextEntry1] : Right breast IDC\par Found on screening mammogram- 1.2 cm\par Clinical stage IA ([id number])\par ER (%) NM (%) positive, Mdg1btl negative by FISH. KI67- 10-20%\par s/p right partial mastectomy with SNLB (2/23/23) with Dr Harding\par Pathological stage IA ([id number]).IDC, 14 mm, G2, 2 sentinel nodes negative\par Xnw5ify negative by FISH\par Mammaprint (done on biopsy)- Luminal A, low risk\par \par Discussed at length about the diagnosis, work up, staging, prognosis and treatment options\par Discussed about Systemic therapy includes chemotherapy and endocrine therapy\par Given her ER/NM positivity, will strongly recommend endocrine therapy likely tamoxfien 20 mg x 10 years (given osteoporosis).\par Discussed about mammaprint which was sent from biopsy which is cw low risk and no indication for chemotherapy, SHe was leaning against chemotherapy anyway and does not want to sent oncotype or repeat mammaprint in the final surgical specimen\par Discussed about radiation. She reports seeing Dr Timo Leon at [facility] who recommended against radiation. So she opts against radiation\par Plan\par Tamoxifen 20 mg QD x 5-10 years\par Monitor for VTE and annual Gyn exam\par Refer to wellness program\par \par Osteoporosis\par DEXA (11/2022)-  Comparison is made to most recent previous at 2020 and reveals no statistically significant changes in the hip or wrist but a statistically significant increase bone mineral density of 9.2% in the\par spine which may reflect advancing DJD\par Follows with Dr Hellerman\par On Prolia Q6M (9/2020-3/2022)\par Missed her dose in September due to dental surgery in July/August 2022\par Advised to follow up with dental for clearance post surgery and resume prolia\par \par Social hx\par Lives in [place] with  (Juan Alberto Marino)\par Was  and \par Retired\par Smoked for a few years during college\par Occasional alcohol intake\par \par Patient had multiple questions which were answered to satisfaction\par \par Follow up in 2-3 months\par CBC, CMP

## 2023-03-03 NOTE — HISTORY OF PRESENT ILLNESS
[de-identified] : Ms. Lizbeth Cody is 82 year old female with IDC of right breast here for consultation, referred by Dr. Omar Trejo. \par \par Patient with PMHx of hyperparathyroidism, osteoporosis, b12 and D deficiency, with screening mammogram noted below. \par \par normal mammogram . \par 2022  Mammogram/US\par A 1.2 cm suspicious mass in the 2:00 right breast, 3 cm from the nipple with a mammographic correlate, for which ultrasound-guided biopsy is recommended.\par ACR CATEGORY: BIR-4 - BI-RADS 4: SUSPICIOUS\par \par 22 Breast biopsy:\par A. Breast, Right, 2:00 o’clock, 3cm fn, Ultrasound guided biopsy:\par - Invasive ductal carcinoma, histologic grade 2,Nothingham score: 7/9 (T:3,N:2,M:1), measuring 12mm in a single core tissue and involving multiple cores\par Breast biomarkers (block A2):\par ER: Positive (%, moderate nuclear intensity staining)\par OK: Positive (%, moderate nuclear intensity staining)\par HER-2 by IHC: Equivocal (score 2+)\par Ki-67: 10-20%\par E-cadherin: Positive; supports the ductal phenotype\par P63 and Calponin: Negative\par \par Age at Menarche - 10\par Age at Menopause - 49\par Age at first pregnancy - 21\par Total number of pregnancies - 3\par Breast feeding - yes\par OC pills - none \par HRT - yes, couple of years\par \par FHx\par M. cousin with breast cancer in her 30s - \par \par Social History\par Smoked, quit in 1960s.\par Alcohol - no\par Illicit drugs - no\par Work -retired, teacher and  \par Lives with her  \par \par She has been on Prolia for two years, held since 2022 due to dental procedure in 2022. \par \par \par  [de-identified] : Patient is seen today for follow up\par \par She has been stressed as Her  (Juan Alberto Marino) has been at [facility] with ruptured pancreatic blood vessel, was in ICU for a while\par \par She is s/p right partial mastectomy with SNLB (2/23/23) with Dr Harding\par Pathology\par IDC, 14 mm, G2\par Pathological stage IA ([id number]). 2 sentinel nodes negative\par Oed4uyv negative by FISH\par

## 2023-03-10 ENCOUNTER — APPOINTMENT (OUTPATIENT)
Dept: INTERNAL MEDICINE | Facility: CLINIC | Age: 83
End: 2023-03-10

## 2023-03-29 ENCOUNTER — APPOINTMENT (OUTPATIENT)
Dept: ENDOCRINOLOGY | Facility: CLINIC | Age: 83
End: 2023-03-29
Payer: MEDICARE

## 2023-03-29 VITALS
BODY MASS INDEX: 28.32 KG/M2 | SYSTOLIC BLOOD PRESSURE: 112 MMHG | HEART RATE: 59 BPM | WEIGHT: 150 LBS | DIASTOLIC BLOOD PRESSURE: 82 MMHG | HEIGHT: 61 IN | OXYGEN SATURATION: 97 %

## 2023-03-29 PROCEDURE — 99214 OFFICE O/P EST MOD 30 MIN: CPT

## 2023-03-30 NOTE — HISTORY OF PRESENT ILLNESS
[FreeTextEntry1] : Mar 29, 2023    in person\par \par PCP:     Dr. Catie Mcnally\par             Gyn: Dr. Caity Reynolds\par             Urol: Dr. Uriel Alex (stones - one episode) \par             Oral surgeon: Dr. Nugent - dental implants\par             H/O:   Dr. Rosemary Juarez  - breast cancer -> [place]\par            .\par            CC: Osteoporosis (prev. Forteo): Fosamax Jan 2014 2/23/2018  CTX s 383  ionized Ca  5.9 (<5.6) [facility]\par                               1/31/2019:  no compression fractures.  Low vit D  \par                                12/18/2018:  BD [facility] Spine T -3.0, TH -1.4, FN -2.2, forearm -2.5\par                               11/8/19 hypercalcemia  Ca 10.5 (8.6 - 10.4) [facility]  CTXs 429  Vit D 30 (no PTH)\par                                   4/30/20 [facility]:  PTH 94;  CTXs 598\par                                12/21/2020:  BD [facility]  Spine T -3.1\par                   Thyroid nodule  2.3 cm L, FNAB 6/2017 [facility] - benign- low cellularity\par                   Kidney stones  10/2015 [facility] - asymptomatic - saw Dr. Alex\par                    Hypercalciuria   (5/2020 calcium 302 mg/w hrs at [facility])\par \par \par 83 yo - still goes to oral surgeon - had to skip a Prolia.\par Last saw end of July. \par Has occasional elevated PTH (US thyroid, sestamibi neg in 2018)\par Has history of possible asymptomatic kidney stone.\par \par : :\par Constitutional:  Alert, well nourished, healthy appearance, no acute distress \par Eyes:  No proptosis, no stare\par Thyroid:\par Pulmonary:  No respiratory distress, no accessory muscle use; normal rate and effort\par Cardiac:  Normal rate\par Vascular: \par Endocrine:  No stigmata of Cushing’s Syndrome\par Musculoskeletal:  Normal gait, no involuntary movements\par Neurology:  No tremors\par Affect/Mood/Psych:  Oriented x 3; normal affect, normal insight/judgement, normal mood \par .\par \par Imp:  Can restart the Prolia for now and will consider for Reclast eventually.\par \par \par \par \par \par \par \par Jun 08, 2022      iPhone\par \par PCP:     Dr. Catie Mcnally\par             Gyn: Dr. Caity Reynolds\par             Urol: Dr. Uriel Alex (stones - one episode) \par             Oral surgeon: Dr. Nugent - dental implants\par            .\par            CC: Osteoporosis (prev. Forteo): Fosamax Jan 2014 2/23/2018  CTX s 383  ionized Ca  5.9 (<5.6) [facility]\par                               1/31/2019:  no compression fractures.  Low vit D  \par                                12/18/2018:  BD Lin Spine T -3.0, TH -1.4, FN -2.2, forearm -2.5\par                               11/8/19 hypercalcemia  Ca 10.5 (8.6 - 10.4) [facility]  CTXs 429  Vit D 30 (no PTH)\par                                   4/30/20 [facility]:  PTH 94;  CTXs 598\par                                12/21/2020:  BD [facility]  Spine T -3.1\par                   Thyroid nodule  2.3 cm L, FNAB 6/2017 [facility] - benign- low cellularity\par                   Kidney stones  10/2015 [facility] - asymptomatic - saw Dr. Alex\par                    Hypercalciuria   (5/2020 calcium 302 mg/w hrs at [facility])\par \par \par Last Prolia 9/27/2021 and so next likely 3/27/2022 - Rx sent and she will also call\par Getting calcium in diet and taking 1000 iu vit D daily  \par \par Moste recent Prolia 3/28 and next is 9/28/22\par \par After htat will be 3/2023 so next visit around January 2023  \par \par \par \par Feb 02, 2022       iPhone (she resides at [facility])\par \par PCP:     Dr. Catie Mcnally\par             Gyn: Dr. Caity Reynolds\par             Urol: Dr. Uriel Alex (stones - one episode) \par             Oral surgeon: Dr. Nugent - dental implants\par            .\par            CC: Osteoporosis (prev. Forteo): Fosamax Jan 2014 2/23/2018  CTX s 383  ionized Ca  5.9 (<5.6) [facility]\par                               1/31/2019:  no compression fractures.  Low vit D  \par                                12/18/2018:  BD [facility] Spine T -3.0, TH -1.4, FN -2.2, forearm -2.5\par                               11/8/19 hypercalcemia  Ca 10.5 (8.6 - 10.4) [facility]  CTXs 429  Vit D 30 (no PTH)\par                                   4/30/20 [facility]:  PTH 94;  CTXs 598\par                                12/21/2020:  BD [facility]  Spine T -3.1\par                   Thyroid nodule  2.3 cm L, FNAB 6/2017 [facility] - benign- low cellularity\par                   Kidney stones  10/2015 [facility] - asymptomatic - saw Dr. Alex\par                    Hypercalciuria   (5/2020 calcium 302 mg/w hrs at [facility])\par \par \par Last Prolia 9/27/2021 and so next likely 3/27/2022 - Rx sent and she will also call\par Getting calcium in diet and taking 1000 iu vit D daily  \par \par Imp:  Doing well, no falls.\par Plan:  Next Prolia ~ 3/27/2022 followed by ~ 9/27/2022 and ROV here in 6/2022 and then\par more labs.  \par \par Sep 03, 2021        iPhone    at [place]\par \par PCP:     Dr. Catie Mcnally\par             Gyn: Dr. Caity Reynolds\par             Urol: Dr. Uriel Alex (stones - one episode) \par             Oral surgeon: Dr. Nugent - dental implants\par            .\par            CC: Osteoporosis (prev. Forteo): Fosamax Jan 2014 2/23/2018  CTX s 383  ionized Ca  5.9 (<5.6) [facility]\par                               1/31/2019:  no compression fractures.  Low vit D  \par                                12/18/2018:  BD [facility] Spine T -3.0, TH -1.4, FN -2.2, forearm -2.5\par                               11/8/19 hypercalcemia  Ca 10.5 (8.6 - 10.4) [facility]  CTXs 429  Vit D 30 (no PTH)\par                                   4/30/20 [facility]:  PTH 94;  CTXs 598\par                                12/21/2020:  BD [facility]  Spine T -3.1\par                   Thyroid nodule  2.3 cm L, FNAB 6/2017 [facility] - benign- low cellularity\par                   Kidney stones  10/2015 [facility] - asymptomatic - saw Dr. Alex\par                    Hypercalciuria   (5/2020 calcium 302 mg/w hrs at [facility])\par \par Visits for osteoporosis (also thyroid nodule, hypercalciuria, hyperparathyroid - mostly normocalcemic, neg sestamibi; Hx low vitamin D)\par Recently received second Pfizer Covid vaccine.\par \par 6/3/2021 labs at [place] included\par calcium 10.7 (< 10.5)\par PTH 43  (had been 71 in October 2020 when calcium was 9.7)\par \par Treated with Forteo followed by alendronate.   After holiday from alendronate, started on Prolia #1 on Feb 19, 2021\par She is on vit D 1000 iu daily.   Prolia #2 could have been Aug 19.  \par \par Imp: Osteoporosis of spine.\par    Next BD late Dec 2022\par   Prolia #2 can be now after updated vitamin D level.\par   ROV within 5 months\par \par \par \par \par Feb 11, 2021   phone i     [phone] \par \par PCP:     Dr. Catie Mcnally\par             Gyn: Dr. Caity Reynolds\par             Urol: Dr. Uriel Alex (stones - one episode) \par             Oral surgeon: Dr. Nugent - dental implants\par            .\par            CC: Osteoporosis (prev. Forteo): Fosamax Jan 2014 2/23/2018  CTX s 383  ionized Ca  5.9 (<5.6) [facility]\par                               1/31/2019:  no compression fractures.  Low vit D  \par                                12/18/2018:  BD [facility] Spine T -3.0, TH -1.4, FN -2.2, forearm -2.5\par                               11/8/19 hypercalcemia  Ca 10.5 (8.6 - 10.4) [facility]  CTXs 429  Vit D 30 (no PTH)\par                   Thyroid nodule  2.3 cm L, FNAB 6/2017 [facility] - benign- low cellularity\par                   Kidney stones  10/2015 [facility] - asymptomatic - saw Dr. Alex\par                    Hypercalciuria\par \par Visits for osteoporosis (also thyroid nodule, hyperparathroid)\par Recenty received second Pfizer Covid vaccine.\par \par Has not required any oral surgery in past few years.\par She used to have low vitamin D, now takes 1000 iu daily\par Spine T -3.1, Z -0.6.   T down from previus T of -3.0\par \par Impression:  Not good candidate for parathyroid surgery.\par Plan:  Consider for Prolia  \par \par \par \par Nov 25, 2020    in person\par \par PCP:     Dr. Catie Mcnally\par             Gyn: Dr. Caity Reynolds\par             Urol: Dr. Uriel Alex (stones - one episode) \par             Oral surgeon: Dr. Nugent - dental implants\par            .\par            CC: Osteoporosis (prev. Forteo): Fosamax Jan 2014 2/23/2018  CTX s 383  ionized Ca  5.9 (<5.6) [facility]\par                               1/31/2019:  no compression fractures.  Low vit D  \par                                12/18/2018:  BD [facility] Spine T -3.0, TH -1.4, FN -2.2, forearm -2.5\par                               11/8/19 hypercalcemia  Ca 10.5 (8.6 - 10.4) [facility]  CTXs 429  Vit D 30 (no PTH)\par                   Thyroid nodule  2.3 cm L, FNAB 6/2017 [facility] - benign- low cellularity\par                   Kidney stones  10/2015 [facility] - asymptomatic - saw Dr. Alex\par                    Hypercalciuria\par \par Saw her oral surgeon, Dr. Nugent, in the Spring.   No tooth extractions or dental implants planned in foreseeable future.\par 4/30/20  [facility] included calcium 10.1 PTH 94\par osteocalcin 27\par \par vit D 30\par 1,25 82 (18-72)\par 24 hour urine calcium 255,   creat 0.84 gm\par \par : :\par Constitutional:  Alert, well nourished, healthy appearance, no acute distress \par Eyes:  No proptosis, no lid lag\par Thyroid:\par Pulmonary:  No respiratory distress, no accessory muscle use; normal rate and effort\par Cardiac:  Normal rate\par Vascular: \par Endocrine:  No stigmata of Cushing’s Syndrome\par Musculoskeletal:  Normal gait, no involuntary movements\par Neurology:  No tremors\par Affect/Mood/Psych:  Oriented x 3; normal affect, normal insight/judgement, normal mood \par .\par \par Impression:  Osteoporosis of spine.    No compression or other fractures.\par Hypercalciuria with normocalcemic hyperparathyroidism.  \par \par Impression:  She is eligible for follow up bone density after December 18, 2020 and will likely go for\par Prolia injection after that.  \par \par \par October 20, 2020 labs at [place] included\par TSH 1.99\par B12 491\par PTH 71 (15-65)\par calcium 9.7  \par \par \par \par \par May 05, 2020  Telephone Visit\par \par PCP:     Dr. ZEB Saleh\par             Gyn: Dr. Caity Reynolds\par             Urol: Dr. Uriel Alex (stones - one episode) \par             Oral surgeon: Dr. Nugent - dental implants\par            .\par            CC: Osteoporosis (prev. Forteo): Fosamax Jan 2014 2/23/2018  CTX s 383  ionized Ca  5.9 (<5.6) [facility]\par                               1/31/2019:  no compression fractures.  Low vit D  \par                                6/27/19 BD [facility] Spine T -3.0, TH -1.4, FN -2.2, forearm -2.5\par                               11/8/19 hypercalcemia  Ca 10.5 (8.6 - 10.4) [facility]  CTXs 429  Vit D 30 (no PTH)\par                   Thyroid nodule  2.3 cm L, FNAB 6/2017 [facility] - benign- low cellularity\par                   Kidney stones  10/2015 [facility] - asymptomatic \par \par No history of fracture.  Spine T score -3.0  12/18/2018\par \par Recent labs include\par 24 hour urine calcium of 255 mg\par 25 OH vit D 30 on ~1000 iu daily.\par PTH 94 (<94)\par \par Labs at [facility] 11/8/2019 included\par calcium 10.5\par \par \par \par Impression:  Trajectory of spine bone density previously improved, but appears to have plateaued and perhaps may be heading back down.  Not an ideal candidate for PTX.   May consider Prolia in the future.  \par \par Impression[facility]  [facility]  \par \par \par Nov 12, 2019\par \par PCP:     Dr. ZEB Saleh\par             Gyn: Dr. Caity Reynolds\par             Urol: Dr. Uriel Alex (stones - one episode) \par             Oral surgeon: Dr. Nugent - dental implants\par            .\par            CC: Osteoporosis (prev. Forteo): Fosamax Jan 2014 2/23/2018  CTX s 383  ionized Ca  5.9 (<5.6) [facility]\par                               1/31/2019:  no compression fractures.  Low vit D  \par                                6/27/19 BD [facility] Spine T -3.0, TH -1.4, FN -2.2, forearm -2.5\par                               11/8/19 hypercalcemia  Ca 10.5 (8.6 - 10.4) [facility]  CTXs 429  Vit D 30 (no PTH)\par                   Thyroid nodule  2.3 cm L, FNAB 6/2017 [facility] - benign- low cellularity\par                   Kidney stones  10/2015 [facility] - asymptomatic \par \par \par #  Will ask her to have f/u US thyroid within next year.\par \par #  Osteoporos of spine \par         Has been on "holiday" from alendronate.  \par \par         Impression:  Previously diagnosed with normocalcemic hyperparathryoidism; however, most recent\par           [facility] calcium slightly elevated at 10.5, normal up to 10.4, with normal renal function and\par          25 OH vitmin D level of 30  (but no PTH)\par \par           X-ray spine neg:\par               Lumbosacral Spine.\par \par Frontal, lateral and cone-down projections demonstrate satisfactory alignment of the bony structures. No fracture or vertebral compression can be appreciated. There is no evidence of pedicle destruction. Intervertebral disc spaces are satisfactorily maintained. The sacroiliac joints are symmetric. As compared to the patient's previous study of 9/29/2014, there has been no significant change of an adverse nature.\par \par \par IMPRESSION: No evidence of lumbar compression deformity. No significant change from 9/29/2014.\par \par \par ***Electronically Signed ***\par -----------------------------------------------\par Wojciech Biggs MD              01/31/19 1454\par \par \par IMPRESSION:\par \par No focal site of initial increased and persistent sestamibi tracer localization is evident to specifically suggest uptake in parathyroid adenoma or hyperplasia.\par \par The left thyroid lobe is asymmetrically much larger than the right with much more intense tracer localization.\par \par \par \par \par ***Electronically Signed ***\par -----------------------------------------------\par Donovan Hollis MD              12/06/18 \par \par Imp:  Sestamibi negative.      \par Plan:  24 hour Ca at [facility] with PTH, vit D before next visit in 6 months - will likely decide on\par Prolia at that time; however, will also reconsider evaluation of parathyroid.  \par \par \par \par \par June 27, 2019\par \par PCP:     Dr. ZEB Saleh (to see) ******\par             Gyn: Dr. Caity Reynolds\par             Urol: Dr. Uriel Alex (stones - one episode) \par             Oral surgeon: Dr. Nugent - dental implants\par            .\par            CC: Osteoporosis (prev. Forteo): Fosamax Jan 2014\par \par 79 yo on "holiday" from Fosamax.  \par Lab tests from yesterday at [facility] include \par TSH 1.81\par 25 OH vit D 24\par \par Lumbar spine, L1 to L2, total: 0.647 gm./cm.2.\par \par T-score: -3.0\par \par Z-score: -0.6\par \par Left hip: 0.767 gm./cm.2.\par \par T-score: -1.4 \par \par Z-score: 0.6\par \par Left femoral neck: 0.606\par \par T score: -2.2\par \par Z score: 0.1\par \par  Left forearm, distal third: 0.544\par \par T score: -2.5\par \par Z score: 0.5\par \par On holiday from alendronate.\par \par Last dental implant a few weeks ago.   After a bone graft !\par No other implants on the horizon.\par Plan:  Updated labs late Nov, ROV Dec.\par Likely Prolia next time around  \par \par January 31, 2019\par \par PCP: Dr. Supriya Segovia/Dr. Wojciech Lee\par             Gyn: Dr. Caity Reynolds\par             Urol: Dr. Uriel Alex (stones - one episode) \par             Oral surgeon: Dr. Nugent - dental implants\par            .\par            CC: Osteoporosis (prev. Forteo): Fosamax Jan 2014\par             Last BD: 12/12/2016\par             Asymptomatic kidney stones **\par             Hypercalciuria **\par             (Lyme )\par             History of partial thyroid lobectomy;\par             Thyroid nodules (sono 12/12/2016)\par             -FNAB 6/7/17 [facility] of 2.3 cm L nodule: neg \par             History of I-131 for hyperthyroidism\par             new: DVT LLE 6/2017.\par            " IMPRESSION: No significant change in the appearance of the thyroid since 12/12/2016, including a \par            previously biopsied left lower pole nodule, and an hypoechoic nodule at the posterior aspect of the \par            left upper pole that could be either exophytic or extrathyroidal (parathyroid). In view of the \par            history of hyperparathyroidism, follow-up radionuclide parathyroid scintigraphy can be performed \par            for further more specific evaluation in this regard. \par            ***Electronically Signed *** \par            ----------------------------------------------- \par            Donovan Hollis MD 05/15/18 \par \par \par Recent bone density shows spine T -3.1 and trending down.\par Ultrasound thyroid, no significant change since 12/12/2016 including previously biopsied nodule.  \par Sestamibi parathyroid scan : negative  \par Recent [facility] ionized calcium 5.7 (4.9 - 5.6) **\par PTH 73 (14-64) \par CTXs  653\par osteocalcin 24\par phos 3.3\par TSH 2.88\par vit D 28\par \par No compression fx in 2014.\par \par Imp:  Normocalcemic hyperparathyroid.\par Would benefit from antiresorptive.\par Consider Evenity when available.\par ROV 6 months\par \par \par Previous notes from [employer] appended below.\par \par  June 22, 2018\par            .\par            PCP: Dr. Supriya Segovia/Dr. Wojciech Lee\par             Gyn: Dr. Caity Reynolds\par             Urol: Dr. Uriel Alex (stones - one episode) \par             Oral surgeon: Dr. Nugent - dental implants\par            .\par            CC: Osteoporosis (prev. Forteo): Fosamax Jan 2014\par             Last BD: 12/12/2016\par             Asymptomatic kidney stones **\par             Hypercalciuria **\par             (Lyme )\par             History of partial thyroid lobectomy;\par             Thyroid nodules (sono 12/12/2016)\par             -FNAB 6/7/17 [facility] of 2.3 cm L nodule: neg \par             History of I-131 for hyperthyroidism\par             new: DVT LLE 6/2017.\par            " IMPRESSION: No significant change in the appearance of the thyroid since 12/12/2016, including a \par            previously biopsied left lower pole nodule, and an hypoechoic nodule at the posterior aspect of the \par            left upper pole that could be either exophytic or extrathyroidal (parathyroid). In view of the \par            history of hyperparathyroidism, follow-up radionuclide parathyroid scintigraphy can be performed \par            for further more specific evaluation in this regard. \par            ***Electronically Signed *** \par            ----------------------------------------------- \par            Donovan Hollis MD 05/15/18 \par            .\par            .\par            Impression: Based on above, I will ask her to stop by [facility] for sestamibi parathroid scan and to return after that.\par            can then decide on when and if to start another round of treatment for the osteoporosis. \par            .\par            ==\par            .\par            Feb 2, 2018\par            .\par            PCP: Dr. Supriya Segovia/Dr. Wojciech Lee\par             Gyn: Dr. Caity Reynolds\par             Urol: Dr. Uriel Alex (stones - one episode) \par             Oral surgeon: Dr. Nugent - dental implants\par            .\par            CC: Osteoporosis (prev. Forteo): Fosamax Jan 2014\par             Last BD: 12/12/2016\par             Asymptomatic kidney stones **\par             Hypercalciuria **\par             (Lyme )\par             History of partial thyroid lobectomy;\par             Thyroid nodules (sono 12/12/2016)\par             -FNAB 6/7/17 [facility] of 2.3 cm L nodule: neg \par             History of I-131 for hyperthyroidism\par             new: DVT LLE 6/2017.\par            .\par            Stopped a/c two weeks ago.\par            Stopped Fosamax end of Dec 2017 after 4 years. \par            . \par            Labs ([facility]) from \par            1/23/2018\par            TSH 2.66\par            calcium 10.3\par            ionized calcium 5.9 (4.8 - 5.6) ***\par            phos 3.4\par            PTH 64 (14-64) ***\par            25 OH vit D\par            CTXs 383 ****\par            .\par            Impression: Took two years of Forteo, 4 years alendronate, started "holiday" recently, has dental imiplants, eligible for BD Dec 2018 and will likely benefit from:\par            -sestamibi parathyroid scan\par            -Prolia after next BD\par             - need fu u/s thyroid around April and RDV May. \par            .\par            ==\par            .\par            Sept 15, 2017\par            .\par            PCP: Dr. Supriya Segovia\par             Gyn: Dr. Caity Reynolds\par             Urol: Dr. Uriel Alex (stones - one episode) \par            .\par            CC: Osteoporosis (prev. Forteo): Fosamax Jan 2014\par             Last BD: 12/12/2016\par             Asymptomatic kidney stones **\par             Hypercalciuria **\par             (Lyme )\par             History of partial thyroid lobectomy;\par             Thyroid nodules (sono 12/12/2016)\par             History of I-131 for hyperthyroidism\par             new: DVT LLE 6/2017.\par            .\par            Went for FNAB thyroid nodule at [facility] - cytology read as benign\par            .\par            Bone density stable and December 2017 she will have been on Fosamax for 4 years, so will advise "holiday".\par            .\par            Now on Xaralto because of L DVT:\par            .\par            .\par            "6/23/2017\par            IMPRESSION: Positive DVT study with thrombus extending from the posterior tibial vein into the popliteal vein."\par            .\par            My impression: \par            Labs at [facility] on \par            9/11/2017 included \par            calcium 10.2 (8.6-10.4)\par            phos 3.2\par            PTH 73 (14-64)\par            TSH 2.55 \par            25 OH vit D 28\par            CTXs 282 (on alendronate) \par            .\par            Impression: \par            FNAB thyroid at [facility] (after first at [place] [facility]") reassuring.\par            .\par             Normocalcemic hyperparathyroidism.\par            Bone density stable.\par            Can go on "holiday" from alendronate.\par            Monitor calcium/PTH\par            ROV February. \par            Next BD ~Dec 2019\par            .\par            ==\par            .\par            May 5, 2017\par            .\par            PCP: Dr. Supriya Segovia\par             Gyn: Dr. Caity Reynolds\par             Urol: Dr. Uriel Alex (stones - one episode) \par            .\par            CC: Osteoporosis (prev. Forteo): Fosamax Jan 2014\par             Last BD: 12/12/2016\par             Asymptomatic kidney stones **\par             Hypercalciuria **\par             (Lyme )\par             History of partial thyroid lobectomy;\par             Thyroid nodules (sono 12/12/2016)\par             History of I-131 for hyperthyroidism\par             .\par            .\par            Imp/Plan:\par            .\par            Biopsy of dominant thyroid nodule at [place] was not successful.\par            Went to [facility] for sonogram and Dr. Phipps also advised FNAB so will ask her to try there. \par            .\par            Bone density seems to have improved on Fosamax. End of this year will be four years and a reasonable time to consider a "holiday" if she and the rest of her team agree.\par            .\par            Monitor vit D and PTH level.\par            ROV in September. \par            .\par            .\par            .\par            ==\par            .\par            March 3, 2017\par            .\par            PCP: Dr. Supriya Segovia\par             Gyn: Dr. Caity Reynolds\par            .\par            CC: Osteoporosis (prev. Forteo): Fosamax Jan 2014\par             Last BD: 12/12/2016\par             Asymptomatic kidney stones \par             Hypercalciuria \par             (Lyme )\par             History of partial thyroid lobectomy;\par             Thyroid nodules (sono 12/12/2016)\par             History of I-131 for hyperthyroidism\par            .\par            Visited [facility] [facility] and [facility]. \par            .\par            Labs ([facility])\par            24 hour urine collection \par            1 g creatinine \par            calcium 287 (<250)\par            .\par            creatinine 0.81\par            uric acid 4.2\par            serum calcium 10.4 (8.6-10.4) \par            PTH 60 \par            25 OH vit D 27 \par            CTXs 278\par            .\par            Bone density stable\par            Teeth stable\par            Thyroid sono advises FNAB of nodule (she is s/p partial lobectomy in [facility] and [place] for hyperthyroidism several years ago)\par            .\par            Impression: Because of evidence of possible normocalcemic hyperparathyroidism, she went for ultrasound of thyroid/parathyroid and nodules detected with recommendation for FNAB \par            .\par            Osteoporosis currently stable. \par            .\par            Plan: ROV in two months (after FNAB of thyroid nodule)\par            .\par            ==\par            .;\par            October 7, 2016\par            .\par            PCP: Dr. Supriya Segovia\par             Gyn: Dr. Caity Reynolds\par            .\par            CC: Osteoporosis (prev. Forteo): Fosamax Jan 2014\par             (Lyme last \par            .\par            Dental issues are quiet\par            Remains on alendronate weekly.\par            .\par            Blood tests ([facility]) from \par            3/18/2016 inclued\par            BS 87 mg/dl\par            calcium 9.1\par            phos 3.1\par            TSH 2.28 \par            Vit B12 367\par            PTH 78 (14-65) \par            creat 0.79\par            vit D 35\par            ionized calcium - normal \par            .\par            Did not yet go for BD.\par            For problem with urination saw Uriel at [place]. \par            sono showed bilateral tiny kidney stones. \par            stopped calcium supp after that. \par            (mother had stones)\par            .\par            Plan: sono thyroid\par            repeat PTH\par            24 hour urine ca/stone former\par            bone density\par            ROV Feb\par            continue Fosamax\par            .\par            ==\par            .\par            March 18, 2016\par            .\par            PCP: Dr. Supriya Segovia\par             Gyn: Dr. Caity Reynolds\par            .\par            CC: Osteoporosis (prev. Forteo): Fosamax Jan 2014\par             (Lyme last \par            .\par            Had bone grafting lower part of her mouth - last September.\par            .\par            Plan: Updated labs now.\par            ROV late October after BD\par            .\par            ==\par            .\par            June 5, 2015\par            .\par            PCP: Dr. Supriya Segovia\par             Gyn: Dr. Caity Reynolds\par            .\par            CC: Osteoporosis (prev. Forteo): Fosamax Jan 2014\par             (Lyme last \par            .\par            [facility]\par            .\par            Recent [facility] labs show 25 OH vit 41\par            CTXs 148\par            ionized calcium 5.7 (4.8-5.6) \par            .\par            Impression: Took Forteo for two years and has been on Fosamax since Jan 2014.\par            .\par            Impression: Dental issues are quiet. Serial bone densities show considerable improvement over time, especially in her spine. Last BD Sept 2014 showed spine T -2.6 Z -0.3.\par            .\par            Plan: Continue Fosamax for 3-5 years.\par            .Next BD ~ Oct 2016.\par            ROV here in March.\par            .\par            .\par            ===\par            October 28, 2014\par            PCP: Dr. Supriya Segovia\par            CC: Osteoporosis (prev. Forteo)\par            .\par            Treated for Lyme over the summer.\par            On Fosamax since Jan 2014.\par            April 2014 CTXs 192 (10/2013 CTXs 341).\par             vit D 36\par            .\par            Much of improvement in bone density was likely from the Forteo and she is now holding her own with Fosamax. \par            .\par            Dental implant fine.\par            .\par            Plan: Continue the Fosamax for up to 3 years and then consider Prolia.\par            ROV six months. \par            .\par            ===\par            April 8, 2014\par            PCP: Dr. Supriya Segovia\par            CC: Osteoporosois\par            .\par            Took Forteo for two years.\par            Because of dental issues preferred to take Calcitonin after that.\par            Switched to Fosamax around Januiary 2014.\par            Notes no symptoms from Fosamax.\par            Her densist, Dr. Nugent, is pleased with her progress.\par            She goes for teeth cleaning every 3 months.\par            .\par            Impression: Markers of bone turnover have decreased on Fosamax, predicting absorption and effectiveness.\par            .\par            Plan: Same Rx. Follow up BD two years after last. \par            .\par            .\par            ====\par            October 28, 2013\par            PCP: Dr. Supriya Segovia\par            CC: Osteoporosis (occasion hypercalcemia); previous Forteo\par            .\par            Blood tests at [facility] in July showed calcium 10.3, PTH 47\par            CTXs 342. \par            BSA 18.1 (14.2 - 42.7)\par            .\par            Labs from today still pending.\par            Dr. Nugent said: "Dr. Hellerman can do whatever he wants now."\par            Has a 3 month supply of calcitonin. \par            Plan: After finish\par            .\par            ========\par            March 26, 2013 Returns for osteoporosis. Has not yet received approval from Dr. Nugent to have more aggressive treatment than calcitonin (such as Fosamax, Reclast, or Prolia). She will be seeing Dr. Nugent within 3 months and will get his approval. In the meatime, she will stay on the calcitonin nasal spray. \par            .\par            Had blood tests last week at [facility] in [place]. \par  \par  ROS:  \par  \par

## 2023-04-02 LAB
25(OH)D3 SERPL-MCNC: 33.4 NG/ML
ANION GAP SERPL CALC-SCNC: 12 MMOL/L
BUN SERPL-MCNC: 14 MG/DL
CALCIUM SERPL-MCNC: 10.9 MG/DL
CHLORIDE SERPL-SCNC: 105 MMOL/L
CO2 SERPL-SCNC: 24 MMOL/L
CREAT SERPL-MCNC: 0.78 MG/DL
EGFR: 75 ML/MIN/1.73M2
GLUCOSE SERPL-MCNC: 79 MG/DL
POTASSIUM SERPL-SCNC: 4.7 MMOL/L
SODIUM SERPL-SCNC: 141 MMOL/L

## 2023-04-05 LAB
COLLAGEN CTX SERPL-MCNC: 459 PG/ML
OSTEOCALCIN SERPL-MCNC: 10.5 NG/ML

## 2023-05-19 ENCOUNTER — APPOINTMENT (OUTPATIENT)
Dept: HEMATOLOGY ONCOLOGY | Facility: CLINIC | Age: 83
End: 2023-05-19
Payer: MEDICARE

## 2023-05-19 ENCOUNTER — RESULT REVIEW (OUTPATIENT)
Age: 83
End: 2023-05-19

## 2023-05-19 VITALS
HEART RATE: 61 BPM | BODY MASS INDEX: 29.11 KG/M2 | WEIGHT: 154.19 LBS | TEMPERATURE: 96.1 F | RESPIRATION RATE: 18 BRPM | OXYGEN SATURATION: 97 % | HEIGHT: 61 IN | DIASTOLIC BLOOD PRESSURE: 59 MMHG | SYSTOLIC BLOOD PRESSURE: 113 MMHG

## 2023-05-19 PROCEDURE — 36415 COLL VENOUS BLD VENIPUNCTURE: CPT

## 2023-05-19 PROCEDURE — 99214 OFFICE O/P EST MOD 30 MIN: CPT | Mod: 25

## 2023-05-19 NOTE — PHYSICAL EXAM
[Ambulatory and capable of all self care but unable to carry out any work activities] : Status 2- Ambulatory and capable of all self care but unable to carry out any work activities. Up and about more than 50% of waking hours [Normal] : no JVD, no calf tenderness, venous stasis changes, varices [de-identified] : 9mm x 2mm firm nodule right breast upper inner quadrant below the biopsy site ecchymosis. NO additional palpable masses or axillary nodes bilaterally

## 2023-05-19 NOTE — ASSESSMENT
[FreeTextEntry1] : Right breast IDC\par Found on screening mammogram- 1.2 cm\par Clinical stage IA ([id number])\par ER (%) NC (%) positive, Fyv0yar negative by FISH. KI67- 10-20%\par s/p right partial mastectomy with SNLB (2/23/23) with Dr Harding\par Pathological stage IA ([id number]).IDC, 14 mm, G2, 2 sentinel nodes negative\par Qyo2ygx negative by FISH\par Mammaprint (done on biopsy)- Luminal A, low risk\par She saw Dr Timo Leon at [facility] who recommended against radiation. So she opts against radiation\par \par Started tamoxifen (3/20/23 - present)\par Her  (Juan Alberto Marino) who was at [facility] with ruptured pancreatic blood vessel, is now recovering gradually\par She delayed starting tamoxifen until after her  was stable\par Saw Dr Hellerman for osteoporosis-> had oral surgery end of JUly 2022 and was advised not to take prolia \par Plan\par Tamoxifen 20 mg QD x 5-10 years\par Monitor for VTE and annual Gyn exam\par \par Osteoporosis\par DEXA (11/2022)-  Comparison is made to most recent previous at 2020 and reveals no statistically significant changes in the hip or wrist but a statistically significant increase bone mineral density of 9.2% in the\par spine which may reflect advancing DJD\par Follows with Dr Hellerman\par On Prolia Q6M (9/2020-3/2022)\par Missed her dose in September due to dental surgery in July/August 2022\par Advised to follow up with dental for clearance post surgery and resume prolia\par \par Social hx\par Lives in [place] with  (Juan Alberto Marino)\par Was  and \par Retired\par Smoked for a few years during college\par Occasional alcohol intake\par \par Patient had multiple questions which were answered to satisfaction\par \par Follow up in 3 months\par CBC, CMP

## 2023-05-19 NOTE — HISTORY OF PRESENT ILLNESS
[de-identified] : Ms. Lizbeth Cody is 82 year old female with IDC of right breast here for consultation, referred by Dr. Omar Trejo. \par \par Patient with PMHx of hyperparathyroidism, osteoporosis, b12 and D deficiency, with screening mammogram noted below. \par \par normal mammogram . \par 2022  Mammogram/US\par A 1.2 cm suspicious mass in the 2:00 right breast, 3 cm from the nipple with a mammographic correlate, for which ultrasound-guided biopsy is recommended.\par ACR CATEGORY: BIR-4 - BI-RADS 4: SUSPICIOUS\par \par 22 Breast biopsy:\par A. Breast, Right, 2:00 o’clock, 3cm fn, Ultrasound guided biopsy:\par - Invasive ductal carcinoma, histologic grade 2,Nothingham score: 7/9 (T:3,N:2,M:1), measuring 12mm in a single core tissue and involving multiple cores\par Breast biomarkers (block A2):\par ER: Positive (%, moderate nuclear intensity staining)\par PA: Positive (%, moderate nuclear intensity staining)\par HER-2 by IHC: Equivocal (score 2+)\par Ki-67: 10-20%\par E-cadherin: Positive; supports the ductal phenotype\par P63 and Calponin: Negative\par \par Age at Menarche - 10\par Age at Menopause - 49\par Age at first pregnancy - 21\par Total number of pregnancies - 3\par Breast feeding - yes\par OC pills - none \par HRT - yes, couple of years\par \par FHx\par M. cousin with breast cancer in her 30s - \par \par Social History\par Smoked, quit in 1960s.\par Alcohol - no\par Illicit drugs - no\par Work -retired, teacher and  \par Lives with her  \par \par She has been on Prolia for two years, held since 2022 due to dental procedure in 2022. \par \par She is s/p right partial mastectomy with SNLB (23) with Dr Harding\par Pathology\par IDC, 14 mm, G2\par Pathological stage IA ([id number]). 2 sentinel nodes negative\par Ykn5nmh negative by FISH\par \par  [de-identified] : Patient is seen today for follow up\par Started tamoxifen (3/20/23 - present)\par \par Her  (Juan Alberto Marino) who was at [facility] with ruptured pancreatic blood vessel, is now recovering gradually\par She delayed starting tamoxifen until after her  was stable\par Saw Dr Hellerman for osteoporosis-> had oral surgery end of JUly 2022 and was advised not to take prolia

## 2023-08-02 ENCOUNTER — NON-APPOINTMENT (OUTPATIENT)
Age: 83
End: 2023-08-02

## 2023-08-10 DIAGNOSIS — I26.99 OTHER PULMONARY EMBOLISM W/OUT ACUTE COR PULMONALE: ICD-10-CM

## 2023-08-25 ENCOUNTER — RESULT REVIEW (OUTPATIENT)
Age: 83
End: 2023-08-25

## 2023-08-25 ENCOUNTER — APPOINTMENT (OUTPATIENT)
Dept: HEMATOLOGY ONCOLOGY | Facility: CLINIC | Age: 83
End: 2023-08-25
Payer: MEDICARE

## 2023-08-25 VITALS
RESPIRATION RATE: 16 BRPM | HEIGHT: 61 IN | WEIGHT: 148.19 LBS | OXYGEN SATURATION: 97 % | TEMPERATURE: 97.9 F | HEART RATE: 76 BPM | BODY MASS INDEX: 27.98 KG/M2 | DIASTOLIC BLOOD PRESSURE: 53 MMHG | SYSTOLIC BLOOD PRESSURE: 119 MMHG

## 2023-08-25 DIAGNOSIS — K59.00 CONSTIPATION, UNSPECIFIED: ICD-10-CM

## 2023-08-25 PROCEDURE — 99215 OFFICE O/P EST HI 40 MIN: CPT | Mod: 25

## 2023-08-25 PROCEDURE — 36415 COLL VENOUS BLD VENIPUNCTURE: CPT

## 2023-08-25 NOTE — PHYSICAL EXAM
[Ambulatory and capable of all self care but unable to carry out any work activities] : Status 2- Ambulatory and capable of all self care but unable to carry out any work activities. Up and about more than 50% of waking hours [de-identified] : 9mm x 2mm firm nodule right breast upper inner quadrant below the biopsy site ecchymosis. NO additional palpable masses or axillary nodes bilaterally [Normal] : affect appropriate [de-identified] : Left greater than right lower extremity edema

## 2023-08-25 NOTE — ASSESSMENT
[FreeTextEntry1] : Right breast IDC Found on screening mammogram- 1.2 cm Clinical stage IA ([id number]) ER (%) OK (%) positive, Bzu8vls negative by FISH. KI67- 10-20% s/p right partial mastectomy with SNLB (2/23/23) with Dr Harding Pathological stage IA ([id number]).IDC, 14 mm, G2, 2 sentinel nodes negative Cqo9vnr negative by FISH Mammaprint (done on biopsy)- Luminal A, low risk She saw Dr Timo Leon at [facility] who recommended against radiation. So she opts against radiation Started tamoxifen (3/20/23 -8/2/2023) Held due to recent DVT/PE Saw Dr Hellerman for osteoporosis-> had oral surgery end of JUly 2022 and was advised not to take prolia  Plan Tamoxifen 20 mg QD x 5-10 years.  On hold due to recent VTE.  Will likely resume after her thrombophilia work-up is back as she is going to be on blood thinners long-term Annual Gyn exam Surveillance mammogram and bone density scan due in November.  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  Constipation Worse over the last 1 month Reports that the shape and the size of stool is also different Obtain CT abdomen and pelvis with oral contrast Follow up with Dr Anand for repeat colonoscopy ASAP  Osteoporosis DEXA (11/2022)-  Comparison is made to most recent previous at 2020 and reveals no statistically significant changes in the hip or wrist but a statistically significant increase bone mineral density of 9.2% in the spine which may reflect advancing DJD Follows with Dr Hellerman On Prolia Q6M (9/2020-3/2022) Missed her dose in September due to dental surgery in July/August 2022 Advised to follow up with dental for clearance post surgery and resume prolia  Social hx Lives in [place] with  (Juan Alberto Marino) Was  and  Retired Smoked for a few years during college Occasional alcohol intake  Patient had multiple questions which were answered to satisfaction  Follow up in 3 months CBC, CMP

## 2023-08-25 NOTE — HISTORY OF PRESENT ILLNESS
[de-identified] : Ms. Lizbeth Cody is 82 year old female with IDC of right breast here for consultation, referred by Dr. Omar Trejo. \par  \par  Patient with PMHx of hyperparathyroidism, osteoporosis, b12 and D deficiency, with screening mammogram noted below. \par  \par  normal mammogram . \par  2022  Mammogram/US\par  A 1.2 cm suspicious mass in the 2:00 right breast, 3 cm from the nipple with a mammographic correlate, for which ultrasound-guided biopsy is recommended.\par  ACR CATEGORY: BIR-4 - BI-RADS 4: SUSPICIOUS\par  \par  22 Breast biopsy:\par  A. Breast, Right, 2:00 o'clock, 3cm fn, Ultrasound guided biopsy:\par  - Invasive ductal carcinoma, histologic grade 2,Nothingham score: 7/9 (T:3,N:2,M:1), measuring 12mm in a single core tissue and involving multiple cores\par  Breast biomarkers (block A2):\par  ER: Positive (%, moderate nuclear intensity staining)\par  IN: Positive (%, moderate nuclear intensity staining)\par  HER-2 by IHC: Equivocal (score 2+)\par  Ki-67: 10-20%\par  E-cadherin: Positive; supports the ductal phenotype\par  P63 and Calponin: Negative\par  \par  Age at Menarche - 10\par  Age at Menopause - 49\par  Age at first pregnancy - 21\par  Total number of pregnancies - 3\par  Breast feeding - yes\par  OC pills - none \par  HRT - yes, couple of years\par  \par  FHx\par  M. cousin with breast cancer in her 30s - \par  \par  Social History\par  Smoked, quit in 1960s.\par  Alcohol - no\par  Illicit drugs - no\par  Work -retired, teacher and  \par  Lives with her  \par  \par  She has been on Prolia for two years, held since 2022 due to dental procedure in 2022. \par  \par  She is s/p right partial mastectomy with SNLB (23) with Dr Harding\par  Pathology\par  IDC, 14 mm, G2\par  Pathological stage IA ([id number]). 2 sentinel nodes negative\par  Lfv4ybs negative by FISH\par  \par   [de-identified] : Patient is seen today for follow up Started tamoxifen (3/20/23 -8/2/2023) Accompanied by her  (Juan Alberto Marino)   2 days after her long trip to [place], she developed right chest pain, spontaneously resolved in 3 days. 3 weeks later, she went to [place], [place]. THen she developed left chest pain.  A day later she noticed left LE edema-> went to ED in [facility] [place] [place], [place] Had CT Angio, Doppler USG- which showed left LE DVT, multiple small pulmonary emboli in the left upper lobe and lingular arteries and posterior basilar segmental artery Started xarelto (8/2/23 - present)  She did have h/o left LE DVT (~2018), unprovoked , took xarelto x a little over 12 months  Son- "blood clots- unsure if DVT or PE or both) he is "semi-estranged" No miscarriages

## 2023-09-08 NOTE — PHYSICAL EXAM
[Ambulatory and capable of all self care but unable to carry out any work activities] : Status 2- Ambulatory and capable of all self care but unable to carry out any work activities. Up and about more than 50% of waking hours [Normal] : affect appropriate [de-identified] : Left greater than right lower extremity edema

## 2023-09-08 NOTE — ASSESSMENT
[FreeTextEntry1] : Right breast IDC Found on screening mammogram- 1.2 cm Clinical stage IA ([id number]) ER (%) NC (%) positive, Kfu0vwk negative by FISH. KI67- 10-20% s/p right partial mastectomy with SNLB (2/23/23) with Dr Harding Pathological stage IA ([id number]).IDC, 14 mm, G2, 2 sentinel nodes negative Osa6ama negative by FISH Mammaprint (done on biopsy)- Luminal A, low risk She saw Dr Timo Leon at [facility] who recommended against radiation. So she opts against radiation Started tamoxifen (3/20/23 -8/2/2023) Held due to recent DVT/PE Saw Dr Hellerman for osteoporosis-> had oral surgery end of JUly 2022 and was advised not to take prolia  Plan Tamoxifen 20 mg QD x 5-10 years.  On hold due to recent VTE.  Will likely resume after her thrombophilia work-up is back as she is going to be on blood thinners long-term Annual Gyn exam Surveillance mammogram and bone density scan due in November.  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  Constipation Worse over the last 1 month Reports that the shape and the size of stool is also different Obtain CT abdomen and pelvis with oral contrast Follow up with Dr Anand for repeat colonoscopy ASAP  Osteoporosis DEXA (11/2022)-  Comparison is made to most recent previous at 2020 and reveals no statistically significant changes in the hip or wrist but a statistically significant increase bone mineral density of 9.2% in the spine which may reflect advancing DJD Follows with Dr Hellerman On Prolia Q6M (9/2020-3/2022) Missed her dose in September due to dental surgery in July/August 2022 Advised to follow up with dental for clearance post surgery and resume prolia  Social hx Lives in [place] with  (Juan Alberto Marino) Was  and  Retired Smoked for a few years during college Occasional alcohol intake  Patient had multiple questions which were answered to satisfaction  Follow up in 3 months CBC, CMP

## 2023-09-08 NOTE — HISTORY OF PRESENT ILLNESS
[de-identified] : Ms. Lizbeth Cody is 82 year old female with IDC of right breast here for consultation, referred by Dr. Omar Trejo. \par  \par  Patient with PMHx of hyperparathyroidism, osteoporosis, b12 and D deficiency, with screening mammogram noted below. \par  \par  normal mammogram . \par  2022  Mammogram/US\par  A 1.2 cm suspicious mass in the 2:00 right breast, 3 cm from the nipple with a mammographic correlate, for which ultrasound-guided biopsy is recommended.\par  ACR CATEGORY: BIR-4 - BI-RADS 4: SUSPICIOUS\par  \par  22 Breast biopsy:\par  A. Breast, Right, 2:00 o'clock, 3cm fn, Ultrasound guided biopsy:\par  - Invasive ductal carcinoma, histologic grade 2,Nothingham score: 7/9 (T:3,N:2,M:1), measuring 12mm in a single core tissue and involving multiple cores\par  Breast biomarkers (block A2):\par  ER: Positive (%, moderate nuclear intensity staining)\par  DE: Positive (%, moderate nuclear intensity staining)\par  HER-2 by IHC: Equivocal (score 2+)\par  Ki-67: 10-20%\par  E-cadherin: Positive; supports the ductal phenotype\par  P63 and Calponin: Negative\par  \par  Age at Menarche - 10\par  Age at Menopause - 49\par  Age at first pregnancy - 21\par  Total number of pregnancies - 3\par  Breast feeding - yes\par  OC pills - none \par  HRT - yes, couple of years\par  \par  FHx\par  M. cousin with breast cancer in her 30s - \par  \par  Social History\par  Smoked, quit in 1960s.\par  Alcohol - no\par  Illicit drugs - no\par  Work -retired, teacher and  \par  Lives with her  \par  \par  She has been on Prolia for two years, held since 2022 due to dental procedure in 2022. \par  \par  She is s/p right partial mastectomy with SNLB (23) with Dr Harding\par  Pathology\par  IDC, 14 mm, G2\par  Pathological stage IA ([id number]). 2 sentinel nodes negative\par  Bvk6bvf negative by FISH\par  \par   [de-identified] : Patient is seen today for follow up Started tamoxifen (3/20/23 -8/2/2023) Accompanied by her  (Juan Alberto Marino)   2 days after her long trip to [place], she developed right chest pain, spontaneously resolved in 3 days. 3 weeks later, she went to [place], [place]. THen she developed left chest pain.  A day later she noticed left LE edema-> went to ED in [facility] [place], [place] Had CT Angio, Doppler USG- which showed left LE DVT, multiple small pulmonary emboli in the left upper lobe and lingular arteries and posterior basilar segmental artery Started xarelto (8/2/23 - present)  She did have h/o left LE DVT (~2018), unprovoked , took xarelto x a little over 12 months  Son- "blood clots- unsure if DVT or PE or both) he is "semi-estranged" No miscarriages

## 2023-09-11 ENCOUNTER — RESULT REVIEW (OUTPATIENT)
Age: 83
End: 2023-09-11

## 2023-09-15 ENCOUNTER — APPOINTMENT (OUTPATIENT)
Dept: HEMATOLOGY ONCOLOGY | Facility: CLINIC | Age: 83
End: 2023-09-15
Payer: MEDICARE

## 2023-09-15 VITALS
WEIGHT: 148 LBS | BODY MASS INDEX: 27.94 KG/M2 | TEMPERATURE: 95.8 F | OXYGEN SATURATION: 98 % | HEART RATE: 66 BPM | HEIGHT: 61 IN | SYSTOLIC BLOOD PRESSURE: 110 MMHG | RESPIRATION RATE: 16 BRPM | DIASTOLIC BLOOD PRESSURE: 56 MMHG

## 2023-09-15 PROCEDURE — 99214 OFFICE O/P EST MOD 30 MIN: CPT | Mod: 25

## 2023-09-15 RX ORDER — TAMOXIFEN CITRATE 20 MG/1
20 TABLET, FILM COATED ORAL DAILY
Qty: 30 | Refills: 11 | Status: DISCONTINUED | COMMUNITY
Start: 2023-03-03 | End: 2023-09-15

## 2023-10-03 ENCOUNTER — APPOINTMENT (OUTPATIENT)
Dept: ENDOCRINOLOGY | Facility: CLINIC | Age: 83
End: 2023-10-03
Payer: MEDICARE

## 2023-10-03 DIAGNOSIS — M81.0 AGE-RELATED OSTEOPOROSIS W/OUT CURRENT PATHOLOGICAL FRACTURE: ICD-10-CM

## 2023-10-03 PROCEDURE — 99214 OFFICE O/P EST MOD 30 MIN: CPT | Mod: 95

## 2023-10-05 ENCOUNTER — APPOINTMENT (OUTPATIENT)
Dept: GERIATRICS | Facility: CLINIC | Age: 83
End: 2023-10-05
Payer: MEDICARE

## 2023-10-05 ENCOUNTER — NON-APPOINTMENT (OUTPATIENT)
Age: 83
End: 2023-10-05

## 2023-10-05 VITALS
DIASTOLIC BLOOD PRESSURE: 50 MMHG | RESPIRATION RATE: 18 BRPM | SYSTOLIC BLOOD PRESSURE: 112 MMHG | TEMPERATURE: 97 F | HEART RATE: 70 BPM

## 2023-10-05 DIAGNOSIS — Z97.4 PRESENCE OF EXTERNAL HEARING-AID: ICD-10-CM

## 2023-10-05 PROBLEM — M81.0 OSTEOPOROSIS: Status: ACTIVE | Noted: 2019-01-28

## 2023-10-05 PROCEDURE — 69210 REMOVE IMPACTED EAR WAX UNI: CPT

## 2023-10-05 PROCEDURE — 99212 OFFICE O/P EST SF 10 MIN: CPT | Mod: 25

## 2023-10-06 PROBLEM — Z97.4 WEARS HEARING AID IN BOTH EARS: Status: ACTIVE | Noted: 2023-10-05

## 2023-10-12 ENCOUNTER — RX RENEWAL (OUTPATIENT)
Age: 83
End: 2023-10-12

## 2023-11-07 ENCOUNTER — APPOINTMENT (OUTPATIENT)
Dept: GERIATRICS | Facility: CLINIC | Age: 83
End: 2023-11-07
Payer: MEDICARE

## 2023-11-07 ENCOUNTER — NON-APPOINTMENT (OUTPATIENT)
Age: 83
End: 2023-11-07

## 2023-11-07 VITALS
HEART RATE: 68 BPM | SYSTOLIC BLOOD PRESSURE: 110 MMHG | OXYGEN SATURATION: 98 % | RESPIRATION RATE: 20 BRPM | TEMPERATURE: 97.7 F | DIASTOLIC BLOOD PRESSURE: 70 MMHG

## 2023-11-07 DIAGNOSIS — J06.9 ACUTE UPPER RESPIRATORY INFECTION, UNSPECIFIED: ICD-10-CM

## 2023-11-07 DIAGNOSIS — R09.81 NASAL CONGESTION: ICD-10-CM

## 2023-11-07 PROCEDURE — 99213 OFFICE O/P EST LOW 20 MIN: CPT

## 2023-11-07 RX ORDER — FLUTICASONE PROPIONATE 50 UG/1
50 SPRAY, METERED NASAL DAILY
Qty: 1 | Refills: 0 | Status: ACTIVE | COMMUNITY
Start: 2023-11-07 | End: 1900-01-01

## 2023-11-08 PROBLEM — J06.9 URI, ACUTE: Status: ACTIVE | Noted: 2023-11-07 | Resolved: 2023-12-07

## 2023-11-08 PROBLEM — R09.81 NASAL CONGESTION: Status: ACTIVE | Noted: 2023-11-07

## 2023-11-16 ENCOUNTER — RESULT REVIEW (OUTPATIENT)
Age: 83
End: 2023-11-16

## 2023-11-21 ENCOUNTER — RESULT REVIEW (OUTPATIENT)
Age: 83
End: 2023-11-21

## 2023-11-21 ENCOUNTER — APPOINTMENT (OUTPATIENT)
Dept: HEMATOLOGY ONCOLOGY | Facility: CLINIC | Age: 83
End: 2023-11-21

## 2023-11-21 ENCOUNTER — APPOINTMENT (OUTPATIENT)
Dept: INTERNAL MEDICINE | Facility: CLINIC | Age: 83
End: 2023-11-21
Payer: MEDICARE

## 2023-11-21 ENCOUNTER — LABORATORY RESULT (OUTPATIENT)
Age: 83
End: 2023-11-21

## 2023-11-21 VITALS
HEART RATE: 64 BPM | DIASTOLIC BLOOD PRESSURE: 66 MMHG | OXYGEN SATURATION: 98 % | SYSTOLIC BLOOD PRESSURE: 115 MMHG | BODY MASS INDEX: 28.75 KG/M2 | WEIGHT: 152.25 LBS | TEMPERATURE: 96.1 F | HEIGHT: 61 IN | RESPIRATION RATE: 16 BRPM

## 2023-11-21 VITALS
DIASTOLIC BLOOD PRESSURE: 72 MMHG | OXYGEN SATURATION: 97 % | SYSTOLIC BLOOD PRESSURE: 116 MMHG | TEMPERATURE: 97.5 F | HEIGHT: 61 IN | WEIGHT: 147 LBS | BODY MASS INDEX: 27.75 KG/M2 | HEART RATE: 75 BPM

## 2023-11-21 DIAGNOSIS — Z87.39 PERSONAL HISTORY OF OTHER DISEASES OF THE MUSCULOSKELETAL SYSTEM AND CONNECTIVE TISSUE: ICD-10-CM

## 2023-11-21 DIAGNOSIS — I26.99 OTHER PULMONARY EMBOLISM W/OUT ACUTE COR PULMONALE: ICD-10-CM

## 2023-11-21 DIAGNOSIS — L72.9 FOLLICULAR CYST OF THE SKIN AND SUBCUTANEOUS TISSUE, UNSPECIFIED: ICD-10-CM

## 2023-11-21 DIAGNOSIS — D68.59 OTHER PRIMARY THROMBOPHILIA: ICD-10-CM

## 2023-11-21 DIAGNOSIS — Z79.01 LONG TERM (CURRENT) USE OF ANTICOAGULANTS: ICD-10-CM

## 2023-11-21 DIAGNOSIS — R06.09 OTHER FORMS OF DYSPNEA: ICD-10-CM

## 2023-11-21 DIAGNOSIS — Z98.890 OTHER SPECIFIED POSTPROCEDURAL STATES: ICD-10-CM

## 2023-11-21 DIAGNOSIS — H61.23 IMPACTED CERUMEN, BILATERAL: ICD-10-CM

## 2023-11-21 DIAGNOSIS — R03.0 ELEVATED BLOOD-PRESSURE READING, W/OUT DIAGNOSIS OF HYPERTENSION: ICD-10-CM

## 2023-11-21 DIAGNOSIS — D68.52 PROTHROMBIN GENE MUTATION: ICD-10-CM

## 2023-11-21 DIAGNOSIS — M17.9 OSTEOARTHRITIS OF KNEE, UNSPECIFIED: ICD-10-CM

## 2023-11-21 PROCEDURE — 99213 OFFICE O/P EST LOW 20 MIN: CPT | Mod: 25

## 2023-11-21 PROCEDURE — 36415 COLL VENOUS BLD VENIPUNCTURE: CPT

## 2023-11-21 PROCEDURE — G0439: CPT

## 2023-11-25 LAB
ALBUMIN SERPL ELPH-MCNC: 4.1 G/DL
ALP BLD-CCNC: 71 U/L
ALT SERPL-CCNC: 21 U/L
ANION GAP SERPL CALC-SCNC: 10 MMOL/L
APPEARANCE: CLEAR
AST SERPL-CCNC: 14 U/L
BASOPHILS # BLD AUTO: 0.05 K/UL
BASOPHILS NFR BLD AUTO: 0.8 %
BILIRUB SERPL-MCNC: 0.4 MG/DL
BILIRUBIN URINE: NEGATIVE
BLOOD URINE: NEGATIVE
BUN SERPL-MCNC: 14 MG/DL
CALCIUM SERPL-MCNC: 10.7 MG/DL
CHLORIDE SERPL-SCNC: 105 MMOL/L
CHOLEST SERPL-MCNC: 205 MG/DL
CO2 SERPL-SCNC: 27 MMOL/L
COLOR: YELLOW
CREAT SERPL-MCNC: 0.74 MG/DL
EGFR: 80 ML/MIN/1.73M2
EOSINOPHIL # BLD AUTO: 0.07 K/UL
EOSINOPHIL NFR BLD AUTO: 1.1 %
FOLATE SERPL-MCNC: 8.2 NG/ML
GLUCOSE QUALITATIVE U: NEGATIVE MG/DL
GLUCOSE SERPL-MCNC: 77 MG/DL
HCT VFR BLD CALC: 43.9 %
HDLC SERPL-MCNC: 71 MG/DL
HGB BLD-MCNC: 13.5 G/DL
IMM GRANULOCYTES NFR BLD AUTO: 0.3 %
KETONES URINE: NEGATIVE MG/DL
LDLC SERPL CALC-MCNC: 117 MG/DL
LEUKOCYTE ESTERASE URINE: ABNORMAL
LYMPHOCYTES # BLD AUTO: 2.28 K/UL
LYMPHOCYTES NFR BLD AUTO: 35.3 %
MAN DIFF?: NORMAL
MCHC RBC-ENTMCNC: 30.7 PG
MCHC RBC-ENTMCNC: 30.8 GM/DL
MCV RBC AUTO: 99.8 FL
MONOCYTES # BLD AUTO: 0.54 K/UL
MONOCYTES NFR BLD AUTO: 8.4 %
NEUTROPHILS # BLD AUTO: 3.49 K/UL
NEUTROPHILS NFR BLD AUTO: 54.1 %
NITRITE URINE: NEGATIVE
NONHDLC SERPL-MCNC: 133 MG/DL
PH URINE: 6
PLATELET # BLD AUTO: 248 K/UL
POTASSIUM SERPL-SCNC: 4.2 MMOL/L
PROT SERPL-MCNC: 6.2 G/DL
PROTEIN URINE: NEGATIVE MG/DL
RBC # BLD: 4.4 M/UL
RBC # FLD: 13.2 %
SODIUM SERPL-SCNC: 142 MMOL/L
SPECIFIC GRAVITY URINE: 1.01
TRIGL SERPL-MCNC: 95 MG/DL
TSH SERPL-ACNC: 3.39 UIU/ML
UROBILINOGEN URINE: 0.2 MG/DL
VIT B12 SERPL-MCNC: 544 PG/ML
WBC # FLD AUTO: 6.45 K/UL

## 2023-11-30 ENCOUNTER — APPOINTMENT (OUTPATIENT)
Dept: HEMATOLOGY ONCOLOGY | Facility: CLINIC | Age: 83
End: 2023-11-30
Payer: MEDICARE

## 2023-11-30 VITALS
BODY MASS INDEX: 28.15 KG/M2 | OXYGEN SATURATION: 96 % | TEMPERATURE: 96.2 F | HEIGHT: 61 IN | DIASTOLIC BLOOD PRESSURE: 71 MMHG | SYSTOLIC BLOOD PRESSURE: 109 MMHG | HEART RATE: 73 BPM | RESPIRATION RATE: 16 BRPM | WEIGHT: 149.13 LBS

## 2023-11-30 DIAGNOSIS — C50.211 MALIGNANT NEOPLASM OF UPPER-INNER QUADRANT OF RIGHT FEMALE BREAST: ICD-10-CM

## 2023-11-30 DIAGNOSIS — I82.409 ACUTE EMBOLISM AND THROMBOSIS OF UNSPECIFIED DEEP VEINS OF UNSPECIFIED LOWER EXTREMITY: ICD-10-CM

## 2023-11-30 DIAGNOSIS — Z17.0 MALIGNANT NEOPLASM OF UPPER-INNER QUADRANT OF RIGHT FEMALE BREAST: ICD-10-CM

## 2023-11-30 PROCEDURE — 99214 OFFICE O/P EST MOD 30 MIN: CPT

## 2023-12-01 PROBLEM — I82.409 RECURRENT DEEP VEIN THROMBOSIS: Status: ACTIVE | Noted: 2023-08-25

## 2023-12-01 PROBLEM — C50.211 MALIGNANT NEOPLASM OF UPPER-INNER QUADRANT OF RIGHT BREAST IN FEMALE, ESTROGEN RECEPTOR POSITIVE: Status: ACTIVE | Noted: 2022-12-20

## 2024-01-03 ENCOUNTER — NON-APPOINTMENT (OUTPATIENT)
Age: 84
End: 2024-01-03

## 2024-03-05 ENCOUNTER — APPOINTMENT (OUTPATIENT)
Dept: ENDOCRINOLOGY | Facility: CLINIC | Age: 84
End: 2024-03-05
Payer: MEDICARE

## 2024-03-05 VITALS
OXYGEN SATURATION: 99 % | HEIGHT: 61 IN | DIASTOLIC BLOOD PRESSURE: 80 MMHG | BODY MASS INDEX: 28.62 KG/M2 | WEIGHT: 151.56 LBS | SYSTOLIC BLOOD PRESSURE: 114 MMHG | HEART RATE: 62 BPM

## 2024-03-05 DIAGNOSIS — M81.0 AGE-RELATED OSTEOPOROSIS W/OUT CURRENT PATHOLOGICAL FRACTURE: ICD-10-CM

## 2024-03-05 DIAGNOSIS — D86.9 SARCOIDOSIS, UNSPECIFIED: ICD-10-CM

## 2024-03-05 PROCEDURE — 36415 COLL VENOUS BLD VENIPUNCTURE: CPT

## 2024-03-05 PROCEDURE — 99215 OFFICE O/P EST HI 40 MIN: CPT

## 2024-03-05 PROCEDURE — G2211 COMPLEX E/M VISIT ADD ON: CPT

## 2024-03-05 NOTE — HISTORY OF PRESENT ILLNESS
[FreeTextEntry1] : Mar 05, 2024    in person  PCP:     Dr. Catie Mcnally             Gyn: Dr. Caity Reynolds             Urol: Dr. Uriel Alex (stones - one episode)              Oral surgeon: Dr. Nugent - dental implants             H/O:   Dr. Rosemary Juarez  - breast cancer -> Tamox     Stopped post DVT and found protein S defic.  now Xaralto.            .            CC: Osteoporosis (prev. Forteo): Fosamax Jan 2014 2/23/2018  CTX s 383  ionized Ca  5.9 (<5.6) [facility]                               1/31/2019:  no compression fractures.  Low vit D                                  12/18/2018:  BD Lin Spine T -3.0, TH -1.4, FN -2.2, forearm -2.5                               11/8/19 hypercalcemia  Ca 10.5 (8.6 - 10.4) [facility]  CTXs 429  Vit D 30 (no PTH)                                   4/30/20 [facility]:  PTH 94;  CTXs 598                                12/21/2020:  BD Lin  Spine T -3.1                   Thyroid nodule  2.3 cm L, FNAB 6/2017 [facility] - benign- low cellularity                   Kidney stones  10/2015 US Kidney - asymptomatic - saw Dr. Alex                    Hypercalciuria   (5/2020 calcium 302 mg/w hrs at [facility])   82 yo - still goes to oral surgeon - had to skip a Prolia. - last 1 1/2 yrs ago   Last saw end of July. 2023 Has occasional elevated PTH (US thyroid, sestamibi neg in 2018) Has history of possible asymptomatic kidney stone.  Lab tests from 11/2023 included  calcium 10.7 **           Vit D usually mid 30's   9/3/2021 PTH 78, calcium 10.2   : : Constitutional:  Alert, well nourished, healthy appearance, no acute distress  Eyes:  No proptosis, no stare Thyroid: Pulmonary:  No respiratory distress, no accessory muscle use; normal rate and effort Cardiac:  Normal rate Vascular:  Endocrine:  No stigmata of Cushings Syndrome Musculoskeletal:  Normal gait, no involuntary movements Neurology:  No tremors Affect/Mood/Psych:  Oriented x 3; normal affect, normal insight/judgement, normal mood  . Imp:  Dental and other issues determined last Prolia about 1.5  yrs ago.  Has hypercalciuria, likely hyperparathyroidism, worsening osteoporosis of the spine.  Plan:  X-ray spine Updated labs. Consider for Evenity [id number] by Prolia or Reclast.        Oct 03, 2023      nip                 PCP:     Dr. Catie Mcnally             Gyn: Dr. Caity Reynolds             Urol: Dr. Uriel Alex (stones - one episode)              Oral surgeon: Dr. Nugent - dental implants             H/O:   Dr. Rosemary Juarez  - breast cancer -> Tamox            .            CC: Osteoporosis (prev. Forteo): Fosamax Jan 2014 2/23/2018  CTX s 383  ionized Ca  5.9 (<5.6) [facility]                               1/31/2019:  no compression fractures.  Low vit D                                  12/18/2018:  BD Lin Spine T -3.0, TH -1.4, FN -2.2, forearm -2.5                               11/8/19 hypercalcemia  Ca 10.5 (8.6 - 10.4) [facility]  CTXs 429  Vit D 30 (no PTH)                                   4/30/20 [facility]:  PTH 94;  CTXs 598                                12/21/2020:  BD Lin  Spine T -3.1                   Thyroid nodule  2.3 cm L, FNAB 6/2017 [facility] - benign- low cellularity                   Kidney stones  10/2015 US Kidney - asymptomatic - saw Dr. Alex                    Hypercalciuria   (5/2020 calcium 302 mg/w hrs at [facility])   81 yo -  visits for osteoporosis of spine (no compressions 2020 X-ray); hyperalciuria; likely hyperparathyroidism (negative sestamibi scah)      still goes to oral surgeon - had to skip a Prolia. Last saw end of July.  Has occasional elevated PTH (US thyroid, sestamibi neg in 2018) Has history of possible asymptomatic kidney stone.    Was on Tamoxifen, now Xaralto.      Had oral surgery a year ago - last surgery with Dr. Nugent in July    will see him again in November.     BD in November.   Taking vit D   was 33 in March 2023.      Imp:  No fractures;  no falls.       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   Will continue same Rx.     -    Mar 29, 2023    in person  PCP:     Dr. Catie Mcnally             Gyn: Dr. Caity Reynolds             Urol: Dr. Uriel Alex (stones - one episode)              Oral surgeon: Dr. Nugent - dental implants             H/O:   Dr. Rosemary Juarez  - breast cancer -> Tamox            .            CC: Osteoporosis (prev. Forteo): Fosamax Jan 2014 2/23/2018  CTX s 383  ionized Ca  5.9 (<5.6) [facility]                               1/31/2019:  no compression fractures.  Low vit D                                  12/18/2018:  BD Lni Spine T -3.0, TH -1.4, FN -2.2, forearm -2.5                               11/8/19 hypercalcemia  Ca 10.5 (8.6 - 10.4) [facility]  CTXs 429  Vit D 30 (no PTH)                                   4/30/20 [facility]:  PTH 94;  CTXs 598                                12/21/2020:  BD Lin  Spine T -3.1                   Thyroid nodule  2.3 cm L, FNAB 6/2017 [facility] - benign- low cellularity                   Kidney stones  10/2015 US Kidney - asymptomatic - saw Dr. Alex                    Hypercalciuria   (5/2020 calcium 302 mg/w hrs at [facility])   81 yo - still goes to oral surgeon - had to skip a Prolia. Last saw end of July.  Has occasional elevated PTH (US thyroid, sestamibi neg in 2018) Has history of possible asymptomatic kidney stone.  : : Constitutional:  Alert, well nourished, healthy appearance, no acute distress  Eyes:  No proptosis, no stare Thyroid: Pulmonary:  No respiratory distress, no accessory muscle use; normal rate and effort Cardiac:  Normal rate Vascular:  Endocrine:  No stigmata of Cushing's Syndrome Musculoskeletal:  Normal gait, no involuntary movements Neurology:  No tremors Affect/Mood/Psych:  Oriented x 3; normal affect, normal insight/judgement, normal mood  .  Imp:  Can restart the Prolia for now and will consider for Reclast eventually.        Jun 08, 2022      iPhone  PCP:     Dr. Catie Mcnally             Gyn: Dr. Caity Reynolds             Urol: Dr. Uriel Alex (stones - one episode)              Oral surgeon: Dr. Nugent - dental implants            .            CC: Osteoporosis (prev. Forteo): Fosamax Jan 2014 2/23/2018  CTX s 383  ionized Ca  5.9 (<5.6) [facility]                               1/31/2019:  no compression fractures.  Low vit D                                  12/18/2018:  BD Lin Spine T -3.0, TH -1.4, FN -2.2, forearm -2.5                               11/8/19 hypercalcemia  Ca 10.5 (8.6 - 10.4) [facility]  CTXs 429  Vit D 30 (no PTH)                                   4/30/20 [facility]:  PTH 94;  CTXs 598                                12/21/2020:  BD Lin  Spine T -3.1                   Thyroid nodule  2.3 cm L, FNAB 6/2017 [facility] - benign- low cellularity                   Kidney stones  10/2015 US Kidney - asymptomatic - saw Dr. Alex                    Hypercalciuria   (5/2020 calcium 302 mg/w hrs at [facility])   Last Prolia 9/27/2021 and so next likely 3/27/2022 - Rx sent and she will also call Getting calcium in diet and taking 1000 iu vit D daily    Moste recent Prolia 3/28 and next is 9/28/22  After htat will be 3/2023 so next visit around January 2023 Feb 02, 2022       iPhone (she resides at [facility])  PCP:     Dr. Catie Mcnally             Gyn: Dr. Caity Reynolds             Urol: Dr. Uriel Alex (stones - one episode)              Oral surgeon: Dr. Nugent - dental implants            .            CC: Osteoporosis (prev. Forteo): Fosamax Jan 2014 2/23/2018  CTX s 383  ionized Ca  5.9 (<5.6) [facility]                               1/31/2019:  no compression fractures.  Low vit D                                  12/18/2018:  BD Lin Spine T -3.0, TH -1.4, FN -2.2, forearm -2.5                               11/8/19 hypercalcemia  Ca 10.5 (8.6 - 10.4) [facility]  CTXs 429  Vit D 30 (no PTH)                                   4/30/20 [facility]:  PTH 94;  CTXs 598                                12/21/2020:  BD Lin  Spine T -3.1                   Thyroid nodule  2.3 cm L, FNAB 6/2017 [facility] - benign- low cellularity                   Kidney stones  10/2015 US Kidney - asymptomatic - saw Dr. Alex                    Hypercalciuria   (5/2020 calcium 302 mg/w hrs at [facility])   Last Prolia 9/27/2021 and so next likely 3/27/2022 - Rx sent and she will also call Getting calcium in diet and taking 1000 iu vit D daily    Imp:  Doing well, no falls. Plan:  Next Prolia ~ 3/27/2022 followed by ~ 9/27/2022 and ROV here in 6/2022 and then more labs.    Sep 03, 2021        iPhone    at [facility]  PCP:     Dr. Catie Mcnally             Gyn: Dr. Caity Reynolds             Urol: Dr. Uriel Alex (stones - one episode)              Oral surgeon: Dr. Nugent - dental implants            .            CC: Osteoporosis (prev. Forteo): Fosamax Jan 2014 2/23/2018  CTX s 383  ionized Ca  5.9 (<5.6) [facility]                               1/31/2019:  no compression fractures.  Low vit D                                  12/18/2018:  BD Lin Spine T -3.0, TH -1.4, FN -2.2, forearm -2.5                               11/8/19 hypercalcemia  Ca 10.5 (8.6 - 10.4) [facility]  CTXs 429  Vit D 30 (no PTH)                                   4/30/20 [facility]:  PTH 94;  CTXs 598                                12/21/2020:  BD Lin  Spine T -3.1                   Thyroid nodule  2.3 cm L, FNAB 6/2017 [facility] - benign- low cellularity                   Kidney stones  10/2015 US Kidney - asymptomatic - saw Dr. Alex                    Hypercalciuria   (5/2020 calcium 302 mg/w hrs at [facility])  Visits for osteoporosis (also thyroid nodule, hypercalciuria, hyperparathyroid - mostly normocalcemic, neg sestamibi; Hx low vitamin D) Recently received second Pfizer Covid vaccine.  6/3/2021 labs at [place] included calcium 10.7 (< 10.5) PTH 43  (had been 71 in October 2020 when calcium was 9.7)  Treated with Forteo followed by alendronate.   After holiday from alendronate, started on Prolia #1 on Feb 19, 2021 She is on vit D 1000 iu daily.   Prolia #2 could have been Aug 19.    Imp: Osteoporosis of spine.    Next BD late Dec 2022   Prolia #2 can be now after updated vitamin D level.   ROV within 5 months     Feb 11, 2021   phone i     [phone]   PCP:     Dr. Catie Mcnally             Gyn: Dr. Caity Reynolds             Urol: Dr. Uriel Alex (stones - one episode)              Oral surgeon: Dr. Nugent - dental implants            .            CC: Osteoporosis (prev. Forteo): Fosamax Jan 2014 2/23/2018  CTX s 383  ionized Ca  5.9 (<5.6) [facility]                               1/31/2019:  no compression fractures.  Low vit D                                  12/18/2018:  BD Lin Spine T -3.0, TH -1.4, FN -2.2, forearm -2.5                               11/8/19 hypercalcemia  Ca 10.5 (8.6 - 10.4) [facility]  CTXs 429  Vit D 30 (no PTH)                   Thyroid nodule  2.3 cm L, FNAB 6/2017 [facility] - benign- low cellularity                   Kidney stones  10/2015  Kidney - asymptomatic - saw Dr. Alex                    Hypercalciuria  Visits for osteoporosis (also thyroid nodule, hyperparathroid) Recenty received second Pfizer Covid vaccine.  Has not required any oral surgery in past few years. She used to have low vitamin D, now takes 1000 iu daily Spine T -3.1, Z -0.6.   T down from previus T of -3.0  Impression:  Not good candidate for parathyroid surgery. Plan:  Consider for Prolia      Nov 25, 2020    in person  PCP:     Dr. Catie Mcnally             Gyn: Dr. Caity Reynolds             Urol: Dr. Uriel Alex (stones - one episode)              Oral surgeon: Dr. Nugent - dental implants            .            CC: Osteoporosis (prev. Forteo): Fosamax Jan 2014 2/23/2018  CTX s 383  ionized Ca  5.9 (<5.6) [facility]                               1/31/2019:  no compression fractures.  Low vit D                                  12/18/2018:  BD Lin Spine T -3.0, TH -1.4, FN -2.2, forearm -2.5                               11/8/19 hypercalcemia  Ca 10.5 (8.6 - 10.4) [facility]  CTXs 429  Vit D 30 (no PTH)                   Thyroid nodule  2.3 cm L, FNAB 6/2017 [facility] - benign- low cellularity                   Kidney stones  10/2015  Kidney - asymptomatic - saw Dr. Alex                    Hypercalciuria  Saw her oral surgeon, Dr. Nugent, in the Spring.   No tooth extractions or dental implants planned in foreseeable future. 4/30/20  [facility] included calcium 10.1 PTH 94 osteocalcin 27  vit D 30 1,25 82 (18-72) 24 hour urine calcium 255,   creat 0.84 gm  : : Constitutional:  Alert, well nourished, healthy appearance, no acute distress  Eyes:  No proptosis, no lid lag Thyroid: Pulmonary:  No respiratory distress, no accessory muscle use; normal rate and effort Cardiac:  Normal rate Vascular:  Endocrine:  No stigmata of Cushing's Syndrome Musculoskeletal:  Normal gait, no involuntary movements Neurology:  No tremors Affect/Mood/Psych:  Oriented x 3; normal affect, normal insight/judgement, normal mood  .  Impression:  Osteoporosis of spine.    No compression or other fractures. Hypercalciuria with normocalcemic hyperparathyroidism.    Impression:  She is eligible for follow up bone density after December 18, 2020 and will likely go for Prolia injection after that.     October 20, 2020 labs at [place] included TSH 1.99 B12 491 PTH 71 (15-65) calcium 9.7       May 05, 2020  Telephone Visit  PCP:     Dr. ZEB Saleh             Gyn: Dr. Caity Reynolds             Urol: Dr. Uriel Alex (stones - one episode)              Oral surgeon: Dr. Nugent - dental implants            .            CC: Osteoporosis (prev. Forteo): Fosamax Jan 2014 2/23/2018  CTX s 383  ionized Ca  5.9 (<5.6) [facility]                               1/31/2019:  no compression fractures.  Low vit D                                  6/27/19 BD Lin Spine T -3.0, TH -1.4, FN -2.2, forearm -2.5                               11/8/19 hypercalcemia  Ca 10.5 (8.6 - 10.4) [facility]  CTXs 429  Vit D 30 (no PTH)                   Thyroid nodule  2.3 cm L, FNAB 6/2017 [facility] - benign- low cellularity                   Kidney stones  10/2015 US Kidney - asymptomatic   No history of fracture.  Spine T score -3.0  12/18/2018  Recent labs include 24 hour urine calcium of 255 mg 25 OH vit D 30 on ~1000 iu daily. PTH 94 (<94)  Labs at [facility] 11/8/2019 included calcium 10.5    Impression:  Trajectory of spine bone density previously improved, but appears to have plateaued and perhaps may be heading back down.  Not an ideal candidate for PTX.   May consider Prolia in the future.    Impression:  On "holiday" from alendronate.     Nov 12, 2019  PCP:     Dr. ZEB Saleh             Gyn: Dr. Caity Reynolds             Urol: Dr. Uriel Alex (stones - one episode)              Oral surgeon: Dr. Nugent - dental implants            .            CC: Osteoporosis (prev. Forteo): Fosamax Jan 2014 2/23/2018  CTX s 383  ionized Ca  5.9 (<5.6) [facility]                               1/31/2019:  no compression fractures.  Low vit D                                  6/27/19 BD Lin Spine T -3.0, TH -1.4, FN -2.2, forearm -2.5                               11/8/19 hypercalcemia  Ca 10.5 (8.6 - 10.4) [facility]  CTXs 429  Vit D 30 (no PTH)                   Thyroid nodule  2.3 cm L, FNAB 6/2017 [facility] - benign- low cellularity                   Kidney stones  10/2015 US Kidney - asymptomatic    #  Will ask her to have f/u US thyroid within next year.  #  Osteoporos of spine          Has been on "holiday" from alendronate.            Impression:  Previously diagnosed with normocalcemic hyperparathryoidism; however, most recent           [facility] calcium slightly elevated at 10.5, normal up to 10.4, with normal renal function and          25 OH vitmin D level of 30  (but no PTH)            X-ray spine neg:               Lumbosacral Spine.  Frontal, lateral and cone-down projections demonstrate satisfactory alignment of the bony structures. No fracture or vertebral compression can be appreciated. There is no evidence of pedicle destruction. Intervertebral disc spaces are satisfactorily maintained. The sacroiliac joints are symmetric. As compared to the patient's previous study of 9/29/2014, there has been no significant change of an adverse nature.   IMPRESSION: No evidence of lumbar compression deformity. No significant change from 9/29/2014.   ***Electronically Signed *** ----------------------------------------------- Wojciech Biggs MD              01/31/19 1134   IMPRESSION:  No focal site of initial increased and persistent sestamibi tracer localization is evident to specifically suggest uptake in parathyroid adenoma or hyperplasia.  The left thyroid lobe is asymmetrically much larger than the right with much more intense tracer localization.     ***Electronically Signed *** ----------------------------------------------- Donovan Hollis MD              12/06/18   Imp:  Sestamibi negative.       Plan:  24 hour Ca at [facility] with PTH, vit D before next visit in 6 months - will likely decide on Prolia at that time; however, will also reconsider evaluation of parathyroid.       June 27, 2019  PCP:     Dr. ZEB Saleh (to see) ******             Gyn: Dr. Caity Reynolds             Urol: Dr. Uriel Alex (stones - one episode)              Oral surgeon: Dr. Nugent - dental implants            .            CC: Osteoporosis (prev. Forteo): Fosamax Jan 2014  79 yo on "holiday" from Fosamax.   Lab tests from yesterday at [facility] include  TSH 1.81 25 OH vit D 24  Lumbar spine, L1 to L2, total: 0.647 gm./cm.2.  T-score: -3.0  Z-score: -0.6  Left hip: 0.767 gm./cm.2.  T-score: -1.4   Z-score: 0.6  Left femoral neck: 0.606  T score: -2.2  Z score: 0.1   Left forearm, distal third: 0.544  T score: -2.5  Z score: 0.5  On holiday from alendronate.  Last dental implant a few weeks ago.   After a bone graft ! No other implants on the horizon. Plan:  Updated labs late Nov, ROV Dec. Likely Prolia next time around    January 31, 2019  PCP: Dr. Supriya Segovia/Dr. Wojciech Lee             Gyn: Dr. Caity Reynolds             Urol: Dr. Uriel Alex (stones - one episode)              Oral surgeon: Dr. Nugent - dental implants            .            CC: Osteoporosis (prev. Forteo): Fosamax Jan 2014             Last BD: 12/12/2016             Asymptomatic kidney stones **             Hypercalciuria **             (Lyme )             History of partial thyroid lobectomy;             Thyroid nodules (sono 12/12/2016)             -FNAB 6/7/17 [facility] of 2.3 cm L nodule: neg              History of I-131 for hyperthyroidism             new: DVT LLE 6/2017.            " IMPRESSION: No significant change in the appearance of the thyroid since 12/12/2016, including a             previously biopsied left lower pole nodule, and an hypoechoic nodule at the posterior aspect of the             left upper pole that could be either exophytic or extrathyroidal (parathyroid). In view of the             history of hyperparathyroidism, follow-up radionuclide parathyroid scintigraphy can be performed             for further more specific evaluation in this regard.             ***Electronically Signed ***             -----------------------------------------------             Donovan Hollis MD 05/15/18    Recent bone density shows spine T -3.1 and trending down. Ultrasound thyroid, no significant change since 12/12/2016 including previously biopsied nodule.   Sestamibi parathyroid scan : negative   Recent [facility] ionized calcium 5.7 (4.9 - 5.6) ** PTH 73 (14-64)  CTXs  653 osteocalcin 24 phos 3.3 TSH 2.88 vit D 28  No compression fx in 2014.  Imp:  Normocalcemic hyperparathyroid. Would benefit from antiresorptive. Consider Evenity when available. ROV 6 months   Previous notes from [facility] appended below.   June 22, 2018            .            PCP: Dr. Supriya Segovia/Dr. Wojciech Lee             Gyn: Dr. Caity Reynolds             Urol: Dr. Uriel Alex (stones - one episode)              Oral surgeon: Dr. Nugent - dental implants            .            CC: Osteoporosis (prev. Forteo): Fosamax Jan 2014             Last BD: 12/12/2016             Asymptomatic kidney stones **             Hypercalciuria **             (Lyme )             History of partial thyroid lobectomy;             Thyroid nodules (sono 12/12/2016)             -FNAB 6/7/17 [facility] of 2.3 cm L nodule: neg              History of I-131 for hyperthyroidism             new: DVT LLE 6/2017.            " IMPRESSION: No significant change in the appearance of the thyroid since 12/12/2016, including a             previously biopsied left lower pole nodule, and an hypoechoic nodule at the posterior aspect of the             left upper pole that could be either exophytic or extrathyroidal (parathyroid). In view of the             history of hyperparathyroidism, follow-up radionuclide parathyroid scintigraphy can be performed             for further more specific evaluation in this regard.             ***Electronically Signed ***             -----------------------------------------------             Donovan Hollis MD 05/15/18             .            .            Impression: Based on above, I will ask her to stop by [facility] for sestamibi parathroid scan and to return after that.            can then decide on when and if to start another round of treatment for the osteoporosis.             .            ==            .            Feb 2, 2018            .            PCP: Dr. Supriya Segovia/Dr. Wojciech Lee             Gyn: Dr. Caity Reynolds             Urol: Dr. Uriel Alex (stones - one episode)              Oral surgeon: Dr. Nugent - dental implants            .            CC: Osteoporosis (prev. Forteo): Fosamax Jan 2014             Last BD: 12/12/2016             Asymptomatic kidney stones **             Hypercalciuria **             (Lyme )             History of partial thyroid lobectomy;             Thyroid nodules (sono 12/12/2016)             -FNAB 6/7/17 [facility] of 2.3 cm L nodule: neg              History of I-131 for hyperthyroidism             new: DVT LLE 6/2017.            .            Stopped a/c two weeks ago.            Stopped Fosamax end of Dec 2017 after 4 years.             .             Labs ([facility]) from             1/23/2018            TSH 2.66            calcium 10.3            ionized calcium 5.9 (4.8 - 5.6) ***            phos 3.4            PTH 64 (14-64) ***            25 OH vit D            CTXs 383 ****            .            Impression: Took two years of Forteo, 4 years alendronate, started "holiday" recently, has dental imiplants, eligible for BD Dec 2018 and will likely benefit from:            -sestamibi parathyroid scan            -Prolia after next BD             - need fu u/s thyroid around April and RDV May.             .            ==            .            Sept 15, 2017            .            PCP: Dr. Supriya Segovia             Gyn: Dr. Caity Reynolds             Urol: Dr. Uriel Alex (stones - one episode)             .            CC: Osteoporosis (prev. Forteo): Fosamax Jan 2014             Last BD: 12/12/2016             Asymptomatic kidney stones **             Hypercalciuria **             (Lyme )             History of partial thyroid lobectomy;             Thyroid nodules (sono 12/12/2016)             History of I-131 for hyperthyroidism             new: DVT LLE 6/2017.            .            Went for FNAB thyroid nodule at [facility] - cytology read as benign            .            Bone density stable and December 2017 she will have been on Fosamax for 4 years, so will advise "holiday".            .            Now on Xaralto because of L DVT:            .            .            "6/23/2017            IMPRESSION: Positive DVT study with thrombus extending from the posterior tibial vein into the popliteal vein."            .            My impression:             Labs at [facility] on             9/11/2017 included             calcium 10.2 (8.6-10.4)            phos 3.2            PTH 73 (14-64)            TSH 2.55             25 OH vit D 28            CTXs 282 (on alendronate)             .            Impression:             FNAB thyroid at [facility] (after first at [facility]") reassuring.            .             Normocalcemic hyperparathyroidism.            Bone density stable.            Can go on "holiday" from alendronate.            Monitor calcium/PTH            ROV February.             Next BD ~Dec 2019            .            ==            .            May 5, 2017            .            PCP: Dr. Supriya Segovia             Gyn: Dr. Caity Reynolds             Urol: Dr. Uriel Alex (stones - one episode)             .            CC: Osteoporosis (prev. Forteo): Fosamax Jan 2014             Last BD: 12/12/2016             Asymptomatic kidney stones **             Hypercalciuria **             (Lyme )             History of partial thyroid lobectomy;             Thyroid nodules (sono 12/12/2016)             History of I-131 for hyperthyroidism             .            .            Imp/Plan:            .            Biopsy of dominant thyroid nodule at [place] was not successful.            Went to [facility] for sonogram and Dr. Phipps also advised FNAB so will ask her to try there.             .            Bone density seems to have improved on Fosamax. End of this year will be four years and a reasonable time to consider a "holiday" if she and the rest of her team agree.            .            Monitor vit D and PTH level.            ROV in September.             .            .            .            ==            .            March 3, 2017            .            PCP: Dr. Supriya Segovia             Gyn: Dr. Caity Reynolds            .            CC: Osteoporosis (prev. Forteo): Fosamax Jan 2014             Last BD: 12/12/2016             Asymptomatic kidney stones              Hypercalciuria              (Lyme )             History of partial thyroid lobectomy;             Thyroid nodules (sono 12/12/2016)             History of I-131 for hyperthyroidism            .            Visited [facility] and enjoyed it.             .            Labs ([facility])            24 hour urine collection             1 g creatinine             calcium 287 (<250)            .            creatinine 0.81            uric acid 4.2            serum calcium 10.4 (8.6-10.4)             PTH 60             25 OH vit D 27             CTXs 278            .            Bone density stable            Teeth stable            Thyroid sono advises FNAB of nodule (she is s/p partial lobectomy in college and I-131 for hyperthyroidism several years ago)            .            Impression: Because of evidence of possible normocalcemic hyperparathyroidism, she went for ultrasound of thyroid/parathyroid and nodules detected with recommendation for FNAB             .            Osteoporosis currently stable.             .            Plan: ROV in two months (after FNAB of thyroid nodule)            .            ==            .;            October 7, 2016            .            PCP: Dr. Supriya Segovia             Gyn: Dr. Caity Reynolds            .            CC: Osteoporosis (prev. Forteo): Fosamax Jan 2014             (Lyme last             .            Dental issues are quiet            Remains on alendronate weekly.            .            Blood tests ([facility]) from             3/18/2016 inclued            BS 87 mg/dl            calcium 9.1            phos 3.1            TSH 2.28             Vit B12 367            PTH 78 (14-65)             creat 0.79            vit D 35            ionized calcium - normal             .            Did not yet go for BD.            For problem with urination saw Uriel at [facility].             sono showed bilateral tiny kidney stones.             stopped calcium supp after that.             (mother had stones)            .            Plan: sono thyroid            repeat PTH            24 hour urine ca/stone former            bone density            ROV Feb            continue Fosamax            .            ==            .            March 18, 2016            .            PCP: Dr. Supriya Segovia             Gyn: Dr. Caity Reynolds            .            CC: Osteoporosis (prev. Forteo): Fosamax Jan 2014             (Lyme last             .            Had bone grafting lower part of her mouth - last September.            .            Plan: Updated labs now.            ROV late October after BD            .            ==            .            June 5, 2015            .            PCP: Dr. Supriya Segovia             Gyn: Dr. Caity Reynolds            .            CC: Osteoporosis (prev. Forteo): Fosamax Jan 2014             (Lyme last             .            [facility]            .            Recent [facility] labs show 25 OH vit 41            CTXs 148            ionized calcium 5.7 (4.8-5.6)             .            Impression: Took Forteo for two years and has been on Fosamax since Jan 2014.            .            Impression: Dental issues are quiet. Serial bone densities show considerable improvement over time, especially in her spine. Last BD Sept 2014 showed spine T -2.6 Z -0.3.            .            Plan: Continue Fosamax for 3-5 years.            .Next BD ~ Oct 2016.            ROV here in March.            .            .            ===            October 28, 2014            PCP: Dr. Supriya Segovia            CC: Osteoporosis (prev. Forteo)            .            Treated for Lyme over the summer.            On Fosamax since Jan 2014.            April 2014 CTXs 192 (10/2013 CTXs 341).             vit D 36            .            Much of improvement in bone density was likely from the Forteo and she is now holding her own with Fosamax.             .            Dental implant fine.            .            Plan: Continue the Fosamax for up to 3 years and then consider Prolia.            ROV six months.             .            ===            April 8, 2014            PCP: Dr. Supriya Segovia            CC: Osteoporosois            .            Took Forteo for two years.            Because of dental issues preferred to take Calcitonin after that.            Switched to Fosamax around Januiary 2014.            Notes no symptoms from Fosamax.            Her densist, Dr. Nugent, is pleased with her progress.            She goes for teeth cleaning every 3 months.            .            Impression: Markers of bone turnover have decreased on Fosamax, predicting absorption and effectiveness.            .            Plan: Same Rx. Follow up BD two years after last.             .            .            ====            October 28, 2013            PCP: Dr. Supriya Segovia            CC: Osteoporosis (occasion hypercalcemia); previous Forteo            .            Blood tests at [facility] in July showed calcium 10.3, PTH 47            CTXs 342.             BSA 18.1 (14.2 - 42.7)            .            Labs from today still pending.            Dr. Nugent said: "Dr. Hellerman can do whatever he wants now."            Has a 3 month supply of calcitonin.             Plan: After finish            .            ========            March 26, 2013 Returns for osteoporosis. Has not yet received approval from Dr. Nugent to have more aggressive treatment than calcitonin (such as Fosamax, Reclast, or Prolia). She will be seeing Dr. Nugent within 3 months and will get his approval. In the meatime, she will stay on the calcitonin nasal spray.             .            Had blood tests last week at [facility] in [place].     ROS:

## 2024-03-13 LAB
24R-OH-CALCIDIOL SERPL-MCNC: 78.2 PG/ML
25(OH)D3 SERPL-MCNC: 32.3 NG/ML
ANION GAP SERPL CALC-SCNC: 11 MMOL/L
BUN SERPL-MCNC: 17 MG/DL
CA-I SERPL-SCNC: 5.9 MG/DL
CALCIUM SERPL-MCNC: 10.9 MG/DL
CALCIUM SERPL-MCNC: 10.9 MG/DL
CHLORIDE SERPL-SCNC: 104 MMOL/L
CO2 SERPL-SCNC: 24 MMOL/L
CREAT SERPL-MCNC: 0.76 MG/DL
EGFR: 77 ML/MIN/1.73M2
GLUCOSE SERPL-MCNC: 90 MG/DL
OSTEOCALCIN SERPL-MCNC: 21.8 NG/ML
PARATHYROID HORMONE INTACT: 58 PG/ML
PHOSPHATE SERPL-MCNC: 3.8 MG/DL
POTASSIUM SERPL-SCNC: 4.2 MMOL/L
SODIUM SERPL-SCNC: 138 MMOL/L

## 2024-04-02 ENCOUNTER — RESULT REVIEW (OUTPATIENT)
Age: 84
End: 2024-04-02

## 2024-04-02 ENCOUNTER — APPOINTMENT (OUTPATIENT)
Dept: HEMATOLOGY ONCOLOGY | Facility: CLINIC | Age: 84
End: 2024-04-02
Payer: MEDICARE

## 2024-04-02 VITALS
BODY MASS INDEX: 27.94 KG/M2 | HEIGHT: 61 IN | DIASTOLIC BLOOD PRESSURE: 63 MMHG | OXYGEN SATURATION: 97 % | SYSTOLIC BLOOD PRESSURE: 122 MMHG | WEIGHT: 148 LBS | TEMPERATURE: 96.6 F | HEART RATE: 62 BPM | RESPIRATION RATE: 16 BRPM

## 2024-04-02 DIAGNOSIS — C50.911 MALIGNANT NEOPLASM OF UNSPECIFIED SITE OF RIGHT FEMALE BREAST: ICD-10-CM

## 2024-04-02 PROCEDURE — 36415 COLL VENOUS BLD VENIPUNCTURE: CPT

## 2024-04-02 PROCEDURE — G2211 COMPLEX E/M VISIT ADD ON: CPT

## 2024-04-02 PROCEDURE — 99214 OFFICE O/P EST MOD 30 MIN: CPT

## 2024-04-04 LAB — COLLAGEN CTX SERPL-MCNC: 408 PG/ML

## 2024-04-04 NOTE — HISTORY OF PRESENT ILLNESS
[de-identified] : Ms. Lizbeth Cody is 82 year old female with IDC of right breast here for consultation, referred by Dr. Omar Trejo. \par  \par  Patient with PMHx of hyperparathyroidism, osteoporosis, b12 and D deficiency, with screening mammogram noted below. \par  \par  normal mammogram . \par  2022  Mammogram/US\par  A 1.2 cm suspicious mass in the 2:00 right breast, 3 cm from the nipple with a mammographic correlate, for which ultrasound-guided biopsy is recommended.\par  ACR CATEGORY: BIR-4 - BI-RADS 4: SUSPICIOUS\par  \par  22 Breast biopsy:\par  A. Breast, Right, 2:00 o'clock, 3cm fn, Ultrasound guided biopsy:\par  - Invasive ductal carcinoma, histologic grade 2,Nothingham score: 7/9 (T:3,N:2,M:1), measuring 12mm in a single core tissue and involving multiple cores\par  Breast biomarkers (block A2):\par  ER: Positive (%, moderate nuclear intensity staining)\par  ME: Positive (%, moderate nuclear intensity staining)\par  HER-2 by IHC: Equivocal (score 2+)\par  Ki-67: 10-20%\par  E-cadherin: Positive; supports the ductal phenotype\par  P63 and Calponin: Negative\par  \par  Age at Menarche - 10\par  Age at Menopause - 49\par  Age at first pregnancy - 21\par  Total number of pregnancies - 3\par  Breast feeding - yes\par  OC pills - none \par  HRT - yes, couple of years\par  \par  FHx\par  M. cousin with breast cancer in her 30s - \par  \par  Social History\par  Smoked, quit in 1960s.\par  Alcohol - no\par  Illicit drugs - no\par  Work -retired, teacher and  \par  Lives with her  \par  \par  She has been on Prolia for two years, held since 2022 due to dental procedure in 2022. \par  \par  She is s/p right partial mastectomy with SNLB (23) with Dr Harding\par  Pathology\par  IDC, 14 mm, G2\par  Pathological stage IA ([id number]). 2 sentinel nodes negative\par  Ogd3djl negative by FISH\par  \par   [de-identified] : Patient is seen today for follow up Started tamoxifen (3/20/23 -8/2/2023) Discontinued due to DVT/PE and subsequently found to have  protein S deficiency/heterozygous prothromobin gene  She's doing well and has no complaints.

## 2024-04-04 NOTE — PHYSICAL EXAM
[Ambulatory and capable of all self care but unable to carry out any work activities] : Status 2- Ambulatory and capable of all self care but unable to carry out any work activities. Up and about more than 50% of waking hours [Normal] : grossly intact [de-identified] : Left greater than right lower extremity edema

## 2024-04-04 NOTE — ASSESSMENT
[Designated Health Care Proxy] : Designated Health Care Proxy [Name: ___] : Name: [unfilled] [Relationship: ___] : Relationship: [unfilled] [DNR] : DNR [FreeTextEntry1] : ## Right breast IDC Found on screening mammogram- 1.2 cm Clinical stage IA ([id number]) ER (%) NM (%) positive, Bse0qkl negative by FISH. KI67- 10-20% s/p right partial mastectomy with SNLB (2/23/23) with Dr Harding Pathological stage IA ([id number]).IDC, 14 mm, G2, 2 sentinel nodes negative Qgd4inc negative by FISH Mammaprint (done on biopsy)- Luminal A, low risk She saw Dr Timo Leon at [facility] who recommended against radiation. 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 Given her thrombophlia work up as below and PE/DVT while being on Tamoxifen, patient is advised to discontinue Tamoxifen indefinitely.  She has underlying osteoporosis and has not had Prolia with Dr. Hellerman for over a year. Now scheduling for [facility] spine. To follow up with .  She opted for no endocrine therapy due to her worsening osteoporosis.  -Labs are drawn in the office, reviewed, analyzed, and discussed COntinue with surveillance follow up  ## Left lower extremity DVT (8/2023) Multiple left PE h/o left LE DVT (~2018), unprovoked , took xarelto x a little over 12 months Son- "blood clots- unsure if DVT or PE or both) he is "semi-estranged" No miscarriages Work up with heterozygous prothrombin gene Advised on indefinite long-term anticoagulation  ## Constipation 9/2023 CT scan A/P - no acute findings.  ## Osteoporosis DEXA (11/2022)- Comparison is made to most recent previous at 2020 and reveals no statistically significant changes in the hip or wrist but a statistically significant increase bone mineral density of 9.2% in the spine which may reflect advancing DJD Follows with Dr Hellerman On Prolia Q6M (9/2020-3/2022) follows up with Dr Hellerman for management  Social hx Lives in [place] with  (Juan Alberto Marino) Was  and  Retired Smoked for a few years during college Occasional alcohol intake  Patient had multiple questions which were answered to satisfaction  d/w Dr. Juarez  Follow up in 6 months GAL, ELGIN, vitmain D.     [FreeTextEntry2] : [place] - [phone] Juan Alberto - [phone]

## 2024-05-15 RX ORDER — RIVAROXABAN 20 MG/1
20 TABLET, FILM COATED ORAL
Qty: 30 | Refills: 3 | Status: ACTIVE | COMMUNITY
Start: 2023-08-10 | End: 1900-01-01

## 2024-07-25 NOTE — DISEASE MANAGEMENT
Anesthesia Pre Eval Note    OB/Gyn Evaluation    OB/Gyn:  Patient is pregnant now.  (+) , section - primary,   Pregnancy Associated Diseases:           Anesthesia ROS/Med Hx          Neuro/Psych Review:       Positive for psychiatric history    End/Other Review:    Positive for chronic pain      Relevant Problems   No relevant active problems       Physical Exam       Cardiovascular  Cardiovascular exam normal  Cardio Rhythm: Regular  Cardio Rate: Normal    Head Assessment  Head assessment: Normocephalic and Atraumatic    General Assessment  General Assessment: Alert and oriented and No acute distress    Dental Exam  Dental exam normal    Pulmonary Exam  Pulmonary exam normal    Abdominal Exam    Patient Demonstrates:  Gravid      Anesthesia Plan:    ASA Status: 3  Anesthesia Type: L&D Epidural    Checklist  Reviewed: Lab Results, Allergies, Medications, Problem list, Past Med History and Anesthesia Record     [Clinical] : TNM Stage: c [FreeTextEntry4] : Invasive Ductal Carcinoma of the Right Breast ER/AR+ [TTNM] : 1c [NTNM] : 0 [MTNM] : X [IA] : IA

## 2024-08-09 ENCOUNTER — APPOINTMENT (OUTPATIENT)
Dept: ENDOCRINOLOGY | Facility: CLINIC | Age: 84
End: 2024-08-09

## 2024-08-09 PROCEDURE — 99215 OFFICE O/P EST HI 40 MIN: CPT

## 2024-08-09 NOTE — HISTORY OF PRESENT ILLNESS
[FreeTextEntry1] : Aug 09, 2024          10/2015:  bilaeral stones   5/2018  nodules including poasterior;   12/2018  Sestamibi - neg                                                        vit D good range.   Plan :  Restart Prolia    +Mar 05, 2024    in person  PCP:     Dr. Catie Mcnally             Gyn: Dr. Caity Reynolds             Urol: Dr. Uriel Alex (stones - one episode)              Oral surgeon: Dr. Nugent - dental implants             H/O:   Dr. Rosemary Juarez  - breast cancer -> Tamox     Stopped post DVT and found protein S defic.  now Xaralto.            .            CC: Osteoporosis (prev. Forteo): Fosamax Jan 2014 2/23/2018  CTX s 383  ionized Ca  5.9 (<5.6) [facility]                               1/31/2019:  no compression fractures.  Low vit D                                  12/18/2018:  BD [facility] Spine T -3.0, TH -1.4, FN -2.2, forearm -2.5                               11/8/19 hypercalcemia  Ca 10.5 (8.6 - 10.4) [facility]  CTXs 429  Vit D 30 (no PTH)                                   4/30/20 [facility]:  PTH 94;  CTXs 598                                12/21/2020:  BD Lin  Spine T -3.1                   Thyroid nodule  2.3 cm L, FNAB 6/2017 [facility] - benign- low cellularity                   Kidney stones  10/2015 US Kidney - asymptomatic - saw Dr. Alex                    Hypercalciuria   (5/2020 calcium 302 mg/w hrs at [facility])   84 yo - still goes to oral surgeon - had to skip a Prolia. - last 1 1/2 yrs ago   Last saw end of July. 2023 Has occasional elevated PTH (US thyroid, sestamibi neg in 2018) Has history of possible asymptomatic kidney stone.  Lab tests from 11/2023 included  calcium 10.7 **           Vit D usually mid 30's   9/3/2021 PTH 78, calcium 10.2   : : Constitutional:  Alert, well nourished, healthy appearance, no acute distress  Eyes:  No proptosis, no stare Thyroid: Pulmonary:  No respiratory distress, no accessory muscle use; normal rate and effort Cardiac:  Normal rate Vascular:  Endocrine:  No stigmata of Cushings Syndrome Musculoskeletal:  Normal gait, no involuntary movements Neurology:  No tremors Affect/Mood/Psych:  Oriented x 3; normal affect, normal insight/judgement, normal mood  . Imp:  Dental and other issues determined last Prolia about 1.5  yrs ago.  Has hypercalciuria, likely hyperparathyroidism, worsening osteoporosis of the spine.  Plan:  X-ray spine Updated labs. Consider for Evenity [id number] by Prolia or Reclast.        Oct 03, 2023      nip                 PCP:     Dr. Catie Mcnally             Gyn: Dr. Caity Reynolds             Urol: Dr. Uriel Alex (stones - one episode)              Oral surgeon: Dr. Nugent - dental implants             H/O:   Dr. Rosemary Juarez  - breast cancer -> Tamox            .            CC: Osteoporosis (prev. Forteo): Fosamax Jan 2014 2/23/2018  CTX s 383  ionized Ca  5.9 (<5.6) [facility]                               1/31/2019:  no compression fractures.  Low vit D                                  12/18/2018:  BD Lin Spine T -3.0, TH -1.4, FN -2.2, forearm -2.5                               11/8/19 hypercalcemia  Ca 10.5 (8.6 - 10.4) [facility]  CTXs 429  Vit D 30 (no PTH)                                   4/30/20 [facility]:  PTH 94;  CTXs 598                                12/21/2020:  BD Lin  Spine T -3.1                   Thyroid nodule  2.3 cm L, FNAB 6/2017 [facility] - benign- low cellularity                   Kidney stones  10/2015 US Kidney - asymptomatic - saw Dr. Alex                    Hypercalciuria   (5/2020 calcium 302 mg/w hrs at [facility])   81 yo -  visits for osteoporosis of spine (no compressions 2020 X-ray); hyperalciuria; likely hyperparathyroidism (negative sestamibi scah)      still goes to oral surgeon - had to skip a Prolia. Last saw end of July.  Has occasional elevated PTH (US thyroid, sestamibi neg in 2018) Has history of possible asymptomatic kidney stone.    Was on Tamoxifen, now Xaralto.      Had oral surgery a year ago - last surgery with Dr. Nugent in July    will see him again in November.     BD in November.   Taking vit D   was 33 in March 2023.      Imp:  No fractures;  no falls.       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   Will continue same Rx.     -    Mar 29, 2023    in person  PCP:     Dr. Catie Mcnally             Gyn: Dr. Caity Reynolds             Urol: Dr. Uriel Alex (stones - one episode)              Oral surgeon: Dr. Nugent - dental implants             H/O:   Dr. Rosemary Juarez  - breast cancer -> Tamox            .            CC: Osteoporosis (prev. Forteo): Fosamax Jan 2014 2/23/2018  CTX s 383  ionized Ca  5.9 (<5.6) [facility]                               1/31/2019:  no compression fractures.  Low vit D                                  12/18/2018:  BD Lin Spine T -3.0, TH -1.4, FN -2.2, forearm -2.5                               11/8/19 hypercalcemia  Ca 10.5 (8.6 - 10.4) [facility]  CTXs 429  Vit D 30 (no PTH)                                   4/30/20 [facility]:  PTH 94;  CTXs 598                                12/21/2020:  BD Lin  Spine T -3.1                   Thyroid nodule  2.3 cm L, FNAB 6/2017 [facility] - benign- low cellularity                   Kidney stones  10/2015 [facility] Kidney - asymptomatic - saw Dr. Alex                    Hypercalciuria   (5/2020 calcium 302 mg/w hrs at [facility])   81 yo - still goes to oral surgeon - had to skip a Prolia. Last saw end of July.  Has occasional elevated PTH (US thyroid, sestamibi neg in 2018) Has history of possible asymptomatic kidney stone.  : : Constitutional:  Alert, well nourished, healthy appearance, no acute distress  Eyes:  No proptosis, no stare Thyroid: Pulmonary:  No respiratory distress, no accessory muscle use; normal rate and effort Cardiac:  Normal rate Vascular:  Endocrine:  No stigmata of Cushing's Syndrome Musculoskeletal:  Normal gait, no involuntary movements Neurology:  No tremors Affect/Mood/Psych:  Oriented x 3; normal affect, normal insight/judgement, normal mood  .  Imp:  Can restart the Prolia for now and will consider for Reclast eventually.        Jun 08, 2022      iPhone  PCP:     Dr. Catie Mcnally             Gyn: Dr. Caity Reynolds             Urol: Dr. Uriel Alex (stones - one episode)              Oral surgeon: Dr. Nugent - dental implants            .            CC: Osteoporosis (prev. Forteo): Fosamax Jan 2014 2/23/2018  CTX s 383  ionized Ca  5.9 (<5.6) [facility]                               1/31/2019:  no compression fractures.  Low vit D                                  12/18/2018:  BD Lin Spine T -3.0, TH -1.4, FN -2.2, forearm -2.5                               11/8/19 hypercalcemia  Ca 10.5 (8.6 - 10.4) [facility]  CTXs 429  Vit D 30 (no PTH)                                   4/30/20 [facility]:  PTH 94;  CTXs 598                                12/21/2020:  BD Lin  Spine T -3.1                   Thyroid nodule  2.3 cm L, FNAB 6/2017 [facility] - benign- low cellularity                   Kidney stones  10/2015 US Kidney - asymptomatic - saw Dr. Alex                    Hypercalciuria   (5/2020 calcium 302 mg/w hrs at [facility])   Last Prolia 9/27/2021 and so next likely 3/27/2022 - Rx sent and she will also call Getting calcium in diet and taking 1000 iu vit D daily    Moste recent Prolia 3/28 and next is 9/28/22  After htat will be 3/2023 so next visit around January 2023 Feb 02, 2022       iPhone (she resides at [facility])  PCP:     Dr. Catie Mcnally             Gyn: Dr. Caity Reynolds             Urol: Dr. Uriel Alex (stones - one episode)              Oral surgeon: Dr. Nugent - dental implants            .            CC: Osteoporosis (prev. Forteo): Fosamax Jan 2014 2/23/2018  CTX s 383  ionized Ca  5.9 (<5.6) [facility]                               1/31/2019:  no compression fractures.  Low vit D                                  12/18/2018:  BD Lin Spine T -3.0, TH -1.4, FN -2.2, forearm -2.5                               11/8/19 hypercalcemia  Ca 10.5 (8.6 - 10.4) [facility]  CTXs 429  Vit D 30 (no PTH)                                   4/30/20 [facility]:  PTH 94;  CTXs 598                                12/21/2020:  BD Lin  Spine T -3.1                   Thyroid nodule  2.3 cm L, FNAB 6/2017 [facility] - benign- low cellularity                   Kidney stones  10/2015 US Kidney - asymptomatic - saw Dr. Alex                    Hypercalciuria   (5/2020 calcium 302 mg/w hrs at [facility])   Last Prolia 9/27/2021 and so next likely 3/27/2022 - Rx sent and she will also call Getting calcium in diet and taking 1000 iu vit D daily    Imp:  Doing well, no falls. Plan:  Next Prolia ~ 3/27/2022 followed by ~ 9/27/2022 and ROV here in 6/2022 and then more labs.    Sep 03, 2021        iPhone    at [facility]  PCP:     Dr. Catie Mcnally             Gyn: Dr. Caity Reynolds             Urol: Dr. Uriel Alex (stones - one episode)              Oral surgeon: Dr. Nugent - dental implants            .            CC: Osteoporosis (prev. Forteo): Fosamax Jan 2014 2/23/2018  CTX s 383  ionized Ca  5.9 (<5.6) [facility]                               1/31/2019:  no compression fractures.  Low vit D                                  12/18/2018:  BD Lin Spine T -3.0, TH -1.4, FN -2.2, forearm -2.5                               11/8/19 hypercalcemia  Ca 10.5 (8.6 - 10.4) [facility]  CTXs 429  Vit D 30 (no PTH)                                   4/30/20 [facility]:  PTH 94;  CTXs 598                                12/21/2020:  BD Lin  Spine T -3.1                   Thyroid nodule  2.3 cm L, FNAB 6/2017 [facility] - benign- low cellularity                   Kidney stones  10/2015 US Kidney - asymptomatic - saw Dr. Alex                    Hypercalciuria   (5/2020 calcium 302 mg/w hrs at [facility])  Visits for osteoporosis (also thyroid nodule, hypercalciuria, hyperparathyroid - mostly normocalcemic, neg sestamibi; Hx low vitamin D) Recently received second Pfizer Covid vaccine.  6/3/2021 labs at [facility] included calcium 10.7 (< 10.5) PTH 43  (had been 71 in October 2020 when calcium was 9.7)  Treated with Forteo followed by alendronate.   After holiday from alendronate, started on Prolia #1 on Feb 19, 2021 She is on vit D 1000 iu daily.   Prolia #2 could have been Aug 19.    Imp: Osteoporosis of spine.    Next BD late Dec 2022   Prolia #2 can be now after updated vitamin D level.   ROV within 5 months     Feb 11, 2021   phone i     [phone]   PCP:     Dr. Catie Mcnally             Gyn: Dr. Caity Reynolds             Urol: Dr. Uriel Alex (stones - one episode)              Oral surgeon: Dr. Nugent - dental implants            .            CC: Osteoporosis (prev. Forteo): Fosamax Jan 2014 2/23/2018  CTX s 383  ionized Ca  5.9 (<5.6) [facility]                               1/31/2019:  no compression fractures.  Low vit D                                  12/18/2018:  BD Lin Spine T -3.0, TH -1.4, FN -2.2, forearm -2.5                               11/8/19 hypercalcemia  Ca 10.5 (8.6 - 10.4) [facility]  CTXs 429  Vit D 30 (no PTH)                   Thyroid nodule  2.3 cm L, FNAB 6/2017 [facility] - benign- low cellularity                   Kidney stones  10/2015 US Kidney - asymptomatic - saw Dr. Alex                    Hypercalciuria  Visits for osteoporosis (also thyroid nodule, hyperparathroid) Recenty received second Pfizer Covid vaccine.  Has not required any oral surgery in past few years. She used to have low vitamin D, now takes 1000 iu daily Spine T -3.1, Z -0.6.   T down from previus T of -3.0  Impression:  Not good candidate for parathyroid surgery. Plan:  Consider for Prolia      Nov 25, 2020    in person  PCP:     Dr. Catie Mcnally             Gyn: Dr. Caity Reynolds             Urol: Dr. Uriel Alex (stones - one episode)              Oral surgeon: Dr. Nugent - dental implants            .            CC: Osteoporosis (prev. Forteo): Fosamax Jan 2014 2/23/2018  CTX s 383  ionized Ca  5.9 (<5.6) [facility]                               1/31/2019:  no compression fractures.  Low vit D                                  12/18/2018:  BD Lin Spine T -3.0, TH -1.4, FN -2.2, forearm -2.5                               11/8/19 hypercalcemia  Ca 10.5 (8.6 - 10.4) [facility]  CTXs 429  Vit D 30 (no PTH)                   Thyroid nodule  2.3 cm L, FNAB 6/2017 [facility] - benign- low cellularity                   Kidney stones  10/2015 US Kidney - asymptomatic - saw Dr. Alex                    Hypercalciuria  Saw her oral surgeon, Dr. Nugent, in the Spring.   No tooth extractions or dental implants planned in foreseeable future. 4/30/20  [facility] included calcium 10.1 PTH 94 osteocalcin 27  vit D 30 1,25 82 (18-72) 24 hour urine calcium 255,   creat 0.84 gm  : : Constitutional:  Alert, well nourished, healthy appearance, no acute distress  Eyes:  No proptosis, no lid lag Thyroid: Pulmonary:  No respiratory distress, no accessory muscle use; normal rate and effort Cardiac:  Normal rate Vascular:  Endocrine:  No stigmata of Cushing's Syndrome Musculoskeletal:  Normal gait, no involuntary movements Neurology:  No tremors Affect/Mood/Psych:  Oriented x 3; normal affect, normal insight/judgement, normal mood  .  Impression:  Osteoporosis of spine.    No compression or other fractures. Hypercalciuria with normocalcemic hyperparathyroidism.    Impression:  She is eligible for follow up bone density after December 18, 2020 and will likely go for Prolia injection after that.     October 20, 2020 labs at [facility] included TSH 1.99 B12 491 PTH 71 (15-65) calcium 9.7       May 05, 2020  Telephone Visit  PCP:     Dr. ZEB Saleh             Gyn: Dr. Caity Reynolds             Urol: Dr. Uriel Alex (stones - one episode)              Oral surgeon: Dr. Nugent - dental implants            .            CC: Osteoporosis (prev. Forteo): Fosamax Jan 2014 2/23/2018  CTX s 383  ionized Ca  5.9 (<5.6) [facility]                               1/31/2019:  no compression fractures.  Low vit D                                  6/27/19 [facility] Spine T -3.0, TH -1.4, FN -2.2, forearm -2.5                               11/8/19 hypercalcemia  Ca 10.5 (8.6 - 10.4) [facility]  CTXs 429  Vit D 30 (no PTH)                   Thyroid nodule  2.3 cm L, FNAB 6/2017 [facility] - benign- low cellularity                   Kidney stones  10/2015 US Kidney - asymptomatic   No history of fracture.  Spine T score -3.0  12/18/2018  Recent labs include 24 hour urine calcium of 255 mg 25 OH vit D 30 on ~1000 iu daily. PTH 94 (<94)  Labs at [facility] 11/8/2019 included calcium 10.5    Impression:  Trajectory of spine bone density previously improved, but appears to have plateaued and perhaps may be heading back down.  Not an ideal candidate for PTX.   May consider Prolia in the future.    Impression:  On "holiday" from alendronate.     Nov 12, 2019  PCP:     Dr. ZEB Saleh             Gyn: Dr. Caity Reynolds             Urol: Dr. Uriel Alex (stones - one episode)              Oral surgeon: Dr. Nugent - dental implants            .            CC: Osteoporosis (prev. Forteo): Fosamax Jan 2014 2/23/2018  CTX s 383  ionized Ca  5.9 (<5.6) [facility]                               1/31/2019:  no compression fractures.  Low vit D                                  6/27/19 BD Lin Spine T -3.0, TH -1.4, FN -2.2, forearm -2.5                               11/8/19 hypercalcemia  Ca 10.5 (8.6 - 10.4) [facility]  CTXs 429  Vit D 30 (no PTH)                   Thyroid nodule  2.3 cm L, FNAB 6/2017 [facility] - benign- low cellularity                   Kidney stones  10/2015 US Kidney - asymptomatic    #  Will ask her to have f/u US thyroid within next year.  #  Osteoporos of spine          Has been on "holiday" from alendronate.            Impression:  Previously diagnosed with normocalcemic hyperparathryoidism; however, most recent           [facility] calcium slightly elevated at 10.5, normal up to 10.4, with normal renal function and          25 OH vitmin D level of 30  (but no PTH)            X-ray spine neg:               Lumbosacral Spine.  Frontal, lateral and cone-down projections demonstrate satisfactory alignment of the bony structures. No fracture or vertebral compression can be appreciated. There is no evidence of pedicle destruction. Intervertebral disc spaces are satisfactorily maintained. The sacroiliac joints are symmetric. As compared to the patient's previous study of 9/29/2014, there has been no significant change of an adverse nature.   IMPRESSION: No evidence of lumbar compression deformity. No significant change from 9/29/2014.   ***Electronically Signed *** ----------------------------------------------- Wojciech Biggs MD              01/31/19 1822   IMPRESSION:  No focal site of initial increased and persistent sestamibi tracer localization is evident to specifically suggest uptake in parathyroid adenoma or hyperplasia.  The left thyroid lobe is asymmetrically much larger than the right with much more intense tracer localization.     ***Electronically Signed *** ----------------------------------------------- Donovan Hollis MD              12/06/18   Imp:  Sestamibi negative.       Plan:  24 hour Ca at [facility] with PTH, vit D before next visit in 6 months - will likely decide on Prolia at that time; however, will also reconsider evaluation of parathyroid.       June 27, 2019  PCP:     Dr. ZEB Saleh (to see) ******             Gyn: Dr. Caity Reynolds             Urol: Dr. Uriel Alex (stones - one episode)              Oral surgeon: Dr. Nugent - dental implants            .            CC: Osteoporosis (prev. Forteo): Fosamax Jan 2014  77 yo on "holiday" from Fosamax.   Lab tests from yesterday at [facility] include  TSH 1.81 25 OH vit D 24  Lumbar spine, L1 to L2, total: 0.647 gm./cm.2.  T-score: -3.0  Z-score: -0.6  Left hip: 0.767 gm./cm.2.  T-score: -1.4   Z-score: 0.6  Left femoral neck: 0.606  T score: -2.2  Z score: 0.1   Left forearm, distal third: 0.544  T score: -2.5  Z score: 0.5  On holiday from alendronate.  Last dental implant a few weeks ago.   After a bone graft ! No other implants on the horizon. Plan:  Updated labs late Nov, ROV Dec. Likely Prolia next time around    January 31, 2019  PCP: Dr. Supriya Segovia/Dr. Wojciech Lee             Gyn: Dr. Caity Reynolds             Urol: Dr. Uriel Alex (stones - one episode)              Oral surgeon: Dr. Nugent - dental implants            .            CC: Osteoporosis (prev. Forteo): Fosamax Jan 2014             Last BD: 12/12/2016             Asymptomatic kidney stones **             Hypercalciuria **             (Lyme )             History of partial thyroid lobectomy;             Thyroid nodules (sono 12/12/2016)             -FNAB 6/7/17 [facility] of 2.3 cm L nodule: neg              History of I-131 for hyperthyroidism             new: DVT LLE 6/2017.            " IMPRESSION: No significant change in the appearance of the thyroid since 12/12/2016, including a             previously biopsied left lower pole nodule, and an hypoechoic nodule at the posterior aspect of the             left upper pole that could be either exophytic or extrathyroidal (parathyroid). In view of the             history of hyperparathyroidism, follow-up radionuclide parathyroid scintigraphy can be performed             for further more specific evaluation in this regard.             ***Electronically Signed ***             -----------------------------------------------             Donovan Hollis MD 05/15/18    Recent bone density shows spine T -3.1 and trending down. Ultrasound thyroid, no significant change since 12/12/2016 including previously biopsied nodule.   Sestamibi parathyroid scan : negative   Recent [facility] ionized calcium 5.7 (4.9 - 5.6) ** PTH 73 (14-64)  CTXs  653 osteocalcin 24 phos 3.3 TSH 2.88 vit D 28  No compression fx in 2014.  Imp:  Normocalcemic hyperparathyroid. Would benefit from antiresorptive. Consider Evenity when available. ROV 6 months   Previous notes from [facility] appended below.   June 22, 2018            .            PCP: Dr. Supriya Segovia/Dr. Wojciech Lee             Gyn: Dr. Caity Reynolds             Urol: Dr. Uriel Alex (stones - one episode)              Oral surgeon: Dr. Nugent - dental implants            .            CC: Osteoporosis (prev. Forteo): Fosamax Jan 2014             Last BD: 12/12/2016             Asymptomatic kidney stones **             Hypercalciuria **             (Lyme )             History of partial thyroid lobectomy;             Thyroid nodules (sono 12/12/2016)             -FNAB 6/7/17 [facility] of 2.3 cm L nodule: neg              History of I-131 for hyperthyroidism             new: DVT LLE 6/2017.            " IMPRESSION: No significant change in the appearance of the thyroid since 12/12/2016, including a             previously biopsied left lower pole nodule, and an hypoechoic nodule at the posterior aspect of the             left upper pole that could be either exophytic or extrathyroidal (parathyroid). In view of the             history of hyperparathyroidism, follow-up radionuclide parathyroid scintigraphy can be performed             for further more specific evaluation in this regard.             ***Electronically Signed ***             -----------------------------------------------             Doonvan Hollis MD 05/15/18             .            .            Impression: Based on above, I will ask her to stop by [facility] for sestamibi parathroid scan and to return after that.            can then decide on when and if to start another round of treatment for the osteoporosis.             .            ==            .            Feb 2, 2018            .            PCP: Dr. Supriya Segovia/Dr. Wojciech Lee             Gyn: Dr. Caity Reynolds             Urol: Dr. Uriel Alex (stones - one episode)              Oral surgeon: Dr. Nugent - dental implants            .            CC: Osteoporosis (prev. Forteo): Fosamax Jan 2014             Last BD: 12/12/2016             Asymptomatic kidney stones **             Hypercalciuria **             (Lyme )             History of partial thyroid lobectomy;             Thyroid nodules (sono 12/12/2016)             -FNAB 6/7/17 [facility] of 2.3 cm L nodule: neg              History of I-131 for hyperthyroidism             new: DVT LLE 6/2017.            .            Stopped a/c two weeks ago.            Stopped Fosamax end of Dec 2017 after 4 years.             .             Labs ([facility]) from             1/23/2018            TSH 2.66            calcium 10.3            ionized calcium 5.9 (4.8 - 5.6) ***            phos 3.4            PTH 64 (14-64) ***            25 OH vit D            CTXs 383 ****            .            Impression: Took two years of Forteo, 4 years alendronate, started "holiday" recently, has dental imiplants, eligible for BD Dec 2018 and will likely benefit from:            -sestamibi parathyroid scan            -Prolia after next BD             - need fu u/s thyroid around April and RDV May.             .            ==            .            Sept 15, 2017            .            PCP: Dr. Supriya Segovia             Gyn: Dr. Caity Reynolds             Urol: Dr. Uriel Alex (stones - one episode)             .            CC: Osteoporosis (prev. Forteo): Fosamax Jan 2014             Last BD: 12/12/2016             Asymptomatic kidney stones **             Hypercalciuria **             (Lyme )             History of partial thyroid lobectomy;             Thyroid nodules (sono 12/12/2016)             History of I-131 for hyperthyroidism             new: DVT LLE 6/2017.            .            Went for FNAB thyroid nodule at [facility] - cytology read as benign            .            Bone density stable and December 2017 she will have been on Fosamax for 4 years, so will advise "holiday".            .            Now on Xaralto because of L DVT:            .            .            "6/23/2017            IMPRESSION: Positive DVT study with thrombus extending from the posterior tibial vein into the popliteal vein."            .            My impression:             Labs at [facility] on             9/11/2017 included             calcium 10.2 (8.6-10.4)            phos 3.2            PTH 73 (14-64)            TSH 2.55             25 OH vit D 28            CTXs 282 (on alendronate)             .            Impression:             FNAB thyroid at [facility] (after first at [facility]") reassuring.            .             Normocalcemic hyperparathyroidism.            Bone density stable.            Can go on "holiday" from alendronate.            Monitor calcium/PTH            ROV February.             Next BD ~Dec 2019            .            ==            .            May 5, 2017            .            PCP: Dr. Supriya Segovia             Gyn: Dr. Caity Reynolds             Urol: Dr. Uriel Alex (stones - one episode)             .            CC: Osteoporosis (prev. Forteo): Fosamax Jan 2014             Last BD: 12/12/2016             Asymptomatic kidney stones **             Hypercalciuria **             (Lyme )             History of partial thyroid lobectomy;             Thyroid nodules (sono 12/12/2016)             History of I-131 for hyperthyroidism             .            .            Imp/Plan:            .            Biopsy of dominant thyroid nodule at [facility] was not successful.            Went to [facility] for sonogram and Dr. Phipps also advised FNAB so will ask her to try there.             .            Bone density seems to have improved on Fosamax. End of this year will be four years and a reasonable time to consider a "holiday" if she and the rest of her team agree.            .            Monitor vit D and PTH level.            ROV in September.             .            .            .            ==            .            March 3, 2017            .            PCP: Dr. Supriya Segovia             Gyn: Dr. Caity Reynolds            .            CC: Osteoporosis (prev. Forteo): Fosamax Jan 2014             Last BD: 12/12/2016             Asymptomatic kidney stones              Hypercalciuria              (Lyme )             History of partial thyroid lobectomy;             Thyroid nodules (sono 12/12/2016)             History of I-131 for hyperthyroidism            .            Visited [facility] and enjoyed it.             .            Labs ([facility])            24 hour urine collection             1 g creatinine             calcium 287 (<250)            .            creatinine 0.81            uric acid 4.2            serum calcium 10.4 (8.6-10.4)             PTH 60             25 OH vit D 27             CTXs 278            .            Bone density stable            Teeth stable            Thyroid sono advises FNAB of nodule (she is s/p partial lobectomy in [facility] and I-131 for hyperthyroidism several years ago)            .            Impression: Because of evidence of possible normocalcemic hyperparathyroidism, she went for ultrasound of thyroid/parathyroid and nodules detected with recommendation for FNAB             .            Osteoporosis currently stable.             .            Plan: ROV in two months (after FNAB of thyroid nodule)            .            ==            .;            October 7, 2016            .            PCP: Dr. Supriya Segovia             Gyn: Dr. Caity Reynolds            .            CC: Osteoporosis (prev. Forteo): Fosamax Jan 2014             (Lyme last             .            Dental issues are quiet            Remains on alendronate weekly.            .            Blood tests ([facility]) from             3/18/2016 inclued            BS 87 mg/dl            calcium 9.1            phos 3.1            TSH 2.28             Vit B12 367            PTH 78 (14-65)             creat 0.79            vit D 35            ionized calcium - normal             .            Did not yet go for BD.            For problem with urination saw Uriel at [facility].             sono showed bilateral tiny kidney stones.             stopped calcium supp after that.             (mother had stones)            .            Plan: sono thyroid            repeat PTH            24 hour urine ca/stone former            bone density            ROV Feb            continue Fosamax            .            ==            .            March 18, 2016            .            PCP: Dr. Supriya Segovia             Gyn: Dr. Caity Reynolds            .            CC: Osteoporosis (prev. Forteo): Fosamax Jan 2014             (Lyme last             .            Had bone grafting lower part of her mouth - last September.            .            Plan: Updated labs now.            ROV late October after BD            .            ==            .            June 5, 2015            .            PCP: Dr. Supriya Segovia             Gyn: Dr. Caity Reynolds            .            CC: Osteoporosis (prev. Forteo): Fosamax Jan 2014             (Lyme last             .            [facility]            .            Recent [facility] labs show 25 OH vit 41            CTXs 148            ionized calcium 5.7 (4.8-5.6)             .            Impression: Took Forteo for two years and has been on Fosamax since Jan 2014.            .            Impression: Dental issues are quiet. Serial bone densities show considerable improvement over time, especially in her spine. Last BD Sept 2014 showed spine T -2.6 Z -0.3.            .            Plan: Continue Fosamax for 3-5 years.            .Next BD ~ Oct 2016.            ROV here in March.            .            .            ===            October 28, 2014            PCP: Dr. Supriya Segovia            CC: Osteoporosis (prev. Forteo)            .            Treated for Lyme over the summer.            On Fosamax since Jan 2014.            April 2014 CTXs 192 (10/2013 CTXs 341).             vit D 36            .            Much of improvement in bone density was likely from the Forteo and she is now holding her own with Fosamax.             .            Dental implant fine.            .            Plan: Continue the Fosamax for up to 3 years and then consider Prolia.            ROV six months.             .            ===            April 8, 2014            PCP: Dr. Supriya Segovia            CC: Osteoporosois            .            Took Forteo for two years.            Because of dental issues preferred to take Calcitonin after that.            Switched to Fosamax around Januiary 2014.            Notes no symptoms from Fosamax.            Her densist, Dr. Nugent, is pleased with her progress.            She goes for teeth cleaning every 3 months.            .            Impression: Markers of bone turnover have decreased on Fosamax, predicting absorption and effectiveness.            .            Plan: Same Rx. Follow up BD two years after last.             .            .            ====            October 28, 2013            PCP: Dr. Supriya Segovia            CC: Osteoporosis (occasion hypercalcemia); previous Forteo            .            Blood tests at [facility] in July showed calcium 10.3, PTH 47            CTXs 342.             BSA 18.1 (14.2 - 42.7)            .            Labs from today still pending.            Dr. Nugent said: "Dr. Hellerman can do whatever he wants now."            Has a 3 month supply of calcitonin.             Plan: After finish            .            ========            March 26, 2013 Returns for osteoporosis. Has not yet received approval from Dr. Nugent to have more aggressive treatment than calcitonin (such as Fosamax, Reclast, or Prolia). She will be seeing Dr. Nugent within 3 months and will get his approval. In the meatime, she will stay on the calcitonin nasal spray.             .            Had blood tests last week at [facility] in [place].     ROS:

## 2024-08-26 ENCOUNTER — RESULT REVIEW (OUTPATIENT)
Age: 84
End: 2024-08-26

## 2024-10-01 ENCOUNTER — RESULT REVIEW (OUTPATIENT)
Age: 84
End: 2024-10-01

## 2024-10-01 ENCOUNTER — APPOINTMENT (OUTPATIENT)
Dept: HEMATOLOGY ONCOLOGY | Facility: CLINIC | Age: 84
End: 2024-10-01
Payer: MEDICARE

## 2024-10-01 ENCOUNTER — APPOINTMENT (OUTPATIENT)
Dept: ENDOCRINOLOGY | Facility: CLINIC | Age: 84
End: 2024-10-01
Payer: MEDICARE

## 2024-10-01 ENCOUNTER — NON-APPOINTMENT (OUTPATIENT)
Age: 84
End: 2024-10-01

## 2024-10-01 VITALS
WEIGHT: 145 LBS | BODY MASS INDEX: 27.38 KG/M2 | HEART RATE: 53 BPM | SYSTOLIC BLOOD PRESSURE: 116 MMHG | OXYGEN SATURATION: 97 % | HEIGHT: 61 IN | DIASTOLIC BLOOD PRESSURE: 84 MMHG

## 2024-10-01 VITALS
RESPIRATION RATE: 16 BRPM | BODY MASS INDEX: 28.21 KG/M2 | WEIGHT: 149.44 LBS | DIASTOLIC BLOOD PRESSURE: 59 MMHG | SYSTOLIC BLOOD PRESSURE: 102 MMHG | TEMPERATURE: 97.7 F | OXYGEN SATURATION: 97 % | HEART RATE: 75 BPM | HEIGHT: 61 IN

## 2024-10-01 DIAGNOSIS — M81.0 AGE-RELATED OSTEOPOROSIS W/OUT CURRENT PATHOLOGICAL FRACTURE: ICD-10-CM

## 2024-10-01 DIAGNOSIS — Z17.0 MALIGNANT NEOPLASM OF UPPER-INNER QUADRANT OF RIGHT FEMALE BREAST: ICD-10-CM

## 2024-10-01 DIAGNOSIS — C50.911 MALIGNANT NEOPLASM OF UNSPECIFIED SITE OF RIGHT FEMALE BREAST: ICD-10-CM

## 2024-10-01 DIAGNOSIS — E04.1 NONTOXIC SINGLE THYROID NODULE: ICD-10-CM

## 2024-10-01 DIAGNOSIS — C50.211 MALIGNANT NEOPLASM OF UPPER-INNER QUADRANT OF RIGHT FEMALE BREAST: ICD-10-CM

## 2024-10-01 PROCEDURE — 36415 COLL VENOUS BLD VENIPUNCTURE: CPT

## 2024-10-01 PROCEDURE — 99215 OFFICE O/P EST HI 40 MIN: CPT

## 2024-10-01 PROCEDURE — G2211 COMPLEX E/M VISIT ADD ON: CPT

## 2024-10-01 RX ORDER — DENOSUMAB 60 MG/ML
INJECTION SUBCUTANEOUS
Refills: 0 | Status: ACTIVE | COMMUNITY

## 2024-10-01 NOTE — ASSESSMENT
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 KI67- 10-20% s/p right partial mastectomy with SNLB (2/23/23) with Dr Harding Pathological stage IA ([id number]).IDC, 14 mm, G2, 2 sentinel nodes negative Fia1gke negative by FISH Mammaprint (done on biopsy)- Luminal A, low risk She saw Dr Timo Leon at [facility] who recommended against radiation. 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 Given her thrombophlia work up as below and PE/DVT while being on Tamoxifen, patient is advised to discontinue Tamoxifen indefinitely. She has underlying osteoporosis and has not had Prolia with Dr. Hellerman for over a year. Now scheduling for AP spine. To follow up with Dr. Helerman She opted for no endocrine therapy due to her worsening osteoporosis.   Seen today for follow up Declined breast exam Explained that she need at least Q6-12 months clinical breast exam. Strongly advised her to follow up with Dr Harding. She prefers to have Dr Mcnally examine her Labs ordered, drawn in the office, reviewed, analyzed and discussed Due to mammogram in Nov 2024 which was ordered. Patient will call to discuss findings/results in a few days after the mammogram COntinue with surveillance follow up  ## Left lower extremity DVT (8/2023) Multiple left PE h/o left LE DVT (~2018), unprovoked , took xarelto x a little over 12 months Son- "blood clots- unsure if DVT or PE or both) he is "semi-estranged" No miscarriages Work up with heterozygous prothrombin gene Advised on indefinite long-term anticoagulation Advised to have family members tested  ## Constipation 9/2023 CT scan A/P - no acute findings.  ## Osteoporosis DEXA (11/2022)- Comparison is made to most recent previous at 2020 and reveals no statistically significant changes in the hip or wrist but a statistically significant increase bone mineral density of 9.2% in the spine which may reflect advancing DJD Follows with Dr Hellerman On Prolia Q6M (9/2020-3/2022) follows up with Dr Hellerman for management  Social hx Lives in [place] with  (Juan Alberto Marino) Was  and  Retired Smoked for a few years during college Occasional alcohol intake  Patient had multiple questions which were answered to satisfaction  Follow up in 6 months CBC, CMP, vitmain D.     [FreeTextEntry2] : [place] - [phone] Juan Alberto - [phone]

## 2024-10-01 NOTE — PHYSICAL EXAM
[Ambulatory and capable of all self care but unable to carry out any work activities] : Status 2- Ambulatory and capable of all self care but unable to carry out any work activities. Up and about more than 50% of waking hours [Normal] : affect appropriate [de-identified] : Left greater than right lower extremity edema [de-identified] : Declined. prefers to have her PCP examine her

## 2024-10-01 NOTE — ASSESSMENT
[Designated Health Care Proxy] : Designated Health Care Proxy [Name: ___] : Name: [unfilled] [Relationship: ___] : Relationship: [unfilled] [DNR] : DNR [FreeTextEntry1] : ## Right breast IDC Found on screening mammogram- 1.2 cm Clinical stage IA ([id number]) ER (%) CO (%) positive, Elc3duw negative by FISH. KI67- 10-20% s/p right partial mastectomy with SNLB (2/23/23) with Dr Harding Pathological stage IA (wJ3ptT1).IDC, 14 mm, G2, 2 sentinel nodes negative Sgv6mlm negative by FISH Mammaprint (done on biopsy)- Luminal A, low risk She saw Dr Timo Leon at [facility] who recommended against radiation. 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 Given her thrombophlia work up as below and PE/DVT while being on Tamoxifen, patient is advised to discontinue Tamoxifen indefinitely. She has underlying osteoporosis and has not had Prolia with Dr. Hellerman for over a year. Now scheduling for AP spine. To follow up with Dr. Helerman She opted for no endocrine therapy due to her worsening osteoporosis.   Seen today for follow up Declined breast exam Explained that she need at least Q6-12 months clinical breast exam. Strongly advised her to follow up with Dr Harding. She prefers to have Dr Mcnally examine her Labs ordered, drawn in the office, reviewed, analyzed and discussed Due to mammogram in Nov 2024 which was ordered. Patient will call to discuss findings/results in a few days after the mammogram COntinue with surveillance follow up  ## Left lower extremity DVT (8/2023) Multiple left PE h/o left LE DVT (~2018), unprovoked , took xarelto x a little over 12 months Son- "blood clots- unsure if DVT or PE or both) he is "semi-estranged" No miscarriages Work up with heterozygous prothrombin gene Advised on indefinite long-term anticoagulation Advised to have family members tested  ## Constipation 9/2023 CT scan A/P - no acute findings.  ## Osteoporosis DEXA (11/2022)- Comparison is made to most recent previous at 2020 and reveals no statistically significant changes in the hip or wrist but a statistically significant increase bone mineral density of 9.2% in the spine which may reflect advancing DJD Follows with Dr Hellerman On Prolia Q6M (9/2020-3/2022) follows up with Dr Hellerman for management  Social hx Lives in [place] with  (Juan Alberto Marino) Was  and  Retired Smoked for a few years during college Occasional alcohol intake  Patient had multiple questions which were answered to satisfaction  Follow up in 6 months CBC, CMP, vitmain D.     [FreeTextEntry2] : [place] - [phone] Juan Alberto - [phone]

## 2024-10-01 NOTE — BEGINNING OF VISIT
[0] : 2) Feeling down, depressed, or hopeless: Not at all (0) [PHQ-2 Negative] : PHQ-2 Negative [PHQ-9 Negative] : PHQ-9 Negative [Pain Scale: ___] : On a scale of 1-10, today the patient's pain is a(n) [unfilled]. [Never] : Never [With Patient/Caregiver] : with Patient/Caregiver [[id number]] : 0

## 2024-10-01 NOTE — PHYSICAL EXAM
[Ambulatory and capable of all self care but unable to carry out any work activities] : Status 2- Ambulatory and capable of all self care but unable to carry out any work activities. Up and about more than 50% of waking hours [Normal] : affect appropriate [de-identified] : Left greater than right lower extremity edema [de-identified] : Declined. prefers to have her PCP examine her

## 2024-10-01 NOTE — BEGINNING OF VISIT
[0] : 2) Feeling down, depressed, or hopeless: Not at all (0) [PHQ-2 Negative] : PHQ-2 Negative [PHQ-9 Negative] : PHQ-9 Negative [Pain Scale: ___] : On a scale of 1-10, today the patient's pain is a(n) [unfilled]. [Never] : Never [With Patient/Caregiver] : with Patient/Caregiver [[id number]] : 0

## 2024-10-01 NOTE — HISTORY OF PRESENT ILLNESS
[de-identified] : Ms. Lizbeth Cody is 82 year old female with IDC of right breast here for consultation, referred by Dr. Omar Trejo. \par  \par  Patient with PMHx of hyperparathyroidism, osteoporosis, b12 and D deficiency, with screening mammogram noted below. \par  \par  normal mammogram . \par  2022  Mammogram/US\par  A 1.2 cm suspicious mass in the 2:00 right breast, 3 cm from the nipple with a mammographic correlate, for which ultrasound-guided biopsy is recommended.\par  ACR CATEGORY: BIR-4 - BI-RADS 4: SUSPICIOUS\par  \par  22 Breast biopsy:\par  A. Breast, Right, 2:00 o'clock, 3cm fn, Ultrasound guided biopsy:\par  - Invasive ductal carcinoma, histologic grade 2,Nothingham score: 7/9 (T:3,N:2,M:1), measuring 12mm in a single core tissue and involving multiple cores\par  Breast biomarkers (block A2):\par  ER: Positive (%, moderate nuclear intensity staining)\par  AR: Positive (%, moderate nuclear intensity staining)\par  HER-2 by IHC: Equivocal (score 2+)\par  Ki-67: 10-20%\par  E-cadherin: Positive; supports the ductal phenotype\par  P63 and Calponin: Negative\par  \par  Age at Menarche - 10\par  Age at Menopause - 49\par  Age at first pregnancy - 21\par  Total number of pregnancies - 3\par  Breast feeding - yes\par  OC pills - none \par  HRT - yes, couple of years\par  \par  FHx\par  M. cousin with breast cancer in her 30s - \par  \par  Social History\par  Smoked, quit in 1960s.\par  Alcohol - no\par  Illicit drugs - no\par  Work -retired, teacher and  \par  Lives with her  \par  \par  She has been on Prolia for two years, held since 2022 due to dental procedure in 2022. \par  \par  She is s/p right partial mastectomy with SNLB (23) with Dr Harding\par  Pathology\par  IDC, 14 mm, G2\par  Pathological stage IA (oF3pdY2). 2 sentinel nodes negative\par  Lqs3oml negative by FISH\par  \par   [de-identified] : Patient is seen today for follow up Started tamoxifen (3/20/23 -8/2/2023) Discontinued due to DVT/PE and subsequently found to have  protein S deficiency/heterozygous prothromobin gene  10/1/2024: She's doing well and has no complaints today. Next Prolia is in March 2025.   U/S Thyroid W/ Hellerman MD  IMPRESSION:  Interval stability.   Diffusely heterogeneous thyroid gland with right thyroid gland atrophy.   Dominant left thyroid nodule, unchanged.   TI-RAD 3: Mildly suspicious (FNA if > 2.5 cm, Follow if > 1.5 cm)  __________________________________  ACR Thyroid Imaging, Reporting and Data System (TI-RADS): White Paper of the ACR TI-RADS Committee. J Am Antalee Radiol 2017;14:587-595.   TI-RAD 1: Benign (No FNA)  TI-RAD 2: Not suspicious (No FNA)  TI-RAD 3: Mildly suspicious (FNA if > 2.5 cm, Follow if >1.5 cm)  TI-RAD 4: Moderately suspicious (FNA if > 1.5 cm, Follow if > 1 cm)  TI-RAD 5: Highly suspicious (FNA if > 1 cm, Follow if > 0.5 cm)   --- End of Report ---

## 2024-10-01 NOTE — HISTORY OF PRESENT ILLNESS
[de-identified] : Ms. Lizbeth Cody is 82 year old female with IDC of right breast here for consultation, referred by Dr. Omar Trejo. \par  \par  Patient with PMHx of hyperparathyroidism, osteoporosis, b12 and D deficiency, with screening mammogram noted below. \par  \par  normal mammogram . \par  2022  Mammogram/US\par  A 1.2 cm suspicious mass in the 2:00 right breast, 3 cm from the nipple with a mammographic correlate, for which ultrasound-guided biopsy is recommended.\par  ACR CATEGORY: BIR-4 - BI-RADS 4: SUSPICIOUS\par  \par  22 Breast biopsy:\par  A. Breast, Right, 2:00 o'clock, 3cm fn, Ultrasound guided biopsy:\par  - Invasive ductal carcinoma, histologic grade 2,Nothingham score: 7/9 (T:3,N:2,M:1), measuring 12mm in a single core tissue and involving multiple cores\par  Breast biomarkers (block A2):\par  ER: Positive (%, moderate nuclear intensity staining)\par  DC: Positive (%, moderate nuclear intensity staining)\par  HER-2 by IHC: Equivocal (score 2+)\par  Ki-67: 10-20%\par  E-cadherin: Positive; supports the ductal phenotype\par  P63 and Calponin: Negative\par  \par  Age at Menarche - 10\par  Age at Menopause - 49\par  Age at first pregnancy - 21\par  Total number of pregnancies - 3\par  Breast feeding - yes\par  OC pills - none \par  HRT - yes, couple of years\par  \par  FHx\par  M. cousin with breast cancer in her 30s - \par  \par  Social History\par  Smoked, quit in 1960s.\par  Alcohol - no\par  Illicit drugs - no\par  Work -retired, teacher and  \par  Lives with her  \par  \par  She has been on Prolia for two years, held since 2022 due to dental procedure in 2022. \par  \par  She is s/p right partial mastectomy with SNLB (23) with Dr Harding\par  Pathology\par  IDC, 14 mm, G2\par  Pathological stage IA (kB2aeV6). 2 sentinel nodes negative\par  Uzh0xqv negative by FISH\par  \par   [de-identified] : Patient is seen today for follow up Started tamoxifen (3/20/23 -8/2/2023) Discontinued due to DVT/PE and subsequently found to have  protein S deficiency/heterozygous prothromobin gene  10/1/2024: She's doing well and has no complaints today. Next Prolia is in March 2025.   U/S Thyroid W/ Hellerman MD  IMPRESSION:  Interval stability.   Diffusely heterogeneous thyroid gland with right thyroid gland atrophy.   Dominant left thyroid nodule, unchanged.   TI-RAD 3: Mildly suspicious (FNA if > 2.5 cm, Follow if > 1.5 cm)  __________________________________  ACR Thyroid Imaging, Reporting and Data System (TI-RADS): White Paper of the ACR TI-RADS Committee. J Am Natalee Radiol 2017;14:587-595.   TI-RAD 1: Benign (No FNA)  TI-RAD 2: Not suspicious (No FNA)  TI-RAD 3: Mildly suspicious (FNA if > 2.5 cm, Follow if >1.5 cm)  TI-RAD 4: Moderately suspicious (FNA if > 1.5 cm, Follow if > 1 cm)  TI-RAD 5: Highly suspicious (FNA if > 1 cm, Follow if > 0.5 cm)   --- End of Report ---

## 2024-10-04 NOTE — HISTORY OF PRESENT ILLNESS
[FreeTextEntry1] : - Oct 01, 2024    in person    resides at [place]  PCP:     Dr. Catie Mcnally             Gyn: Dr. Caity Reynolds             Urol: Dr. Uriel Alex (stones - one episode)              Oral surgeon: Dr. Nugent - dental implants             H/O:   Dr. Rosemary Juarez  - breast cancer -> Tamox     Stopped post DVT and found protein S defic.  now Xaralto.            .            CC: Osteoporosis (prev. Forteo): Fosamax Jan 2014 2/23/2018  CTX s 383  ionized Ca  5.9 (<5.6) [facility]                               1/31/2019:  no compression fractures.  Low vit D                                  12/18/2018:  BD Lin Spine T -3.0, TH -1.4, FN -2.2, forearm -2.5                               11/8/19 hypercalcemia  Ca 10.5 (8.6 - 10.4) [facility]  CTXs 429  Vit D 30 (no PTH)                                   4/30/20 [facility]:  PTH 94;  CTXs 598                                12/21/2020:  BD Lin  Spine T -3.1                   Thyroid nodule  2.3 cm L, FNAB 6/2017 [facility] - benign- low cellularity                   Kidney stones  10/2015 US Kidney - asymptomatic - saw Dr. Alex                    Hypercalciuria   (5/2020 calcium 302 mg/w hrs at [facility])  Vists for osteoporoisis. Restarted Prolia aft r8/9/viskt  on 8/21/2024. Will aim for next Prolia late March 2025     In April 2024 vit D 32.7 - she takes vit D 1000 iu daily   Calcium 10.7  8/2024 follow up US thyroid:  There is 1.2 x 1.0 x 0.9 cm heterogeneous predominantly isoechoic nodule in the lower pole, not significantly changed.. There are no calcifications. (TIRAD -3).   Isthmus: 1 mm.   Cervical Lymph Nodes: No enlarged or abnormal morphology cervical nodes.   No parathyroid lesions identified.   IMPRESSION:  Interval stability.   Diffusely heterogeneous thyroid gland with right thyroid gland atrophy.   Dominant left thyroid nodule, unchanged.   TI-RAD 3: Mildly suspicious (FNA if > 2.5 cm, Follow if > 1.5 cm)  : : Constitutional:  Alert, well nourished, healthy appearance, no acute distress  Eyes:  No proptosis, no stare Thyroid: normal to palpation  Pulmonary:  No respiratory distress, no accessory muscle use; normal rate and effort Cardiac:  Normal rate Vascular:  Endocrine:  No stigmata of Cushings Syndrome Musculoskeletal:  Normal gait, no involuntary movements Neurology:  No tremors Affect/Mood/Psych:  Oriented x 3; normal affect, normal insight/judgement, normal mood  . Impression:   Doing well.    Thyroid nodule remains under surveillance.    Next Prolia at 7 months  Wed March 12 at 10:00 am Labs before that.      PCP:     Dr. Catie Mcnally             Gyn: Dr. Caity Reynolds             Urol: Dr. Uriel Alex (stones - one episode)              Oral surgeon: Dr. Nugent - dental implants             H/O:   Dr. Rosemary Juarez  - breast cancer -> Tamox     Stopped post DVT and found protein S defic.  now Xaralto.            .            CC: Osteoporosis (prev. Forteo): Fosamax Jan 2014 2/23/2018  CTX s 383  ionized Ca  5.9 (<5.6) [facility]                               1/31/2019:  no compression fractures.  Low vit D                                  12/18/2018:  BD Lin Spine T -3.0, TH -1.4, FN -2.2, forearm -2.5                               11/8/19 hypercalcemia  Ca 10.5 (8.6 - 10.4) [facility]  CTXs 429  Vit D 30 (no PTH)                                   4/30/20 [facility]:  PTH 94;  CTXs 598                                12/21/2020:  BD Lin  Spine T -3.1                   Thyroid nodule  2.3 cm L, FNAB 6/2017 [facility] - benign- low cellularity                   Kidney stones  10/2015 US Kidney - asymptomatic - saw Dr. Alex                    Hypercalciuria   (5/2020 calcium 302 mg/w hrs at [facility])   82 yo - still goes to oral surgeon - had to skip a Prolia. - last 1 1/2 yrs ago   Last saw end of July. 2023 Has occasional elevated PTH (US thyroid, sestamibi neg in 2018) Has history of possible asymptomatic kidney stone.  Lab tests from 11/2023 included  calcium 10.7 **           Vit D usually mid 30's   9/3/2021 PTH 78, calcium 10.2      Aug 09, 2024           PCP:     Dr. Catie Mcnally             Gyn: Dr. Caity Reynolds             Urol: Dr. Uriel Alex (stones - one episode)              Oral surgeon: Dr. Nugent - dental implants             H/O:   Dr. Rosemary Juarez  - breast cancer -> Tamox     Stopped post DVT and found protein S defic.  now Xaralto.            .            CC: Osteoporosis (prev. Forteo): Fosamax Jan 2014 2/23/2018  CTX s 383  ionized Ca  5.9 (<5.6) [facility]                               1/31/2019:  no compression fractures.  Low vit D                                  12/18/2018:  BD Lin Spine T -3.0, TH -1.4, FN -2.2, forearm -2.5                               11/8/19 hypercalcemia  Ca 10.5 (8.6 - 10.4) [facility]  CTXs 429  Vit D 30 (no PTH)                                   4/30/20 [facility]:  PTH 94;  CTXs 598                                12/21/2020:  BD Lin  Spine T -3.1                   Thyroid nodule  2.3 cm L, FNAB 6/2017 [facility] - benign- low cellularity                   Kidney stones  10/2015 US Kidney - asymptomatic - saw Dr. Alex                    Hypercalciuria   (5/2020 calcium 302 mg/w hrs at [facility])   82 yo - still goes to oral surgeon - had to skip a Prolia. - last 1 1/2 yrs ago   Last saw end of July. 2023 Has occasional elevated PTH (US thyroid, sestamibi neg in 2018) Has history of possible asymptomatic kidney stone.  Lab tests from 11/2023 included  calcium 10.7 **           Vit D usually mid 30's   9/3/2021 PTH 78, calcium 10.2    : : Constitutional:  Alert, well nourished, healthy appearance, no acute distress  Eyes:  No proptosis, no stare Thyroid: Pulmonary:  No respiratory distress, no accessory muscle use; normal rate and effort Cardiac:  Normal rate Vascular:  Endocrine:  No stigmata of Cushings Syndrome Musculoskeletal:  Normal gait, no involuntary movements Neurology:  No tremors Affect/Mood/Psych:  Oriented x 3; normal affect, normal insight/judgement, normal mood   Impression:  Good evidence for hyperparathyroidism. 10/2015 there were kidney stones. 5/2018 US showed nodules posterior thyroid. She is ready to restart Prolia Vitamin D i good range.         +Mar 05, 2024    in person  PCP:     Dr. Catie Mcnally             Gyn: Dr. Caity Reynolds             Urol: Dr. Uriel Alex (stones - one episode)              Oral surgeon: Dr. Nugent - dental implants             H/O:   Dr. Rosemary Juarez  - breast cancer -> Tamox     Stopped post DVT and found protein S defic.  now Xaralto.            .            CC: Osteoporosis (prev. Forteo): Fosamax Jan 2014 2/23/2018  CTX s 383  ionized Ca  5.9 (<5.6) [facility]                               1/31/2019:  no compression fractures.  Low vit D                                  12/18/2018:  BD Lin Spine T -3.0, TH -1.4, FN -2.2, forearm -2.5                               11/8/19 hypercalcemia  Ca 10.5 (8.6 - 10.4) [facility]  CTXs 429  Vit D 30 (no PTH)                                   4/30/20 [facility]:  PTH 94;  CTXs 598                                12/21/2020:  BD Lin  Spine T -3.1                   Thyroid nodule  2.3 cm L, FNAB 6/2017 [facility] - benign- low cellularity                   Kidney stones  10/2015 US Kidney - asymptomatic - saw Dr. Alex                    Hypercalciuria   (5/2020 calcium 302 mg/w hrs at [facility])   82 yo - still goes to oral surgeon - had to skip a Prolia. - last 1 1/2 yrs ago   Last saw end of July. 2023 Has occasional elevated PTH (US thyroid, sestamibi neg in 2018) Has history of possible asymptomatic kidney stone.  Lab tests from 11/2023 included  calcium 10.7 **           Vit D usually mid 30's   9/3/2021 PTH 78, calcium 10.2   : : Constitutional:  Alert, well nourished, healthy appearance, no acute distress  Eyes:  No proptosis, no stare Thyroid: Pulmonary:  No respiratory distress, no accessory muscle use; normal rate and effort Cardiac:  Normal rate Vascular:  Endocrine:  No stigmata of Cushings Syndrome Musculoskeletal:  Normal gait, no involuntary movements Neurology:  No tremors Affect/Mood/Psych:  Oriented x 3; normal affect, normal insight/judgement, normal mood  . Imp:  Dental and other issues determined last Prolia about 1.5  yrs ago.  Has hypercalciuria, likely hyperparathyroidism, worsening osteoporosis of the spine.  Plan:  X-ray spine Updated labs. Consider for Evenity [id number] by Prolia or Reclast.        Oct 03, 2023      nip                 PCP:     Dr. Catie Mcnally             Gyn: Dr. Caity Reynolds             Urol: Dr. Uriel Alex (stones - one episode)              Oral surgeon: Dr. Nugent - dental implants             H/O:   Dr. Rosemary Juarez  - breast cancer -> Tamox            .            CC: Osteoporosis (prev. Forteo): Fosamax Jan 2014 2/23/2018  CTX s 383  ionized Ca  5.9 (<5.6) [facility]                               1/31/2019:  no compression fractures.  Low vit D                                  12/18/2018:  BD Lin Spine T -3.0, TH -1.4, FN -2.2, forearm -2.5                               11/8/19 hypercalcemia  Ca 10.5 (8.6 - 10.4) [facility]  CTXs 429  Vit D 30 (no PTH)                                   4/30/20 [facility]:  PTH 94;  CTXs 598                                12/21/2020:  BD Lin  Spine T -3.1                   Thyroid nodule  2.3 cm L, FNAB 6/2017 [facility] - benign- low cellularity                   Kidney stones  10/2015 US Kidney - asymptomatic - saw Dr. Alex                    Hypercalciuria   (5/2020 calcium 302 mg/w hrs at [facility])   81 yo -  visits for osteoporosis of spine (no compressions 2020 X-ray); hyperalciuria; likely hyperparathyroidism (negative sestamibi scah)      still goes to oral surgeon - had to skip a Prolia. Last saw end of July.  Has occasional elevated PTH (US thyroid, sestamibi neg in 2018) Has history of possible asymptomatic kidney stone.    Was on Tamoxifen, now Xaralto.      Had oral surgery a year ago - last surgery with Dr. Nugent in July    will see him again in November.     BD in November.   Taking vit D   was 33 in March 2023.      Imp:  No fractures;  no falls.       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   Will continue same Rx.     -    Mar 29, 2023    in person  PCP:     Dr. Catie Mcnally             Gyn: Dr. Caity Reynolds             Urol: Dr. Uriel Alex (stones - one episode)              Oral surgeon: Dr. Nugent - dental implants             H/O:   Dr. Rosemary Juarez  - breast cancer -> Tamox            .            CC: Osteoporosis (prev. Forteo): Fosamax Jan 2014 2/23/2018  CTX s 383  ionized Ca  5.9 (<5.6) [facility]                               1/31/2019:  no compression fractures.  Low vit D                                  12/18/2018:  BD Lin Spine T -3.0, TH -1.4, FN -2.2, forearm -2.5                               11/8/19 hypercalcemia  Ca 10.5 (8.6 - 10.4) [facility]  CTXs 429  Vit D 30 (no PTH)                                   4/30/20 [facility]:  PTH 94;  CTXs 598                                12/21/2020:  BD Lin  Spine T -3.1                   Thyroid nodule  2.3 cm L, FNAB 6/2017 [facility] - benign- low cellularity                   Kidney stones  10/2015 US Kidney - asymptomatic - saw Dr. Alex                    Hypercalciuria   (5/2020 calcium 302 mg/w hrs at [facility])   81 yo - still goes to oral surgeon - had to skip a Prolia. Last saw end of July.  Has occasional elevated PTH (US thyroid, sestamibi neg in 2018) Has history of possible asymptomatic kidney stone.  : : Constitutional:  Alert, well nourished, healthy appearance, no acute distress  Eyes:  No proptosis, no stare Thyroid: Pulmonary:  No respiratory distress, no accessory muscle use; normal rate and effort Cardiac:  Normal rate Vascular:  Endocrine:  No stigmata of Cushing's Syndrome Musculoskeletal:  Normal gait, no involuntary movements Neurology:  No tremors Affect/Mood/Psych:  Oriented x 3; normal affect, normal insight/judgement, normal mood  .  Imp:  Can restart the Prolia for now and will consider for Reclast eventually.        Jun 08, 2022      iPhone  PCP:     Dr. Catie Mcnally             Gyn: Dr. Caity Reynolds             Urol: Dr. Uriel Alex (stones - one episode)              Oral surgeon: Dr. Nugent - dental implants            .            CC: Osteoporosis (prev. Forteo): Fosamax Jan 2014 2/23/2018  CTX s 383  ionized Ca  5.9 (<5.6) [facility]                               1/31/2019:  no compression fractures.  Low vit D                                  12/18/2018:  BD Lin Spine T -3.0, TH -1.4, FN -2.2, forearm -2.5                               11/8/19 hypercalcemia  Ca 10.5 (8.6 - 10.4) [facility]  CTXs 429  Vit D 30 (no PTH)                                   4/30/20 [facility]:  PTH 94;  CTXs 598                                12/21/2020:  BD Lin  Spine T -3.1                   Thyroid nodule  2.3 cm L, FNAB 6/2017 [facility] - benign- low cellularity                   Kidney stones  10/2015 US Kidney - asymptomatic - saw Dr. Alex                    Hypercalciuria   (5/2020 calcium 302 mg/w hrs at [facility])   Last Prolia 9/27/2021 and so next likely 3/27/2022 - Rx sent and she will also call Getting calcium in diet and taking 1000 iu vit D daily    Moste recent Prolia 3/28 and next is 9/28/22  After htat will be 3/2023 so next visit around January 2023 Feb 02, 2022       iPhone (she resides at [facility])  PCP:     Dr. Catie Mcnally             Gyn: Dr. Caity Reynolds             Urol: Dr. Uriel Alex (stones - one episode)              Oral surgeon: Dr. Nugent - dental implants            .            CC: Osteoporosis (prev. Forteo): Fosamax Jan 2014 2/23/2018  CTX s 383  ionized Ca  5.9 (<5.6) [facility]                               1/31/2019:  no compression fractures.  Low vit D                                  12/18/2018:  BD Lin Spine T -3.0, TH -1.4, FN -2.2, forearm -2.5                               11/8/19 hypercalcemia  Ca 10.5 (8.6 - 10.4) [facility]  CTXs 429  Vit D 30 (no PTH)                                   4/30/20 [facility]:  PTH 94;  CTXs 598                                12/21/2020:  BD Lin  Spine T -3.1                   Thyroid nodule  2.3 cm L, FNAB 6/2017 [facility] - benign- low cellularity                   Kidney stones  10/2015  Kidney - asymptomatic - saw Dr. Alex                    Hypercalciuria   (5/2020 calcium 302 mg/w hrs at [facility])   Last Prolia 9/27/2021 and so next likely 3/27/2022 - Rx sent and she will also call Getting calcium in diet and taking 1000 iu vit D daily    Imp:  Doing well, no falls. Plan:  Next Prolia ~ 3/27/2022 followed by ~ 9/27/2022 and ROV here in 6/2022 and then more labs.    Sep 03, 2021        iPhone    at [facility]  PCP:     Dr. Catie Mcnally             Gyn: Dr. Caity Reynolds             Urol: Dr. Uriel Alex (stones - one episode)              Oral surgeon: Dr. Nugent - dental implants            .            CC: Osteoporosis (prev. Forteo): Fosamax Jan 2014 2/23/2018  CTX s 383  ionized Ca  5.9 (<5.6) [facility]                               1/31/2019:  no compression fractures.  Low vit D                                  12/18/2018:  BD Lin Spine T -3.0, TH -1.4, FN -2.2, forearm -2.5                               11/8/19 hypercalcemia  Ca 10.5 (8.6 - 10.4) [facility]  CTXs 429  Vit D 30 (no PTH)                                   4/30/20 [facility]:  PTH 94;  CTXs 598                                12/21/2020:  BD Lin  Spine T -3.1                   Thyroid nodule  2.3 cm L, FNAB 6/2017 [facility] - benign- low cellularity                   Kidney stones  10/2015  Kidney - asymptomatic - saw Dr. Alex                    Hypercalciuria   (5/2020 calcium 302 mg/w hrs at [facility])  Visits for osteoporosis (also thyroid nodule, hypercalciuria, hyperparathyroid - mostly normocalcemic, neg sestamibi; Hx low vitamin D) Recently received second Pfizer Covid vaccine.  6/3/2021 labs at [facility] included calcium 10.7 (< 10.5) PTH 43  (had been 71 in October 2020 when calcium was 9.7)  Treated with Forteo followed by alendronate.   After holiday from alendronate, started on Prolia #1 on Feb 19, 2021 She is on vit D 1000 iu daily.   Prolia #2 could have been Aug 19.    Imp: Osteoporosis of spine.    Next BD late Dec 2022   Prolia #2 can be now after updated vitamin D level.   ROV within 5 months     Feb 11, 2021   phone i     [phone]   PCP:     Dr. Catie Mcnally             Gyn: Dr. Caity Reynolds             Urol: Dr. Uriel Alex (stones - one episode)              Oral surgeon: Dr. Nugent - dental implants            .            CC: Osteoporosis (prev. Forteo): Fosamax Jan 2014 2/23/2018  CTX s 383  ionized Ca  5.9 (<5.6) [facility]                               1/31/2019:  no compression fractures.  Low vit D                                  12/18/2018:  BD Lin Spine T -3.0, TH -1.4, FN -2.2, forearm -2.5                               11/8/19 hypercalcemia  Ca 10.5 (8.6 - 10.4) [facility]  CTXs 429  Vit D 30 (no PTH)                   Thyroid nodule  2.3 cm L, FNAB 6/2017 [facility] - benign- low cellularity                   Kidney stones  10/2015  Kidney - asymptomatic - saw Dr. Alex                    Hypercalciuria  Visits for osteoporosis (also thyroid nodule, hyperparathroid) Recenty received second Pfizer Covid vaccine.  Has not required any oral surgery in past few years. She used to have low vitamin D, now takes 1000 iu daily Spine T -3.1, Z -0.6.   T down from previus T of -3.0  Impression:  Not good candidate for parathyroid surgery. Plan:  Consider for Prolia

## 2024-10-04 NOTE — HISTORY OF PRESENT ILLNESS
[FreeTextEntry1] : - Oct 01, 2024    in person    resides at [place]  PCP:     Dr. Catie Mcanlly             Gyn: Dr. Caity Reynolds             Urol: Dr. Uriel Alex (stones - one episode)              Oral surgeon: Dr. Nugent - dental implants             H/O:   Dr. Rosemary Juarez  - breast cancer -> Tamox     Stopped post DVT and found protein S defic.  now Xaralto.            .            CC: Osteoporosis (prev. Forteo): Fosamax Jan 2014 2/23/2018  CTX s 383  ionized Ca  5.9 (<5.6) [facility]                               1/31/2019:  no compression fractures.  Low vit D                                  12/18/2018:  BD Lin Spine T -3.0, TH -1.4, FN -2.2, forearm -2.5                               11/8/19 hypercalcemia  Ca 10.5 (8.6 - 10.4) [facility]  CTXs 429  Vit D 30 (no PTH)                                   4/30/20 [facility]:  PTH 94;  CTXs 598                                12/21/2020:  BD Lin  Spine T -3.1                   Thyroid nodule  2.3 cm L, FNAB 6/2017 [facility] - benign- low cellularity                   Kidney stones  10/2015 US Kidney - asymptomatic - saw Dr. Alex                    Hypercalciuria   (5/2020 calcium 302 mg/w hrs at [facility])  Vists for osteoporoisis. Restarted Prolia aft r8/9/viskt  on 8/21/2024. Will aim for next Prolia late March 2025     In April 2024 vit D 32.7 - she takes vit D 1000 iu daily   Calcium 10.7  8/2024 follow up US thyroid:  There is 1.2 x 1.0 x 0.9 cm heterogeneous predominantly isoechoic nodule in the lower pole, not significantly changed.. There are no calcifications. (TIRAD -3).   Isthmus: 1 mm.   Cervical Lymph Nodes: No enlarged or abnormal morphology cervical nodes.   No parathyroid lesions identified.   IMPRESSION:  Interval stability.   Diffusely heterogeneous thyroid gland with right thyroid gland atrophy.   Dominant left thyroid nodule, unchanged.   TI-RAD 3: Mildly suspicious (FNA if > 2.5 cm, Follow if > 1.5 cm)  : : Constitutional:  Alert, well nourished, healthy appearance, no acute distress  Eyes:  No proptosis, no stare Thyroid: normal to palpation  Pulmonary:  No respiratory distress, no accessory muscle use; normal rate and effort Cardiac:  Normal rate Vascular:  Endocrine:  No stigmata of Cushings Syndrome Musculoskeletal:  Normal gait, no involuntary movements Neurology:  No tremors Affect/Mood/Psych:  Oriented x 3; normal affect, normal insight/judgement, normal mood  . Impression:   Doing well.    Thyroid nodule remains under surveillance.    Next Prolia at 7 months  Wed March 12 at 10:00 am Labs before that.      PCP:     Dr. Catie Mcnally             Gyn: Dr. Caity Reynolds             Urol: Dr. Uriel Alex (stones - one episode)              Oral surgeon: Dr. Nugent - dental implants             H/O:   Dr. Rosemary Juarez  - breast cancer -> Tamox     Stopped post DVT and found protein S defic.  now Xaralto.            .            CC: Osteoporosis (prev. Forteo): Fosamax Jan 2014 2/23/2018  CTX s 383  ionized Ca  5.9 (<5.6) [facility]                               1/31/2019:  no compression fractures.  Low vit D                                  12/18/2018:  BD Lin Spine T -3.0, TH -1.4, FN -2.2, forearm -2.5                               11/8/19 hypercalcemia  Ca 10.5 (8.6 - 10.4) [facility]  CTXs 429  Vit D 30 (no PTH)                                   4/30/20 [facility]:  PTH 94;  CTXs 598                                12/21/2020:  BD Lin  Spine T -3.1                   Thyroid nodule  2.3 cm L, FNAB 6/2017 [facility] - benign- low cellularity                   Kidney stones  10/2015 US Kidney - asymptomatic - saw Dr. Alex                    Hypercalciuria   (5/2020 calcium 302 mg/w hrs at [facility])   84 yo - still goes to oral surgeon - had to skip a Prolia. - last 1 1/2 yrs ago   Last saw end of July. 2023 Has occasional elevated PTH (US thyroid, sestamibi neg in 2018) Has history of possible asymptomatic kidney stone.  Lab tests from 11/2023 included  calcium 10.7 **           Vit D usually mid 30's   9/3/2021 PTH 78, calcium 10.2      Aug 09, 2024           PCP:     Dr. Catie Mcnally             Gyn: Dr. Caity Reynolds             Urol: Dr. Uriel Alex (stones - one episode)              Oral surgeon: Dr. Nugent - dental implants             H/O:   Dr. Rosemary Juarez  - breast cancer -> Tamox     Stopped post DVT and found protein S defic.  now Xaralto.            .            CC: Osteoporosis (prev. Forteo): Fosamax Jan 2014 2/23/2018  CTX s 383  ionized Ca  5.9 (<5.6) [facility]                               1/31/2019:  no compression fractures.  Low vit D                                  12/18/2018:  BD Lin Spine T -3.0, TH -1.4, FN -2.2, forearm -2.5                               11/8/19 hypercalcemia  Ca 10.5 (8.6 - 10.4) [facility]  CTXs 429  Vit D 30 (no PTH)                                   4/30/20 [facility]:  PTH 94;  CTXs 598                                12/21/2020:  BD Lin  Spine T -3.1                   Thyroid nodule  2.3 cm L, FNAB 6/2017 [facility] - benign- low cellularity                   Kidney stones  10/2015 US Kidney - asymptomatic - saw Dr. Alex                    Hypercalciuria   (5/2020 calcium 302 mg/w hrs at [facility])   84 yo - still goes to oral surgeon - had to skip a Prolia. - last 1 1/2 yrs ago   Last saw end of July. 2023 Has occasional elevated PTH (US thyroid, sestamibi neg in 2018) Has history of possible asymptomatic kidney stone.  Lab tests from 11/2023 included  calcium 10.7 **           Vit D usually mid 30's   9/3/2021 PTH 78, calcium 10.2    : : Constitutional:  Alert, well nourished, healthy appearance, no acute distress  Eyes:  No proptosis, no stare Thyroid: Pulmonary:  No respiratory distress, no accessory muscle use; normal rate and effort Cardiac:  Normal rate Vascular:  Endocrine:  No stigmata of Cushings Syndrome Musculoskeletal:  Normal gait, no involuntary movements Neurology:  No tremors Affect/Mood/Psych:  Oriented x 3; normal affect, normal insight/judgement, normal mood   Impression:  Good evidence for hyperparathyroidism. 10/2015 there were kidney stones. 5/2018 US showed nodules posterior thyroid. She is ready to restart Prolia Vitamin D i good range.         +Mar 05, 2024    in person  PCP:     Dr. Catie Mcnally             Gyn: Dr. Caity Reynolds             Urol: Dr. Uriel Alex (stones - one episode)              Oral surgeon: Dr. Nugent - dental implants             H/O:   Dr. Rosemary Juarez  - breast cancer -> Tamox     Stopped post DVT and found protein S defic.  now Xaralto.            .            CC: Osteoporosis (prev. Forteo): Fosamax Jan 2014 2/23/2018  CTX s 383  ionized Ca  5.9 (<5.6) [facility]                               1/31/2019:  no compression fractures.  Low vit D                                  12/18/2018:  BD Lin Spine T -3.0, TH -1.4, FN -2.2, forearm -2.5                               11/8/19 hypercalcemia  Ca 10.5 (8.6 - 10.4) [facility]  CTXs 429  Vit D 30 (no PTH)                                   4/30/20 [facility]:  PTH 94;  CTXs 598                                12/21/2020:  BD Lin  Spine T -3.1                   Thyroid nodule  2.3 cm L, FNAB 6/2017 [facility] - benign- low cellularity                   Kidney stones  10/2015 US Kidney - asymptomatic - saw Dr. Alex                    Hypercalciuria   (5/2020 calcium 302 mg/w hrs at [facility])   84 yo - still goes to oral surgeon - had to skip a Prolia. - last 1 1/2 yrs ago   Last saw end of July. 2023 Has occasional elevated PTH (US thyroid, sestamibi neg in 2018) Has history of possible asymptomatic kidney stone.  Lab tests from 11/2023 included  calcium 10.7 **           Vit D usually mid 30's   9/3/2021 PTH 78, calcium 10.2   : : Constitutional:  Alert, well nourished, healthy appearance, no acute distress  Eyes:  No proptosis, no stare Thyroid: Pulmonary:  No respiratory distress, no accessory muscle use; normal rate and effort Cardiac:  Normal rate Vascular:  Endocrine:  No stigmata of Cushings Syndrome Musculoskeletal:  Normal gait, no involuntary movements Neurology:  No tremors Affect/Mood/Psych:  Oriented x 3; normal affect, normal insight/judgement, normal mood  . Imp:  Dental and other issues determined last Prolia about 1.5  yrs ago.  Has hypercalciuria, likely hyperparathyroidism, worsening osteoporosis of the spine.  Plan:  X-ray spine Updated labs. Consider for Evenity [id number] by Prolia or Reclast.        Oct 03, 2023      nip                 PCP:     Dr. Catie Mcnally             Gyn: Dr. Caity Reynolds             Urol: Dr. Uriel Alex (stones - one episode)              Oral surgeon: Dr. Nugent - dental implants             H/O:   Dr. Rosemary Juarez  - breast cancer -> Tamox            .            CC: Osteoporosis (prev. Forteo): Fosamax Jan 2014 2/23/2018  CTX s 383  ionized Ca  5.9 (<5.6) [facility]                               1/31/2019:  no compression fractures.  Low vit D                                  12/18/2018:  BD Lin Spine T -3.0, TH -1.4, FN -2.2, forearm -2.5                               11/8/19 hypercalcemia  Ca 10.5 (8.6 - 10.4) [facility]  CTXs 429  Vit D 30 (no PTH)                                   4/30/20 [facility]:  PTH 94;  CTXs 598                                12/21/2020:  BD Lin  Spine T -3.1                   Thyroid nodule  2.3 cm L, FNAB 6/2017 [facility] - benign- low cellularity                   Kidney stones  10/2015 US Kidney - asymptomatic - saw Dr. Alex                    Hypercalciuria   (5/2020 calcium 302 mg/w hrs at [facility])   81 yo -  visits for osteoporosis of spine (no compressions 2020 X-ray); hyperalciuria; likely hyperparathyroidism (negative sestamibi scah)      still goes to oral surgeon - had to skip a Prolia. Last saw end of July.  Has occasional elevated PTH (US thyroid, sestamibi neg in 2018) Has history of possible asymptomatic kidney stone.    Was on Tamoxifen, now Xaralto.      Had oral surgery a year ago - last surgery with Dr. Nugent in July    will see him again in November.     BD in November.   Taking vit D   was 33 in March 2023.      Imp:  No fractures;  no falls.       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   Will continue same Rx.     -    Mar 29, 2023    in person  PCP:     Dr. Catie Mcnally             Gyn: Dr. Caity Reynolds             Urol: Dr. Uriel Alex (stones - one episode)              Oral surgeon: Dr. Nugent - dental implants             H/O:   Dr. Rosemary Juarez  - breast cancer -> Tamox            .            CC: Osteoporosis (prev. Forteo): Fosamax Jan 2014 2/23/2018  CTX s 383  ionized Ca  5.9 (<5.6) [facility]                               1/31/2019:  no compression fractures.  Low vit D                                  12/18/2018:  BD Lin Spine T -3.0, TH -1.4, FN -2.2, forearm -2.5                               11/8/19 hypercalcemia  Ca 10.5 (8.6 - 10.4) [facility]  CTXs 429  Vit D 30 (no PTH)                                   4/30/20 [facility]:  PTH 94;  CTXs 598                                12/21/2020:  BD Lin  Spine T -3.1                   Thyroid nodule  2.3 cm L, FNAB 6/2017 [facility] - benign- low cellularity                   Kidney stones  10/2015 US Kidney - asymptomatic - saw Dr. Alex                    Hypercalciuria   (5/2020 calcium 302 mg/w hrs at [facility])   81 yo - still goes to oral surgeon - had to skip a Prolia. Last saw end of July.  Has occasional elevated PTH (US thyroid, sestamibi neg in 2018) Has history of possible asymptomatic kidney stone.  : : Constitutional:  Alert, well nourished, healthy appearance, no acute distress  Eyes:  No proptosis, no stare Thyroid: Pulmonary:  No respiratory distress, no accessory muscle use; normal rate and effort Cardiac:  Normal rate Vascular:  Endocrine:  No stigmata of Cushing's Syndrome Musculoskeletal:  Normal gait, no involuntary movements Neurology:  No tremors Affect/Mood/Psych:  Oriented x 3; normal affect, normal insight/judgement, normal mood  .  Imp:  Can restart the Prolia for now and will consider for Reclast eventually.        Jun 08, 2022      [employer]  PCP:     Dr. Catie Mcnally             Gyn: Dr. Caity Reynolds             Urol: Dr. Uriel Alex (stones - one episode)              Oral surgeon: Dr. Nugent - dental implants            .            CC: Osteoporosis (prev. Forteo): Fosamax Jan 2014 2/23/2018  CTX s 383  ionized Ca  5.9 (<5.6) [facility]                               1/31/2019:  no compression fractures.  Low vit D                                  12/18/2018:  BD Lin Spine T -3.0, TH -1.4, FN -2.2, forearm -2.5                               11/8/19 hypercalcemia  Ca 10.5 (8.6 - 10.4) [facility]  CTXs 429  Vit D 30 (no PTH)                                   4/30/20 [facility]:  PTH 94;  CTXs 598                                12/21/2020:  BD Lin  Spine T -3.1                   Thyroid nodule  2.3 cm L, FNAB 6/2017 [facility] - benign- low cellularity                   Kidney stones  10/2015 US Kidney - asymptomatic - saw Dr. Alex                    Hypercalciuria   (5/2020 calcium 302 mg/w hrs at [facility])   Last Prolia 9/27/2021 and so next likely 3/27/2022 - Rx sent and she will also call Getting calcium in diet and taking 1000 iu vit D daily    Moste recent Prolia 3/28 and next is 9/28/22  After htat will be 3/2023 so next visit around January 2023 Feb 02, 2022       iPhone (she resides at [facility])  PCP:     Dr. Catie Mcnally             Gyn: Dr. Caity Reynolds             Urol: Dr. Uriel Alex (stones - one episode)              Oral surgeon: Dr. Nugent - dental implants            .            CC: Osteoporosis (prev. Forteo): Fosamax Jan 2014 2/23/2018  CTX s 383  ionized Ca  5.9 (<5.6) [facility]                               1/31/2019:  no compression fractures.  Low vit D                                  12/18/2018:  BD Lin Spine T -3.0, TH -1.4, FN -2.2, forearm -2.5                               11/8/19 hypercalcemia  Ca 10.5 (8.6 - 10.4) [facility]  CTXs 429  Vit D 30 (no PTH)                                   4/30/20 [facility]:  PTH 94;  CTXs 598                                12/21/2020:  BD Lin  Spine T -3.1                   Thyroid nodule  2.3 cm L, FNAB 6/2017 [facility] - benign- low cellularity                   Kidney stones  10/2015  Kidney - asymptomatic - saw Dr. Alex                    Hypercalciuria   (5/2020 calcium 302 mg/w hrs at [facility])   Last Prolia 9/27/2021 and so next likely 3/27/2022 - Rx sent and she will also call Getting calcium in diet and taking 1000 iu vit D daily    Imp:  Doing well, no falls. Plan:  Next Prolia ~ 3/27/2022 followed by ~ 9/27/2022 and ROV here in 6/2022 and then more labs.    Sep 03, 2021        iPhone    at [facility]  PCP:     Dr. Catie Mcnally             Gyn: Dr. Caity Reynolds             Urol: Dr. Uriel Alex (stones - one episode)              Oral surgeon: Dr. Nugent - dental implants            .            CC: Osteoporosis (prev. Forteo): Fosamax Jan 2014 2/23/2018  CTX s 383  ionized Ca  5.9 (<5.6) [facility]                               1/31/2019:  no compression fractures.  Low vit D                                  12/18/2018:  BD Lin Spine T -3.0, TH -1.4, FN -2.2, forearm -2.5                               11/8/19 hypercalcemia  Ca 10.5 (8.6 - 10.4) [facility]  CTXs 429  Vit D 30 (no PTH)                                   4/30/20 [facility]:  PTH 94;  CTXs 598                                12/21/2020:  BD Lin  Spine T -3.1                   Thyroid nodule  2.3 cm L, FNAB 6/2017 [facility] - benign- low cellularity                   Kidney stones  10/2015  Kidney - asymptomatic - saw Dr. Alex                    Hypercalciuria   (5/2020 calcium 302 mg/w hrs at [facility])  Visits for osteoporosis (also thyroid nodule, hypercalciuria, hyperparathyroid - mostly normocalcemic, neg sestamibi; Hx low vitamin D) Recently received second Pfizer Covid vaccine.  6/3/2021 labs at [facility] included calcium 10.7 (< 10.5) PTH 43  (had been 71 in October 2020 when calcium was 9.7)  Treated with Forteo followed by alendronate.   After holiday from alendronate, started on Prolia #1 on Feb 19, 2021 She is on vit D 1000 iu daily.   Prolia #2 could have been Aug 19.    Imp: Osteoporosis of spine.    Next BD late Dec 2022   Prolia #2 can be now after updated vitamin D level.   ROV within 5 months     Feb 11, 2021   phone i     [phone]   PCP:     Dr. Catie Mcnally             Gyn: Dr. Caity Reynolds             Urol: Dr. Uriel Alex (stones - one episode)              Oral surgeon: Dr. Nugent - dental implants            .            CC: Osteoporosis (prev. Forteo): Fosamax Jan 2014 2/23/2018  CTX s 383  ionized Ca  5.9 (<5.6) [facility]                               1/31/2019:  no compression fractures.  Low vit D                                  12/18/2018:  BD Lin Spine T -3.0, TH -1.4, FN -2.2, forearm -2.5                               11/8/19 hypercalcemia  Ca 10.5 (8.6 - 10.4) [facility]  CTXs 429  Vit D 30 (no PTH)                   Thyroid nodule  2.3 cm L, FNAB 6/2017 [facility] - benign- low cellularity                   Kidney stones  10/2015  Kidney - asymptomatic - saw Dr. Alex                    Hypercalciuria  Visits for osteoporosis (also thyroid nodule, hyperparathroid) Recenty received second Pfizer Covid vaccine.  Has not required any oral surgery in past few years. She used to have low vitamin D, now takes 1000 iu daily Spine T -3.1, Z -0.6.   T down from previus T of -3.0  Impression:  Not good candidate for parathyroid surgery. Plan:  Consider for Prolia

## 2024-11-13 ENCOUNTER — RX RENEWAL (OUTPATIENT)
Age: 84
End: 2024-11-13

## 2024-11-18 ENCOUNTER — RESULT REVIEW (OUTPATIENT)
Age: 84
End: 2024-11-18

## 2024-11-27 ENCOUNTER — APPOINTMENT (OUTPATIENT)
Dept: INTERNAL MEDICINE | Facility: CLINIC | Age: 84
End: 2024-11-27

## 2025-03-04 ENCOUNTER — RESULT REVIEW (OUTPATIENT)
Age: 85
End: 2025-03-04

## 2025-03-04 ENCOUNTER — APPOINTMENT (OUTPATIENT)
Dept: HEMATOLOGY ONCOLOGY | Facility: CLINIC | Age: 85
End: 2025-03-04
Payer: MEDICARE

## 2025-03-04 VITALS
TEMPERATURE: 96.9 F | BODY MASS INDEX: 28.52 KG/M2 | SYSTOLIC BLOOD PRESSURE: 120 MMHG | DIASTOLIC BLOOD PRESSURE: 60 MMHG | HEART RATE: 68 BPM | HEIGHT: 61 IN | OXYGEN SATURATION: 97 % | RESPIRATION RATE: 16 BRPM | WEIGHT: 151.06 LBS

## 2025-03-04 DIAGNOSIS — I82.409 ACUTE EMBOLISM AND THROMBOSIS OF UNSPECIFIED DEEP VEINS OF UNSPECIFIED LOWER EXTREMITY: ICD-10-CM

## 2025-03-04 DIAGNOSIS — D68.59 OTHER PRIMARY THROMBOPHILIA: ICD-10-CM

## 2025-03-04 DIAGNOSIS — D68.52 PROTHROMBIN GENE MUTATION: ICD-10-CM

## 2025-03-04 DIAGNOSIS — C50.911 MALIGNANT NEOPLASM OF UNSPECIFIED SITE OF RIGHT FEMALE BREAST: ICD-10-CM

## 2025-03-04 DIAGNOSIS — M81.0 AGE-RELATED OSTEOPOROSIS W/OUT CURRENT PATHOLOGICAL FRACTURE: ICD-10-CM

## 2025-03-04 PROCEDURE — 99215 OFFICE O/P EST HI 40 MIN: CPT

## 2025-03-04 PROCEDURE — G2211 COMPLEX E/M VISIT ADD ON: CPT

## 2025-03-04 PROCEDURE — 36415 COLL VENOUS BLD VENIPUNCTURE: CPT

## 2025-03-07 ENCOUNTER — NON-APPOINTMENT (OUTPATIENT)
Age: 85
End: 2025-03-07

## 2025-03-07 ENCOUNTER — APPOINTMENT (OUTPATIENT)
Dept: ENDOCRINOLOGY | Facility: CLINIC | Age: 85
End: 2025-03-07
Payer: MEDICARE

## 2025-03-07 VITALS
HEART RATE: 72 BPM | SYSTOLIC BLOOD PRESSURE: 120 MMHG | DIASTOLIC BLOOD PRESSURE: 76 MMHG | BODY MASS INDEX: 28.51 KG/M2 | HEIGHT: 61 IN | WEIGHT: 151 LBS | OXYGEN SATURATION: 98 %

## 2025-03-07 DIAGNOSIS — E21.0 PRIMARY HYPERPARATHYROIDISM: ICD-10-CM

## 2025-03-07 DIAGNOSIS — M81.0 AGE-RELATED OSTEOPOROSIS W/OUT CURRENT PATHOLOGICAL FRACTURE: ICD-10-CM

## 2025-03-07 DIAGNOSIS — E04.1 NONTOXIC SINGLE THYROID NODULE: ICD-10-CM

## 2025-03-07 DIAGNOSIS — R79.89 OTHER SPECIFIED ABNORMAL FINDINGS OF BLOOD CHEMISTRY: ICD-10-CM

## 2025-03-07 PROCEDURE — G2211 COMPLEX E/M VISIT ADD ON: CPT

## 2025-03-07 PROCEDURE — 99215 OFFICE O/P EST HI 40 MIN: CPT

## 2025-04-01 ENCOUNTER — NON-APPOINTMENT (OUTPATIENT)
Age: 85
End: 2025-04-01

## 2025-07-16 ENCOUNTER — APPOINTMENT (OUTPATIENT)
Dept: GASTROENTEROLOGY | Facility: CLINIC | Age: 85
End: 2025-07-16
Payer: MEDICARE

## 2025-07-16 VITALS
HEART RATE: 68 BPM | WEIGHT: 151 LBS | OXYGEN SATURATION: 99 % | HEIGHT: 61 IN | SYSTOLIC BLOOD PRESSURE: 144 MMHG | BODY MASS INDEX: 28.51 KG/M2 | DIASTOLIC BLOOD PRESSURE: 75 MMHG

## 2025-07-16 PROBLEM — K59.09 CHRONIC CONSTIPATION: Status: ACTIVE | Noted: 2025-07-16

## 2025-07-16 PROCEDURE — 99203 OFFICE O/P NEW LOW 30 MIN: CPT

## 2025-07-16 RX ORDER — ACETAMINOPHEN 325 MG/1
325 TABLET ORAL
Refills: 0 | Status: ACTIVE | COMMUNITY

## 2025-08-29 ENCOUNTER — NON-APPOINTMENT (OUTPATIENT)
Age: 85
End: 2025-08-29

## 2025-09-03 ENCOUNTER — APPOINTMENT (OUTPATIENT)
Dept: ORTHOPEDIC SURGERY | Facility: CLINIC | Age: 85
End: 2025-09-03
Payer: MEDICARE

## 2025-09-03 VITALS
BODY MASS INDEX: 28.34 KG/M2 | RESPIRATION RATE: 16 BRPM | HEART RATE: 55 BPM | DIASTOLIC BLOOD PRESSURE: 71 MMHG | OXYGEN SATURATION: 97 % | SYSTOLIC BLOOD PRESSURE: 118 MMHG | WEIGHT: 150 LBS

## 2025-09-03 DIAGNOSIS — S92.514A NONDISPLACED FRACTURE OF PROXIMAL PHALANX OF RIGHT LESSER TOE(S), INITIAL ENCOUNTER FOR CLOSED FRACTURE: ICD-10-CM

## 2025-09-03 PROCEDURE — 28510 TREATMENT OF TOE FRACTURE: CPT | Mod: T9

## 2025-09-03 PROCEDURE — 99203 OFFICE O/P NEW LOW 30 MIN: CPT | Mod: 57

## 2025-09-03 PROCEDURE — G2211 COMPLEX E/M VISIT ADD ON: CPT

## 2025-09-09 ENCOUNTER — RESULT REVIEW (OUTPATIENT)
Age: 85
End: 2025-09-09

## 2025-09-09 ENCOUNTER — APPOINTMENT (OUTPATIENT)
Dept: HEMATOLOGY ONCOLOGY | Facility: CLINIC | Age: 85
End: 2025-09-09
Payer: MEDICARE

## 2025-09-09 VITALS
HEIGHT: 61 IN | OXYGEN SATURATION: 96 % | RESPIRATION RATE: 16 BRPM | SYSTOLIC BLOOD PRESSURE: 112 MMHG | TEMPERATURE: 97.1 F | HEART RATE: 51 BPM | DIASTOLIC BLOOD PRESSURE: 59 MMHG | BODY MASS INDEX: 28.32 KG/M2 | WEIGHT: 150 LBS

## 2025-09-09 DIAGNOSIS — Z17.0 MALIGNANT NEOPLASM OF UPPER-INNER QUADRANT OF RIGHT FEMALE BREAST: ICD-10-CM

## 2025-09-09 DIAGNOSIS — D68.52 PROTHROMBIN GENE MUTATION: ICD-10-CM

## 2025-09-09 DIAGNOSIS — M81.0 AGE-RELATED OSTEOPOROSIS W/OUT CURRENT PATHOLOGICAL FRACTURE: ICD-10-CM

## 2025-09-09 DIAGNOSIS — C50.211 MALIGNANT NEOPLASM OF UPPER-INNER QUADRANT OF RIGHT FEMALE BREAST: ICD-10-CM

## 2025-09-09 PROCEDURE — G2211 COMPLEX E/M VISIT ADD ON: CPT

## 2025-09-09 PROCEDURE — 36415 COLL VENOUS BLD VENIPUNCTURE: CPT

## 2025-09-09 PROCEDURE — 99214 OFFICE O/P EST MOD 30 MIN: CPT
